# Patient Record
Sex: MALE | Race: WHITE | NOT HISPANIC OR LATINO
[De-identification: names, ages, dates, MRNs, and addresses within clinical notes are randomized per-mention and may not be internally consistent; named-entity substitution may affect disease eponyms.]

---

## 2023-06-23 PROBLEM — Z00.00 ENCOUNTER FOR PREVENTIVE HEALTH EXAMINATION: Status: ACTIVE | Noted: 2023-06-23

## 2023-07-06 ENCOUNTER — FORM ENCOUNTER (OUTPATIENT)
Age: 63
End: 2023-07-06

## 2023-07-06 ENCOUNTER — APPOINTMENT (OUTPATIENT)
Dept: ORTHOPEDIC SURGERY | Facility: CLINIC | Age: 63
End: 2023-07-06
Payer: COMMERCIAL

## 2023-07-06 VITALS — WEIGHT: 250 LBS | HEIGHT: 67 IN | BODY MASS INDEX: 39.24 KG/M2

## 2023-07-06 DIAGNOSIS — I10 ESSENTIAL (PRIMARY) HYPERTENSION: ICD-10-CM

## 2023-07-06 PROCEDURE — 99205 OFFICE O/P NEW HI 60 MIN: CPT

## 2023-07-06 PROCEDURE — 73503 X-RAY EXAM HIP UNI 4/> VIEWS: CPT | Mod: LT

## 2023-07-06 PROCEDURE — 73564 X-RAY EXAM KNEE 4 OR MORE: CPT | Mod: 50

## 2023-07-06 RX ORDER — HYALURONATE SODIUM 30 MG/2 ML
30 SYRINGE (ML) INTRAARTICULAR
Qty: 8 | Refills: 0 | Status: ACTIVE | COMMUNITY
Start: 2023-07-06

## 2023-07-06 NOTE — ASSESSMENT
[FreeTextEntry1] : 62 year M with left hip severe OA - tonnis grade 3\par \par I,Otoniel Lopez M.D. discussed the patient’s diagnosis and treatment options, non-surgical and surgical, with the patient and/or legal guardian including the potential benefits and complications of each. Potential complications of non-surgical treatments discussed include residual pain and/or disability, non-healing, progression of symptoms and/or disease. Potential complications of surgery discussed include infection, nerve injury, vascular injury, cartilage injury, ligament injury, tendon injury, muscle injury, skin injury, stiffness, instability, weakness, persistent pain, technical or hardware failure, need for additional surgery, re-injury, medical complication, anesthetic related complication, and/or death. All questions were answered. The patient and/or legal guardian has elected to proceed with surgery. Informed consent obtained for SADIA L YUNIER\par \par                   \par Preoperative evaluation and testing as needed is advised within 30 days prior to the procedure.\par  Also with b/l knee OA will do orthovisc b/l knees. Follow up once auth approved

## 2023-07-06 NOTE — HISTORY OF PRESENT ILLNESS
[9] : 9 [3] : 3 [Dull/Aching] : dull/aching [Localized] : localized [Sharp] : sharp [Leisure] : leisure [Sleep] : sleep [Standing] : standing [Walking] : walking [Stairs] : stairs [de-identified] : 07/06/2023 Mr. DIONNE BARKER is a 62 year male that comes in today with a chief complaint of left hip pain and bilateral knee pain. Has not had any injections to knees or hips. Reports groin pain. Does not take anything for pain. \par Works as \par \par PMH: HTN, HLD, hypothyroidism [] : Post Surgical Visit: no [FreeTextEntry1] : Left Hip [FreeTextEntry5] : Patient DIONNE BARKER is 62 years old and is being evaluated for the LEFT HIP. Patient had an incident with the right knee and because of the compensation the left hip started having pain. [de-identified] : 06/06/23 [de-identified] : PCP [de-identified] : none.

## 2023-07-18 ENCOUNTER — NON-APPOINTMENT (OUTPATIENT)
Age: 63
End: 2023-07-18

## 2023-07-25 ENCOUNTER — OUTPATIENT (OUTPATIENT)
Dept: OUTPATIENT SERVICES | Facility: HOSPITAL | Age: 63
LOS: 1 days | Discharge: HOME | End: 2023-07-25
Payer: COMMERCIAL

## 2023-07-25 VITALS
SYSTOLIC BLOOD PRESSURE: 128 MMHG | HEIGHT: 67 IN | HEART RATE: 90 BPM | TEMPERATURE: 98 F | RESPIRATION RATE: 17 BRPM | WEIGHT: 275.8 LBS | DIASTOLIC BLOOD PRESSURE: 76 MMHG | OXYGEN SATURATION: 97 %

## 2023-07-25 DIAGNOSIS — Z01.818 ENCOUNTER FOR OTHER PREPROCEDURAL EXAMINATION: ICD-10-CM

## 2023-07-25 DIAGNOSIS — M16.12 UNILATERAL PRIMARY OSTEOARTHRITIS, LEFT HIP: ICD-10-CM

## 2023-07-25 DIAGNOSIS — Z98.890 OTHER SPECIFIED POSTPROCEDURAL STATES: Chronic | ICD-10-CM

## 2023-07-25 DIAGNOSIS — E78.5 HYPERLIPIDEMIA, UNSPECIFIED: ICD-10-CM

## 2023-07-25 DIAGNOSIS — E03.9 HYPOTHYROIDISM, UNSPECIFIED: ICD-10-CM

## 2023-07-25 DIAGNOSIS — G47.30 SLEEP APNEA, UNSPECIFIED: ICD-10-CM

## 2023-07-25 DIAGNOSIS — Z01.812 ENCOUNTER FOR PREPROCEDURAL LABORATORY EXAMINATION: ICD-10-CM

## 2023-07-25 DIAGNOSIS — I10 ESSENTIAL (PRIMARY) HYPERTENSION: ICD-10-CM

## 2023-07-25 LAB
A1C WITH ESTIMATED AVERAGE GLUCOSE RESULT: 7.6 % — HIGH (ref 4–5.6)
ALBUMIN SERPL ELPH-MCNC: 3.7 G/DL — SIGNIFICANT CHANGE UP (ref 3.3–5)
ALP SERPL-CCNC: 73 U/L — SIGNIFICANT CHANGE UP (ref 40–120)
ALT FLD-CCNC: 54 U/L — SIGNIFICANT CHANGE UP (ref 12–78)
ANION GAP SERPL CALC-SCNC: 6 MMOL/L — SIGNIFICANT CHANGE UP (ref 5–17)
APTT BLD: 34.2 SEC — SIGNIFICANT CHANGE UP (ref 24.5–35.6)
AST SERPL-CCNC: 19 U/L — SIGNIFICANT CHANGE UP (ref 15–37)
BASOPHILS # BLD AUTO: 0.05 K/UL — SIGNIFICANT CHANGE UP (ref 0–0.2)
BASOPHILS NFR BLD AUTO: 0.4 % — SIGNIFICANT CHANGE UP (ref 0–2)
BILIRUB SERPL-MCNC: 0.5 MG/DL — SIGNIFICANT CHANGE UP (ref 0.2–1.2)
BUN SERPL-MCNC: 17 MG/DL — SIGNIFICANT CHANGE UP (ref 7–23)
CALCIUM SERPL-MCNC: 9.2 MG/DL — SIGNIFICANT CHANGE UP (ref 8.5–10.1)
CHLORIDE SERPL-SCNC: 108 MMOL/L — SIGNIFICANT CHANGE UP (ref 96–108)
CO2 SERPL-SCNC: 25 MMOL/L — SIGNIFICANT CHANGE UP (ref 22–31)
CREAT SERPL-MCNC: 0.94 MG/DL — SIGNIFICANT CHANGE UP (ref 0.5–1.3)
EGFR: 91 ML/MIN/1.73M2 — SIGNIFICANT CHANGE UP
EOSINOPHIL # BLD AUTO: 0.39 K/UL — SIGNIFICANT CHANGE UP (ref 0–0.5)
EOSINOPHIL NFR BLD AUTO: 3.2 % — SIGNIFICANT CHANGE UP (ref 0–6)
ESTIMATED AVERAGE GLUCOSE: 171 MG/DL — HIGH (ref 68–114)
GLUCOSE SERPL-MCNC: 154 MG/DL — HIGH (ref 70–99)
HCT VFR BLD CALC: 51.5 % — HIGH (ref 39–50)
HGB BLD-MCNC: 16.7 G/DL — SIGNIFICANT CHANGE UP (ref 13–17)
IMM GRANULOCYTES NFR BLD AUTO: 0.4 % — SIGNIFICANT CHANGE UP (ref 0–0.9)
INR BLD: 0.92 RATIO — SIGNIFICANT CHANGE UP (ref 0.85–1.18)
LYMPHOCYTES # BLD AUTO: 19.2 % — SIGNIFICANT CHANGE UP (ref 13–44)
LYMPHOCYTES # BLD AUTO: 2.31 K/UL — SIGNIFICANT CHANGE UP (ref 1–3.3)
MCHC RBC-ENTMCNC: 28.3 PG — SIGNIFICANT CHANGE UP (ref 27–34)
MCHC RBC-ENTMCNC: 32.4 G/DL — SIGNIFICANT CHANGE UP (ref 32–36)
MCV RBC AUTO: 87.3 FL — SIGNIFICANT CHANGE UP (ref 80–100)
MONOCYTES # BLD AUTO: 0.9 K/UL — SIGNIFICANT CHANGE UP (ref 0–0.9)
MONOCYTES NFR BLD AUTO: 7.5 % — SIGNIFICANT CHANGE UP (ref 2–14)
MRSA PCR RESULT.: SIGNIFICANT CHANGE UP
NEUTROPHILS # BLD AUTO: 8.36 K/UL — HIGH (ref 1.8–7.4)
NEUTROPHILS NFR BLD AUTO: 69.3 % — SIGNIFICANT CHANGE UP (ref 43–77)
NRBC # BLD: 0 /100 WBCS — SIGNIFICANT CHANGE UP (ref 0–0)
PLATELET # BLD AUTO: 274 K/UL — SIGNIFICANT CHANGE UP (ref 150–400)
POTASSIUM SERPL-MCNC: 4 MMOL/L — SIGNIFICANT CHANGE UP (ref 3.5–5.3)
POTASSIUM SERPL-SCNC: 4 MMOL/L — SIGNIFICANT CHANGE UP (ref 3.5–5.3)
PROT SERPL-MCNC: 7.8 GM/DL — SIGNIFICANT CHANGE UP (ref 6–8.3)
PROTHROM AB SERPL-ACNC: 11.1 SEC — SIGNIFICANT CHANGE UP (ref 9.5–13)
RBC # BLD: 5.9 M/UL — HIGH (ref 4.2–5.8)
RBC # FLD: 14.2 % — SIGNIFICANT CHANGE UP (ref 10.3–14.5)
S AUREUS DNA NOSE QL NAA+PROBE: SIGNIFICANT CHANGE UP
SODIUM SERPL-SCNC: 139 MMOL/L — SIGNIFICANT CHANGE UP (ref 135–145)
VIT D25+D1,25 OH+D1,25 PNL SERPL-MCNC: 61.8 PG/ML — SIGNIFICANT CHANGE UP (ref 19.9–79.3)
WBC # BLD: 12.06 K/UL — HIGH (ref 3.8–10.5)
WBC # FLD AUTO: 12.06 K/UL — HIGH (ref 3.8–10.5)

## 2023-07-25 PROCEDURE — 93010 ELECTROCARDIOGRAM REPORT: CPT

## 2023-07-25 NOTE — H&P PST ADULT - ATTENDING COMMENTS
I discussed this dx with the family at length. In order to restore the patient's ability to ambulate with minimal pain, a Left robotic assisted total hip replacement was recommended. The risks of the procedure were discussed at length which included but not limited to infection, bleeding, and damage to neurovascular structures. Informed consent was obtained.

## 2023-07-25 NOTE — H&P PST ADULT - ENMT COMMENTS
poor dentition , missing uoper tooth and has  2 loose lower teeth- sent to see dentist poor dentition , missing upper tooth and has  2 loose lower teeth- sent to see dentist

## 2023-07-25 NOTE — OCCUPATIONAL THERAPY INITIAL EVALUATION ADULT - ORIENTATION, REHAB EVAL
[facility] OCCUPATIONAL THERAPY DISCHARGE SUMMARY- REHAB     Date: 2021  Patient Name: Gustabo Cannon        MRN: [id number]  Account: [de-identified]   : 1937  (80 y.o.)  Room: [place] [address]    Diagnosis:  impaired mobility and ADL's due to CHF exacerbation    Past Medical History:   Diagnosis Date    Cardiac defibrillator in place     CKD (chronic kidney disease) stage 2, GFR 60-89 ml/min     COPD (chronic obstructive pulmonary disease) ([facility])     Dr Floyd Hwang Coronary artery disease involving native heart     managed by Dr Martínez Meredith.  Diastolic dysfunction     managed by Dr Martínez Meredith.  Diverticulosis of colon (without mention of hemorrhage)     Generalized anxiety disorder     Generalized osteoarthritis 10/02/2020    Glaucoma, left eye     managed by Dr Opal Rich History of CHF (congestive heart failure) 2014    managed by Dr Martínez Meredith.  History of lung cancer 2017    squamous cell, left base, had wedge resection Dr Court Izquierdo History of prostate cancer     prostatectomy --remission    History of rib fracture     left 9th and 10th ribs.  Hx of CABG     Hyperlipidemia     Hypertension 2004    Hyperuricemia     Macular degeneration, left eye     managed by Dr Romayne Purser    Mild cognitive impairment     Neurogenic orthostatic hypotension ([address]     Nocturnal hypoxemia     PAF (paroxysmal atrial fibrillation) ([facility])     [facility], Dr Aldo Garcia [facility])     Dr Arleen Nelson Primary hypothyroidism 2015    Primary lung squamous cell carcinoma ([address]     Prostate cancer ([address] 1999    prostatectomy --remission    Status post colostomy ([address] 2014    Dr Etienne Michaels, perforated diverticulitis    Tubular adenoma of colon     Dr Bishop Osborne     Past Surgical History:   Procedure Laterality Date    CARDIAC DEFIBRILLATOR PLACEMENT      Alyson Givens Fortify Defibrillator NOT MRI Compatable 0663-45T    COLONOSCOPY  6/4/15    DR. SHELLEY     COLOSTOMY 8/13/14    Dr Marcial Marlow  2004    CABG X 4    HEMIARTHROPLASTY HIP Right 4/28/2019    HIP HEMIARTHROPLASTY performed by Soy Ling MD at [address], PARTIAL Left 3/13/2015    wedge resection of left lung lower lobe    OTHER SURGICAL HISTORY  8/13/14    Exploratory laparotomy with sigmoid colectomy of end sigmoid colosotomy       Precautions:   Restrictions/Precautions: Fall Risk  Implants present? : Pacemaker  Other position/activity restrictions: colostomy     Social/Functional History:  Social/Functional History  Lives With: Significant other  Type of Home: House  Home Layout: One level  Home Access: Stairs to enter without rails  Entrance Stairs - Number of Steps: 1  Bathroom Shower/Tub: Walk-in shower  Bathroom Equipment: Grab bars in shower, Shower chair  Home Equipment: Rolling walker, Cane, [address] Help From: Home health  ADL Assistance: Independent  Homemaking Responsibilities: No  Ambulation Assistance: Needs assistance (uses ww and states occasionally needed assistance)  Transfer Assistance: Independent  Active : No  Occupation: Retired  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  Additional Comments: Pt was recently in SNF and discharged home approximately 1 week prior to admission    Current Functional Status:  ADL  Feeding: Setup  Grooming: Modified independent  (While standing with unilateral UE support on sink)  UE Bathing: Unable to assess(comment) (Pt declined bathing)  LE Bathing: Unable to assess(comment) (Pt declined bathing)  UE Dressing: Modified independent  (While seated EOB)  LE Dressing: Modified independent  (While seated EOB- pt doff/don pants, socks, shoes)  Toileting: Setup (Pt emptied colostomy bag while seated EOB, did not have to use toilet to urinate)  Toilet Transfers  Toilet - Technique: Stand pivot  Equipment Used: Grab bars  Toilet Transfer: Modified independent    Tub Transfers Tub Transfers: Not tested  Shower Transfers  Shower - Transfer From: Wheelchair  Shower - Transfer Type: To and From  Shower - Transfer To: Shower seat with back  Shower - Technique: Stand pivot  Shower Transfers: Modified independence  Shower Transfers Comments: Pt simulated shower transfer. Orientation Status:  Orientation  Overall Orientation Status: Impaired  Orientation Level: Oriented to place, Oriented to person    Cognition Status:  Cognition  Overall Cognitive Status: WNL  Arousal/Alertness: Delayed responses to stimuli  Following Commands: Follows one step commands with increased time  Attention Span: Appears intact  Memory: Appears intact  Safety Judgement: Decreased awareness of need for assistance (1 VC to lock brakes on wheelchair before standing.)  Problem Solving: Assistance required to generate solutions, Assistance required to implement solutions  Insights: Decreased awareness of deficits  Initiation: Requires cues for some  Sequencing: Requires cues for some  Cognition Comment: Comp: Mod I, Exp: I, Social: I, Mem: SUP, Prob: SUP    Perception Status:  Perception  Overall Perceptual Status: WFL    Sensation Status:  Sensation  Overall Sensation Status: WFL    Vision and Hearing Status:  Vision  Vision: Impaired  Vision Exceptions: Wears glasses for reading  Hearing  Hearing: Exceptions to [facility]  Hearing Exceptions: Hard of hearing/hearing concerns     UE Function Status:    ROM:   LUE AROM (degrees)  LUE AROM : WFL  Left Hand AROM (degrees)  Left Hand AROM: WFL  RUE AROM (degrees)  RUE AROM : WFL  Right Hand AROM (degrees)  Right Hand AROM: WFL    Strength:  LUE Strength  Gross LUE Strength: WFL  L Hand General: 4/5  RUE Strength  Gross RUE Strength: WFL  R Hand General: 4/5    Coordination, Tone, Quality of Movement:    Tone RUE  RUE Tone: Normotonic  Tone LUE  LUE Tone: Normotonic  Coordination  Movements Are Fluid And Coordinated: No  Coordination and Movement description: Tremors, Decreased speed, Right UE, Left UE    D/C Recommendations:    Equipment Recommendations:   None at this time    OT Follow Up:  OT D/C RECOMMENDATIONS  REQUIRES OT FOLLOW UP: Yes  Type: Home OT    Home Exercise Program Provided: [] Yes [x] No  Patient was discharged as planned discharge to a medical floor for cardiac intervention      Electronically signed by:    YULISSA Keene/L,  OTR/L  5/17/2021, 4:22 PM verbalized name and .

## 2023-07-25 NOTE — OCCUPATIONAL THERAPY INITIAL EVALUATION ADULT - PERTINENT HX OF CURRENT PROBLEM, REHAB EVAL
L hip OA which impacts pts ability to perform functional tasks/transfers and mobility. Pt is scheduled for L THR on 8/14/23.

## 2023-07-25 NOTE — H&P PST ADULT - ASSESSMENT
left hip osteoarthritis  CAPRINI SCORE    AGE RELATED RISK FACTORS                                                       MOBILITY RELATED FACTORS  [ ] Age 41-60 years                                            (1 Point)                  [ ] Bed rest                                                        (1 Point)  [ x] Age: 61-74 years                                           (2 Points)                [ ] Plaster cast                                                   (2 Points)  [ ] Age= 75 years                                              (3 Points)                 [ ] Bed bound for more than 72 hours                   (2 Points)    DISEASE RELATED RISK FACTORS                                               GENDER SPECIFIC FACTORS  [ ] Edema in the lower extremities                       (1 Point)                  [ ] Pregnancy                                                     (1 Point)  [ ] Varicose veins                                               (1 Point)                  [ ] Post-partum < 6 weeks                                   (1 Point)             [x ] BMI > 25 Kg/m2                                            (1 Point)                  [ ] Hormonal therapy  or oral contraception            (1 Point)                 [ ] Sepsis (in the previous month)                        (1 Point)                  [ ] History of pregnancy complications  [ ] Pneumonia or serious lung disease                                               [ ] Unexplained or recurrent                       (1 Point)           (in the previous month)                               (1 Point)  [ ] Abnormal pulmonary function test                     (1 Point)                 SURGERY RELATED RISK FACTORS  [ ] Acute myocardial infarction                              (1 Point)                 [ ]  Section                                            (1 Point)  [ ] Congestive heart failure (in the previous month)  (1 Point)                 [ ] Minor surgery                                                 (1 Point)   [ ] Inflammatory bowel disease                             (1 Point)                 [ ] Arthroscopic surgery                                        (2 Points)  [ ] Central venous access                                    (2 Points)                [ ] General surgery lasting more than 45 minutes   (2 Points)       [ ] Stroke (in the previous month)                          (5 Points)               [x ] Elective arthroplasty                                        (5 Points)                                                                                                                                               HEMATOLOGY RELATED FACTORS                                                 TRAUMA RELATED RISK FACTORS  [ ] Prior episodes of VTE                                     (3 Points)                 [ ] Fracture of the hip, pelvis, or leg                       (5 Points)  [ ] Positive family history for VTE                         (3 Points)                 [ ] Acute spinal cord injury (in the previous month)  (5 Points)  [ ] Prothrombin 04169 A                                      (3 Points)                 [ ] Paralysis  (less than 1 month)                          (5 Points)  [ ] Factor V Leiden                                             (3 Points)                 [ ] Multiple Trauma within 1 month                         (5 Points)  [ ] Lupus anticoagulants                                     (3 Points)                                                           [ ] Anticardiolipin antibodies                                (3 Points)                                                       [ ] High homocysteine in the blood                      (3 Points)                                             [ ] Other congenital or acquired thrombophilia       (3 Points)                                                [ ] Heparin induced thrombocytopenia                  (3 Points)                                          Total Score [     8     ]   Caprini Score 0-2: Low risk, No VTE Prophylaxis required for most patient's, encourage ambulation  Caprini Score 3-6: At Risk, Pharmacologic VTE prophylaxis is indicated for most patients ( in the absence of a contraindication)  Caprini Score Greater than or = 7: High Risk , pharmacologic VTE is indicated for most patients ( in the absence of a contraindication)    Caprini score indicates that the patient is high risk for VTE event ( score 6 or greater). Surgical patient's in this group will benefit from both pharmacologic prophylaxis and intermittent compression devices . Surgical team will determine the balance between VTE  risk and bleeding risk and other clinical considerations

## 2023-07-25 NOTE — OCCUPATIONAL THERAPY INITIAL EVALUATION ADULT - ADDITIONAL COMMENTS
Pt lives with spouse (Who can assist post op) in a private house with 9 steps to enter with bilateral handrails (Far apart). Once inside, the pt main bedroom and bathroom is on that floor when entering. The pts bathroom has a tub/shower combination, fixed shower head, standard toilet seat and 1x grab bar. The pt reports that a 3/1 commode can fit over the toilet at home. The pt ambulates with no device and does not own any device for ambulation. The pt daily pain is a 7/10 at rest and a 10/10 with movement. The pt manages the pain with rest and does not take any pain medication. The pt has no recent outpatient PT, no recent falls and has buckling of the leg. The pt wears glasses all the time, R handed, drives and has no hearing impairments.

## 2023-07-25 NOTE — H&P PST ADULT - HISTORY OF PRESENT ILLNESS
62 yo male , pmh- hypothyroid , htn,hld, obese sleep apnea syndrome c/o left hip pain 2/2 osteoarthritis - scheduled for left  hip arthroplasty  goal: to walk without pain   denies recent travels in the past 30 days. No fever, SOB, cough, flu like symptoms or body rash- covid screen

## 2023-07-25 NOTE — H&P PST ADULT - NSANTHOSAYNRD_GEN_A_CORE
denies/No. ALMA DELIA screening performed.  STOP BANG Legend: 0-2 = LOW Risk; 3-4 = INTERMEDIATE Risk; 5-8 = HIGH Risk

## 2023-08-04 ENCOUNTER — NON-APPOINTMENT (OUTPATIENT)
Age: 63
End: 2023-08-04

## 2023-08-09 PROBLEM — E03.9 HYPOTHYROIDISM, UNSPECIFIED: Chronic | Status: ACTIVE | Noted: 2023-07-25

## 2023-08-09 PROBLEM — E66.9 OBESITY, UNSPECIFIED: Chronic | Status: ACTIVE | Noted: 2023-07-25

## 2023-08-09 PROBLEM — I10 ESSENTIAL (PRIMARY) HYPERTENSION: Chronic | Status: ACTIVE | Noted: 2023-07-25

## 2023-08-09 PROBLEM — E78.5 HYPERLIPIDEMIA, UNSPECIFIED: Chronic | Status: ACTIVE | Noted: 2023-07-25

## 2023-08-10 ENCOUNTER — APPOINTMENT (OUTPATIENT)
Dept: CT IMAGING | Facility: CLINIC | Age: 63
End: 2023-08-10
Payer: COMMERCIAL

## 2023-08-10 PROCEDURE — 73700 CT LOWER EXTREMITY W/O DYE: CPT | Mod: LT

## 2023-08-10 PROCEDURE — 76376 3D RENDER W/INTRP POSTPROCES: CPT | Mod: LT

## 2023-08-14 ENCOUNTER — OUTPATIENT (OUTPATIENT)
Dept: OUTPATIENT SERVICES | Facility: HOSPITAL | Age: 63
LOS: 1 days | Discharge: HOME HEALTH | End: 2023-08-14

## 2023-08-14 ENCOUNTER — APPOINTMENT (OUTPATIENT)
Dept: ORTHOPEDIC SURGERY | Facility: HOSPITAL | Age: 63
End: 2023-08-14
Payer: COMMERCIAL

## 2023-08-14 ENCOUNTER — TRANSCRIPTION ENCOUNTER (OUTPATIENT)
Age: 63
End: 2023-08-14

## 2023-08-14 DIAGNOSIS — Z98.890 OTHER SPECIFIED POSTPROCEDURAL STATES: Chronic | ICD-10-CM

## 2023-08-14 PROCEDURE — 20985 CPTR-ASST DIR MS PX: CPT

## 2023-08-14 PROCEDURE — 27130 TOTAL HIP ARTHROPLASTY: CPT | Mod: 22,LT

## 2023-08-14 PROCEDURE — 27130 TOTAL HIP ARTHROPLASTY: CPT | Mod: AS,LT

## 2023-08-14 RX ORDER — SIMVASTATIN 20 MG/1
1 TABLET, FILM COATED ORAL
Refills: 0 | DISCHARGE

## 2023-08-14 RX ORDER — LEVOTHYROXINE SODIUM 125 MCG
1 TABLET ORAL
Refills: 0 | DISCHARGE

## 2023-08-15 ENCOUNTER — TRANSCRIPTION ENCOUNTER (OUTPATIENT)
Age: 63
End: 2023-08-15

## 2023-08-15 RX ORDER — AMLODIPINE AND ATORVASTATIN 5; 20 MG/1; MG/1
1 TABLET, FILM COATED ORAL
Refills: 0 | DISCHARGE

## 2023-08-17 ENCOUNTER — APPOINTMENT (OUTPATIENT)
Dept: ORTHOPEDIC SURGERY | Facility: CLINIC | Age: 63
End: 2023-08-17

## 2023-08-18 DIAGNOSIS — E03.9 HYPOTHYROIDISM, UNSPECIFIED: ICD-10-CM

## 2023-08-18 DIAGNOSIS — I10 ESSENTIAL (PRIMARY) HYPERTENSION: ICD-10-CM

## 2023-08-18 DIAGNOSIS — E78.5 HYPERLIPIDEMIA, UNSPECIFIED: ICD-10-CM

## 2023-08-18 DIAGNOSIS — M16.12 UNILATERAL PRIMARY OSTEOARTHRITIS, LEFT HIP: ICD-10-CM

## 2023-08-18 DIAGNOSIS — Z79.84 LONG TERM (CURRENT) USE OF ORAL HYPOGLYCEMIC DRUGS: ICD-10-CM

## 2023-08-18 DIAGNOSIS — E11.9 TYPE 2 DIABETES MELLITUS WITHOUT COMPLICATIONS: ICD-10-CM

## 2023-08-18 DIAGNOSIS — G47.33 OBSTRUCTIVE SLEEP APNEA (ADULT) (PEDIATRIC): ICD-10-CM

## 2023-08-29 ENCOUNTER — APPOINTMENT (OUTPATIENT)
Dept: ORTHOPEDIC SURGERY | Facility: CLINIC | Age: 63
End: 2023-08-29
Payer: COMMERCIAL

## 2023-08-29 VITALS — HEIGHT: 67 IN | BODY MASS INDEX: 39.24 KG/M2 | WEIGHT: 250 LBS

## 2023-08-29 DIAGNOSIS — M16.12 UNILATERAL PRIMARY OSTEOARTHRITIS, LEFT HIP: ICD-10-CM

## 2023-08-29 PROCEDURE — 73503 X-RAY EXAM HIP UNI 4/> VIEWS: CPT | Mod: LT

## 2023-08-29 PROCEDURE — 99024 POSTOP FOLLOW-UP VISIT: CPT

## 2023-08-29 NOTE — IMAGING
[de-identified] : LEFT HIP\par  Range of Motion\par  Hip: Flexion/extension/ER/IR     / EX 20/ ER 10/ IR 5 / AB 60 /AD 20\par  Impingement with flexion adduction and internal rotation: POS\par  Contracture: none\par  Snapping hip: negative\par  Greater trochanter: no tenderness\par  \par  Neurovascular\par  Distal extremities: warm to touch\par  Sensation to light touch: intact\par  Muscle strength: 5/5\par   \par  \par  Knee Exam\par  Alignment: Neutral \par  Effusion: None\par  Atrophy: None                                                \par  Stable to Varus/valgus stress\par  Posterior Drawer Test: negative\par  Anterior Drawer Test: Negative\par  Knee Extension/Flexion: 0 / 120\par  \par  Medial/lateral compartments\par  Medial joint line: No Tenderness\par  Lateral joint line: No Tenderness\par  Flo test: negative\par  \par  Patellofemoral joint\par  Medial patellar facet: POS tenderness\par  Patellar grind:POS\par  \par  Tendons:\par  Pes Anserine: No tenderness\par  Gerdy's Tubercle/ IT Band: No tenderness\par  Quadriceps Tendon: No Tenderness\par  patellar tendon: no Tenderness\par  Tibial tubercle: not tenderness\par  Calf: no Tenderness\par  \par  Neurovascular exam\par  Muscle strength: 5/5\par  Sensation to light touch: intact\par  Distal pulses: 2+\par  \par  IMAGING:\par  07/06/2023 Xrays of the Left hip 3v were taken demonstrating tonnis grade 3\par  XR of bilateral knees showing mild medial joint space narrowing with severe PF OA

## 2023-08-29 NOTE — ASSESSMENT
[FreeTextEntry1] : 62 year M with left hip severe OA - tonnis grade 3  I,Otoniel Lopez M.D. discussed the patient's diagnosis and treatment options, non-surgical and surgical, with the patient and/or legal guardian including the potential benefits and complications of each. Potential complications of non-surgical treatments discussed include residual pain and/or disability, non-healing, progression of symptoms and/or disease. Potential complications of surgery discussed include infection, nerve injury, vascular injury, cartilage injury, ligament injury, tendon injury, muscle injury, skin injury, stiffness, instability, weakness, persistent pain, technical or hardware failure, need for additional surgery, re-injury, medical complication, anesthetic related complication, and/or death. All questions were answered. The patient and/or legal guardian has elected to proceed with surgery. Informed consent obtained for SADIA L YUNIER                     Preoperative evaluation and testing as needed is advised within 30 days prior to the procedure.  Also with b/l knee OA will do orthovisc b/l knees. Follow up once auth approved  8/29/23: 1st post op YUNIER, doing well. Transition to OP PT, continue ASA X 2 weeks Follow up at 6 week ap, will clear to drive after repeat visit

## 2023-08-29 NOTE — HISTORY OF PRESENT ILLNESS
[Dull/Aching] : dull/aching [Sleep] : sleep [Standing] : standing [Walking] : walking [Stairs] : stairs [3] : 3 [9] : 9 [de-identified] : 07/06/2023 Mr. DIONNE BARKER is a 62 year male that comes in today with a chief complaint of left hip pain and bilateral knee pain. Has not had any injections to knees or hips. Reports groin pain. Does not take anything for pain.  Works as   PMH: HTN, HLD, hypothyroidism  08/29/2023 : 1st post op s/p YUNIER. Doing well, ambulating with a cane. Not taking anything for pain.  [] : Post Surgical Visit: no [FreeTextEntry1] : Left Hip [de-identified] : 06/06/23 [de-identified] : PCP [de-identified] : none.

## 2023-09-06 ENCOUNTER — APPOINTMENT (OUTPATIENT)
Dept: ORTHOPEDIC SURGERY | Facility: CLINIC | Age: 63
End: 2023-09-06
Payer: COMMERCIAL

## 2023-09-06 PROCEDURE — 73503 X-RAY EXAM HIP UNI 4/> VIEWS: CPT | Mod: LT

## 2023-09-06 PROCEDURE — 99024 POSTOP FOLLOW-UP VISIT: CPT

## 2023-09-06 RX ORDER — OXYCODONE 5 MG/1
5 TABLET ORAL EVERY 8 HOURS
Qty: 21 | Refills: 0 | Status: ACTIVE | COMMUNITY
Start: 2023-09-06 | End: 1900-01-01

## 2023-09-08 NOTE — HISTORY OF PRESENT ILLNESS
[3] : 3 [9] : 9 [Dull/Aching] : dull/aching [Sleep] : sleep [Standing] : standing [Walking] : walking [Stairs] : stairs [de-identified] : 07/06/2023 Mr. DIONNE BARKER is a 62 year male that comes in today with a chief complaint of left hip pain and bilateral knee pain. Has not had any injections to knees or hips. Reports groin pain. Does not take anything for pain.  Works as   PMH: HTN, HLD, hypothyroidism  08/29/2023 : 1st post op s/p YUNIER. Doing well, ambulating with a cane. Not taking anything for pain.   9/6/23: 3 weeks s/p YUNIER. Doing well, ambulating with a cane. Reports incidence of severe pain, felt the hip. Went to sleep and woke up with relief. States pain has been improving but still having pain.  [] : Post Surgical Visit: no [FreeTextEntry1] : Left Hip [de-identified] : 06/06/23 [de-identified] : PCP [de-identified] : none.

## 2023-09-08 NOTE — PHYSICAL EXAM
[de-identified] : POSTOP Left HIP EXAM Edema: mild well healing surgical incision without surrounding erythema/drainage   ROM 0-90 degrees of flexion No pain with IR/ER ROM   Neurovascular exam Motor function 5/5 distal lower extremity Sensation to light touch: intact Distal pulses: 2+  XR of the L hip today demonstrate YUNIER in place w/o signs of interval changes from postoperative XR

## 2023-09-08 NOTE — ASSESSMENT
[FreeTextEntry1] : 62 year M with left hip severe OA - tonnis grade 3 s/p L YUNIER  Repeat XRs compared to previous interval XRs and reviewed with Dr. Lopez with no significant changes Patient to continue dislocation precautions and physical therapy  Follow up in 2-3 weeks

## 2023-09-08 NOTE — IMAGING
[de-identified] : LEFT HIP\par  Range of Motion\par  Hip: Flexion/extension/ER/IR     / EX 20/ ER 10/ IR 5 / AB 60 /AD 20\par  Impingement with flexion adduction and internal rotation: POS\par  Contracture: none\par  Snapping hip: negative\par  Greater trochanter: no tenderness\par  \par  Neurovascular\par  Distal extremities: warm to touch\par  Sensation to light touch: intact\par  Muscle strength: 5/5\par   \par  \par  Knee Exam\par  Alignment: Neutral \par  Effusion: None\par  Atrophy: None                                                \par  Stable to Varus/valgus stress\par  Posterior Drawer Test: negative\par  Anterior Drawer Test: Negative\par  Knee Extension/Flexion: 0 / 120\par  \par  Medial/lateral compartments\par  Medial joint line: No Tenderness\par  Lateral joint line: No Tenderness\par  Flo test: negative\par  \par  Patellofemoral joint\par  Medial patellar facet: POS tenderness\par  Patellar grind:POS\par  \par  Tendons:\par  Pes Anserine: No tenderness\par  Gerdy's Tubercle/ IT Band: No tenderness\par  Quadriceps Tendon: No Tenderness\par  patellar tendon: no Tenderness\par  Tibial tubercle: not tenderness\par  Calf: no Tenderness\par  \par  Neurovascular exam\par  Muscle strength: 5/5\par  Sensation to light touch: intact\par  Distal pulses: 2+\par  \par  IMAGING:\par  07/06/2023 Xrays of the Left hip 3v were taken demonstrating tonnis grade 3\par  XR of bilateral knees showing mild medial joint space narrowing with severe PF OA

## 2023-09-26 ENCOUNTER — APPOINTMENT (OUTPATIENT)
Dept: ORTHOPEDIC SURGERY | Facility: CLINIC | Age: 63
End: 2023-09-26
Payer: COMMERCIAL

## 2023-09-26 VITALS — HEIGHT: 67 IN | WEIGHT: 250 LBS | BODY MASS INDEX: 39.24 KG/M2

## 2023-09-26 DIAGNOSIS — M17.0 BILATERAL PRIMARY OSTEOARTHRITIS OF KNEE: ICD-10-CM

## 2023-09-26 DIAGNOSIS — Z96.642 PRESENCE OF LEFT ARTIFICIAL HIP JOINT: ICD-10-CM

## 2023-09-26 PROCEDURE — 99213 OFFICE O/P EST LOW 20 MIN: CPT | Mod: 24

## 2023-09-26 RX ORDER — HYALURONATE SODIUM 20 MG/2 ML
20 SYRINGE (ML) INTRAARTICULAR
Qty: 1 | Refills: 0 | Status: ACTIVE | COMMUNITY
Start: 2023-09-26

## 2023-11-07 ENCOUNTER — APPOINTMENT (OUTPATIENT)
Dept: ORTHOPEDIC SURGERY | Facility: CLINIC | Age: 63
End: 2023-11-07

## 2023-12-10 ENCOUNTER — NON-APPOINTMENT (OUTPATIENT)
Age: 63
End: 2023-12-10

## 2024-02-29 ENCOUNTER — NON-APPOINTMENT (OUTPATIENT)
Age: 64
End: 2024-02-29

## 2024-08-23 ENCOUNTER — INPATIENT (INPATIENT)
Facility: HOSPITAL | Age: 64
LOS: 9 days | Discharge: HOME HEALTH | DRG: 392 | End: 2024-09-02
Attending: INTERNAL MEDICINE | Admitting: INTERNAL MEDICINE
Payer: COMMERCIAL

## 2024-08-23 VITALS
SYSTOLIC BLOOD PRESSURE: 106 MMHG | TEMPERATURE: 98 F | HEIGHT: 67 IN | OXYGEN SATURATION: 95 % | DIASTOLIC BLOOD PRESSURE: 80 MMHG | RESPIRATION RATE: 20 BRPM | WEIGHT: 250 LBS | HEART RATE: 98 BPM

## 2024-08-23 DIAGNOSIS — Z98.890 OTHER SPECIFIED POSTPROCEDURAL STATES: Chronic | ICD-10-CM

## 2024-08-23 DIAGNOSIS — R14.0 ABDOMINAL DISTENSION (GASEOUS): ICD-10-CM

## 2024-08-23 LAB
ALBUMIN SERPL ELPH-MCNC: 2.8 G/DL — LOW (ref 3.3–5)
ALP SERPL-CCNC: 146 U/L — HIGH (ref 40–120)
ALT FLD-CCNC: 10 U/L — SIGNIFICANT CHANGE UP (ref 10–45)
ANION GAP SERPL CALC-SCNC: 11 MMOL/L — SIGNIFICANT CHANGE UP (ref 5–17)
APTT BLD: 40.5 SEC — HIGH (ref 24.5–35.6)
AST SERPL-CCNC: 10 U/L — SIGNIFICANT CHANGE UP (ref 10–40)
BASOPHILS # BLD AUTO: 0.05 K/UL — SIGNIFICANT CHANGE UP (ref 0–0.2)
BASOPHILS NFR BLD AUTO: 0.6 % — SIGNIFICANT CHANGE UP (ref 0–2)
BILIRUB SERPL-MCNC: 0.5 MG/DL — SIGNIFICANT CHANGE UP (ref 0.2–1.2)
BUN SERPL-MCNC: 10 MG/DL — SIGNIFICANT CHANGE UP (ref 7–23)
CALCIUM SERPL-MCNC: 9 MG/DL — SIGNIFICANT CHANGE UP (ref 8.4–10.5)
CHLORIDE SERPL-SCNC: 101 MMOL/L — SIGNIFICANT CHANGE UP (ref 96–108)
CO2 SERPL-SCNC: 28 MMOL/L — SIGNIFICANT CHANGE UP (ref 22–31)
CREAT SERPL-MCNC: 0.71 MG/DL — SIGNIFICANT CHANGE UP (ref 0.5–1.3)
EGFR: 102 ML/MIN/1.73M2 — SIGNIFICANT CHANGE UP
EOSINOPHIL # BLD AUTO: 0.32 K/UL — SIGNIFICANT CHANGE UP (ref 0–0.5)
EOSINOPHIL NFR BLD AUTO: 3.6 % — SIGNIFICANT CHANGE UP (ref 0–6)
FLUAV AG NPH QL: SIGNIFICANT CHANGE UP
FLUBV AG NPH QL: SIGNIFICANT CHANGE UP
GLUCOSE SERPL-MCNC: 125 MG/DL — HIGH (ref 70–99)
HCT VFR BLD CALC: 35.2 % — LOW (ref 39–50)
HGB BLD-MCNC: 11 G/DL — LOW (ref 13–17)
HIV 1 & 2 AB SERPL IA.RAPID: SIGNIFICANT CHANGE UP
IMM GRANULOCYTES NFR BLD AUTO: 0.7 % — SIGNIFICANT CHANGE UP (ref 0–0.9)
INR BLD: 1.24 RATIO — HIGH (ref 0.85–1.18)
LYMPHOCYTES # BLD AUTO: 2.58 K/UL — SIGNIFICANT CHANGE UP (ref 1–3.3)
LYMPHOCYTES # BLD AUTO: 29 % — SIGNIFICANT CHANGE UP (ref 13–44)
MCHC RBC-ENTMCNC: 26.1 PG — LOW (ref 27–34)
MCHC RBC-ENTMCNC: 31.3 GM/DL — LOW (ref 32–36)
MCV RBC AUTO: 83.4 FL — SIGNIFICANT CHANGE UP (ref 80–100)
MONOCYTES # BLD AUTO: 1.11 K/UL — HIGH (ref 0–0.9)
MONOCYTES NFR BLD AUTO: 12.5 % — SIGNIFICANT CHANGE UP (ref 2–14)
NEUTROPHILS # BLD AUTO: 4.77 K/UL — SIGNIFICANT CHANGE UP (ref 1.8–7.4)
NEUTROPHILS NFR BLD AUTO: 53.6 % — SIGNIFICANT CHANGE UP (ref 43–77)
NRBC # BLD: 0 /100 WBCS — SIGNIFICANT CHANGE UP (ref 0–0)
PLATELET # BLD AUTO: 358 K/UL — SIGNIFICANT CHANGE UP (ref 150–400)
POTASSIUM SERPL-MCNC: 3.8 MMOL/L — SIGNIFICANT CHANGE UP (ref 3.5–5.3)
POTASSIUM SERPL-SCNC: 3.8 MMOL/L — SIGNIFICANT CHANGE UP (ref 3.5–5.3)
PROCALCITONIN SERPL-MCNC: 0.16 NG/ML — HIGH (ref 0.02–0.1)
PROT SERPL-MCNC: 7.4 G/DL — SIGNIFICANT CHANGE UP (ref 6–8.3)
PROTHROM AB SERPL-ACNC: 13.6 SEC — HIGH (ref 9.5–13)
RBC # BLD: 4.22 M/UL — SIGNIFICANT CHANGE UP (ref 4.2–5.8)
RBC # FLD: 15.5 % — HIGH (ref 10.3–14.5)
RSV RNA NPH QL NAA+NON-PROBE: SIGNIFICANT CHANGE UP
SARS-COV-2 RNA SPEC QL NAA+PROBE: SIGNIFICANT CHANGE UP
SODIUM SERPL-SCNC: 140 MMOL/L — SIGNIFICANT CHANGE UP (ref 135–145)
WBC # BLD: 8.89 K/UL — SIGNIFICANT CHANGE UP (ref 3.8–10.5)
WBC # FLD AUTO: 8.89 K/UL — SIGNIFICANT CHANGE UP (ref 3.8–10.5)

## 2024-08-23 PROCEDURE — 71275 CT ANGIOGRAPHY CHEST: CPT | Mod: 26

## 2024-08-23 PROCEDURE — 99285 EMERGENCY DEPT VISIT HI MDM: CPT

## 2024-08-23 PROCEDURE — 93970 EXTREMITY STUDY: CPT | Mod: 26

## 2024-08-23 PROCEDURE — 71045 X-RAY EXAM CHEST 1 VIEW: CPT | Mod: 26

## 2024-08-23 RX ORDER — DEXTROSE 15 G/33 G
15 GEL IN PACKET (GRAM) ORAL ONCE
Refills: 0 | Status: DISCONTINUED | OUTPATIENT
Start: 2024-08-23 | End: 2024-09-02

## 2024-08-23 RX ORDER — HYDROMORPHONE HYDROCHLORIDE 2 MG/1
2 TABLET ORAL EVERY 6 HOURS
Refills: 0 | Status: DISCONTINUED | OUTPATIENT
Start: 2024-08-23 | End: 2024-08-27

## 2024-08-23 RX ORDER — GLUCAGON INJECTION, SOLUTION 1 MG/.2ML
1 INJECTION, SOLUTION SUBCUTANEOUS ONCE
Refills: 0 | Status: DISCONTINUED | OUTPATIENT
Start: 2024-08-23 | End: 2024-09-02

## 2024-08-23 RX ORDER — LORAZEPAM 4 MG/ML
1 INJECTION INTRAMUSCULAR; INTRAVENOUS ONCE
Refills: 0 | Status: DISCONTINUED | OUTPATIENT
Start: 2024-08-23 | End: 2024-08-30

## 2024-08-23 RX ORDER — HYDROMORPHONE HYDROCHLORIDE 2 MG/1
0.5 TABLET ORAL EVERY 12 HOURS
Refills: 0 | Status: DISCONTINUED | OUTPATIENT
Start: 2024-08-23 | End: 2024-08-27

## 2024-08-23 RX ORDER — DEXTROSE 15 G/33 G
12.5 GEL IN PACKET (GRAM) ORAL ONCE
Refills: 0 | Status: DISCONTINUED | OUTPATIENT
Start: 2024-08-23 | End: 2024-09-02

## 2024-08-23 RX ORDER — SENNA 187 MG
2 TABLET ORAL AT BEDTIME
Refills: 0 | Status: DISCONTINUED | OUTPATIENT
Start: 2024-08-23 | End: 2024-09-02

## 2024-08-23 RX ORDER — FUROSEMIDE 40 MG
40 TABLET ORAL DAILY
Refills: 0 | Status: DISCONTINUED | OUTPATIENT
Start: 2024-08-23 | End: 2024-08-26

## 2024-08-23 RX ORDER — DEXTROSE 15 G/33 G
25 GEL IN PACKET (GRAM) ORAL ONCE
Refills: 0 | Status: DISCONTINUED | OUTPATIENT
Start: 2024-08-23 | End: 2024-09-02

## 2024-08-23 RX ORDER — HEPARIN SODIUM,BOVINE 1000/ML
5000 VIAL (ML) INJECTION EVERY 8 HOURS
Refills: 0 | Status: DISCONTINUED | OUTPATIENT
Start: 2024-08-23 | End: 2024-08-26

## 2024-08-23 RX ORDER — CEFEPIME 2 G/1
1000 INJECTION, POWDER, FOR SOLUTION INTRAVENOUS EVERY 8 HOURS
Refills: 0 | Status: DISCONTINUED | OUTPATIENT
Start: 2024-08-23 | End: 2024-08-26

## 2024-08-23 RX ADMIN — Medication 2 TABLET(S): at 23:23

## 2024-08-23 RX ADMIN — Medication 5000 UNIT(S): at 23:23

## 2024-08-23 RX ADMIN — Medication 10 MILLIGRAM(S): at 23:23

## 2024-08-23 RX ADMIN — CEFEPIME 100 MILLIGRAM(S): 2 INJECTION, POWDER, FOR SOLUTION INTRAVENOUS at 23:22

## 2024-08-23 NOTE — CONSULT NOTE ADULT - ASSESSMENT
64-year-old male with PMHx of hypertension, hypercholesterolemia, hypothyroidism presenting with 2 months of abdominal distention and pain, night sweats, and weight loss of 25 pounds.  Endorses SOB with trouble taking deep breaths, improving. Patient is a former smoker, quit 1 year ago. + constipation, + dysuria intermittent x 1 month.  Last bowel movement 2 days ago, wnl. + flatulance.  Patient evaluated at [place] [facility] 1 month ago, had CT scan and US, told he had benign liver cysts and discharged. When abd discomfort  did not improve pt followed up with his PCP who sent him for an MRI 2 days ago.  Patient called last night and told to go to ED for follow-up testing. Results were not discussed with him. Denies chest pain, fever, cough, recent travel.  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 Denies chronic alcohol use. Pt did at home H pylori test last week, came back positive.  pt with hx of htn, dm with hepatic mass with  increasing sob and LE efdema  no cardiac dawn done before  awaiting repeat ecg  echo  tsh  lipid panel  lasix  will adjust bp meds

## 2024-08-23 NOTE — ED PROVIDER NOTE - PROGRESS NOTE DETAILS
Discussed work up within ED which are wnl. Discussed PCP recommendation of further work up. Pt also does not want to leave without "testing his cysts." Will admit for further evaluation of cysts. Pt understands and is in agreement with this plan.

## 2024-08-23 NOTE — ED ADULT NURSE NOTE - OBJECTIVE STATEMENT
64 yr old male to ED accomp by spouse with c/o abd distension x 2 months Pt staes, " I am passing gas." BM are inconsistent Denies abd surgery Abd grossly distend with c/o pain. Seen at another hosp CT and sono done. Denies ETOH or ilicit drugs. Denies fever or chills. . 64 yr old male to ED accomp by spouse with c/o abd distension x 2 months Pt states, " I am passing gas." BM are inconsistent Denies abd surgery Abd grossly distend with c/o pain. Seen at another hosp CT and sono done. Denies ETOH or illicit drugs. Denies fever or chills. .

## 2024-08-23 NOTE — H&P ADULT - NSHPPHYSICALEXAM_GEN_ALL_CORE
T(C): 36.8 (08-23-24 @ 11:54), Max: 36.8 (08-23-24 @ 10:09)  HR: 94 (08-23-24 @ 11:54) (94 - 98)  BP: 119/90 (08-23-24 @ 11:54) (106/80 - 119/90)  RR: 18 (08-23-24 @ 11:54) (18 - 20)  SpO2: 95% (08-23-24 @ 11:54) (95% - 95%)  GENERAL: NAD, lying in bed comfortably  HEAD:  Atraumatic, normocephalic  EYES: EOMI, PERRLA, conjunctiva and sclera clear  NECK: Supple, trachea midline, no JVD  HEART: Regular rate and rhythm, no murmurs, rubs, or gallops  LUNGS: Unlabored respirations.  Clear to auscultation bilaterally, no crackles, wheezing, or rhonchi  ABDOMEN: Soft, nontender, nondistended, +BS  EXTREMITIES: 2+ peripheral pulses bilaterally. No clubbing, cyanosis, or edema  NERVOUS SYSTEM:  A&Ox3, moving all extremities, no focal deficits   SKIN: No rashes or lesions T(C): 36.8 (08-23-24 @ 11:54), Max: 36.8 (08-23-24 @ 10:09)  HR: 94 (08-23-24 @ 11:54) (94 - 98)  BP: 119/90 (08-23-24 @ 11:54) (106/80 - 119/90)  RR: 18 (08-23-24 @ 11:54) (18 - 20)  SpO2: 95% (08-23-24 @ 11:54) (95% - 95%)  GENERAL: NAD, lying in bed comfortably  HEAD:  Atraumatic, normocephalic  EYES: EOMI, PERRLA, conjunctiva and sclera clear  NECK: Supple, trachea midline, no JVD  HEART: Regular rate and rhythm, no murmurs, rubs, or gallops  LUNGS: Unlabored respirations.  Clear to auscultation bilaterally, no crackles, wheezing, or rhonchi  ABDOMEN: Soft, nontender, nondistended, +BS  EXTREMITIES: 2+ peripheral pulses bilaterally. No clubbing, cyanosis,    +  b/l  edema  NERVOUS SYSTEM:  A&Ox3, moving all extremities, no focal deficits   SKIN: No rashes or lesions

## 2024-08-23 NOTE — H&P ADULT - ASSESSMENT
64-year-old male       with PMHx of hypertension, hypercholesterolemia, hypothyroidism,  ex  smoker . quit a yr  ago     presenting with 2 months of abdominal distention and pain, night sweats, and weight loss of 25 pounds.       former smoker, quit 1 year ago..      was  evaluated at [place]   er  1 month ago, had CT scan and US, told he had benign liver cysts and discharged.     due  to  persistent  abd  pain,  his  pcp,   did  an  MRI 2 days ago.. pt  was    called last night and told to go to  er  for follow-up testing.     denies chest pain, fever, cough, recent travel.     abd  pain,  from  cysts      MRI ,    HTN/  HLD   Hypothyroid   dvt  ppx      rad                     64-year-old male       with PMHx of hypertension, hypercholesterolemia, hypothyroidism,  ex  smoker . quit  a yr  ago     presenting with 2 months of abdominal distention and pain, night sweats, and weight loss of 25 pounds.       former smoker, quit 1 year ago..      was  evaluated at [place]   er  1 month ago, had CT scan and US, told he had benign liver cysts and discharged.     due  to  persistent  abd  pain,  his  pcp,   did  an  MRI 2 days ago.. pt  was    called last night and told to go to  er  for follow-up testing.     denies chest pain, fever, cough, recent travel.     abd  pain,  from  cysts       MRI    8/21/24.   posterior stomach,  heterogenous cyst  9.2 x 6.4cm; upper pelvis heterogenous cyst, just right of midline 6.4 x 4.0 cm; infectious vs neoplastic.        house  gi    called/  need  to  r/o  malignant  process    HTN/  HLD   Hypothyroid   dvt  ppx /  ekg.  pending     Ct  chest  angio, pending                           64-year-old male       with PMHx of hypertension, hypercholesterolemia, hypothyroidism,  ex  smoker . quit  a yr  ago     presenting with 2 months of abdominal distention and pain, night sweats, and weight loss of 25 pounds.       former smoker, quit 1 year ago..      was  evaluated at [place]   er  1 month ago, had CT scan and US, told he had benign liver cysts and discharged.     due  to  persistent  abd  pain,  his  pcp,   did  an  MRI 2 days ago.. pt  was    called last night and told to go to  er  for follow-up testing.     denies chest pain, fever, cough, recent travel.     abd  pain,  from  cysts       MRI    8/21/24.   posterior stomach,  heterogenous cyst  9.2 x 6.4cm; upper pelvis heterogenous cyst, just right of midline 6.4 x 4.0 cm; infectious vs neoplastic.             l right and trace left pleural effusion. Few cystic/fluid filled structures within the liver measuring 4.4cm.  the lateral side of the liver, there is a 21 x 14 cm cystic collection. Abutting/adjacent to the stomach, there is a cystic fluid structure measuring 9x6 cm which appears to be heterogenous with debris. In the upper pelvis there is a cystic fluid structure which is heterogenous with debris 6x4cm.   Findings concerning for neoplasm, or inflammatory.       house  gi    called/  need  to  r/o  malignant  process    HTN/  HLD   Hypothyroid   dvt  ppx /  ekg.  pending     Ct  chest  angio, pending /  ct  a/p  triple  phase                          64-year-old male     h/o  HTN.  HLD,  hypothyroidism,  ex  smoker . quit  a yr  ago          presenting with 2 months of abdominal distention, pain, night sweats, and weight loss of 25 pounds.          was  evaluated at [place]   er  1 month ago, had CT scan and US, told he had benign liver cysts and discharged.        persistent  abd  pain,  his  pcp,   did  an  MRI  2 days ago.. pt  wastold to go to  er   /   denies chest pain, fever, cough, recent travel.       abd  pain,  from  cysts       MRI    8/21/24.   posterior stomach,  heterogenous cyst  9.2 x 6.4cm; upper pelvis heterogenous cyst, just right of midline 6.4 x 4.0 cm              right and trace left pleural effusion. Few cystic/fluid filled structures within the liver measuring 4.4cm.  the lateral side,   21 x 14 cm cystic collection. abutting/adjacent to the stomach,              cystic fluid structure measuring 9x6 cm ,   appears to be heterogenous with debris. In  upper pelvis  there is a cystic fluid structure heterogenous with debris 6x4cm concer  for neoplasm,    house     gi    called/  need  to  r/o  malignant  process/  ID  eval in am,  r/o infectious  process         pedal  edema/  sob   on exertion,  , for past  month or  so              ?  acute  diastolic     chf.  / iv lasix/    card  f/p        HTN/  HLD      Hypothyroid         dvt  ppx /  ekg.    nsr, low   voltage   /  echo           Ct  chest  angio, pending /   MRI  a/p, pending             wife at  bedside                        64-year-old male     h/o  HTN.  HLD,  hypothyroidism,  ex  smoker . quit  a yr  ago          presenting with 2 months of abdominal distention, pain, night sweats, and weight loss of 25 pounds.          was  evaluated at [place]   er  1 month ago, had CT scan and US, told he had benign liver cysts and discharged.        persistent  abd  pain,  his  pcp,   did  an  MRI  2 days ago.. pt  wastold to go to  er   /   denies chest pain, fever, cough, recent travel.       abd  pain,  from  cysts       MRI    8/21/24.   posterior stomach,  heterogenous cyst  9.2 x 6.4cm; upper pelvis heterogenous cyst, just right of midline 6.4 x 4.0 cm              right and trace left pleural effusion. Few cystic/fluid filled structures within the liver measuring 4.4cm.  the lateral side,   21 x 14 cm cystic collection. abutting/adjacent to the stomach,              cystic fluid structure measuring 9x6 cm ,   appears to be heterogenous with debris. In  upper pelvis  there is a cystic fluid structure heterogenous with debris 6x4cm concer  for neoplasm,             house    gi   eval,  need  to  r/o  malignant  process/  ID  eval    r/o infectious  process         pedal  edema/  sob   on exertion,  , for past  month or  so              ?  acute  diastolic     chf.  / iv lasix/    card  f/p        HTN/  HLD      Hypothyroid      DM,  follow  fs          dvt  ppx /  ekg.    nsr, low   voltage   /  echo            Ct  chest  angio,  no pe,  hepatic  cysts.  upto  2 2 cm         MRI  a/p, pending         on  empiric  iv zosyn            wife at  bedside       rad< from: CT Angio Chest PE Protocol w/ IV Cont (08.23.24 @ 16:11) >  MPRESSION:  No pulmonary embolism.  Right hemidiaphragm elevation with mild atelectasis involving the right   middle lobe and right base.  Trace right pleural effusion.  Partially imaged cystic hepatic lesions with largest measuring up to 22   cm. The exact etiology is unclear and diagnostic considerations include   infection and potentially metastasis. Correlate with outside abdominal   MRI from 2 days ago and consider CT abdomen and pelvis for further   assessment  --- End of Report ---      <

## 2024-08-23 NOTE — CONSULT NOTE ADULT - SUBJECTIVE AND OBJECTIVE BOX
HPI:    Mr. Ferrari is a 64 yrs old male w/ hx of HTN, HLD, and hypothyroidism who presented w/ abd pain for the past one month. Reported he had o/p MRI A/P completed on 8/21 which showed multiple cysts and he was told to come to the ED. Patient reported prior to July, he was feeling normal but last month, started to notice abd pain that was lower abd radiating to her RUQ region. Also had abd distension with bloating. Developed progressive constipation but has been tolerating PO diet, no nausea or vomiting. Denied fevers and chills. Also complained of 20 lbs weight loss in the last one month. Denied fam hx of pancreatic or liver or GB cancer. On admission, VSS, labs showed elevated ALP, but normal bilirubin and liver enzymes. Advanced GI consulted for multiple hepatic cysts and heterogenous cysts posterior to the stomach.     Allergies:  No Known Allergies      Hospital Medications:  HYDROmorphone   Tablet 2 milliGRAM(s) Oral every 6 hours PRN  HYDROmorphone  Injectable 0.5 milliGRAM(s) IV Push every 12 hours PRN  senna 2 Tablet(s) Oral at bedtime      PMHX/PSHX:  Obese    HTN (hypertension)    HLD (hyperlipidemia)    Hypothyroid    Status post tendon repair        Family history:  Family history of breast cancer in mother (Mother)    Family history of throat cancer (Father)    Social History:   Tob: Denies  EtOH: Denies  Illicit Drugs: Denies    ROS:     General:  No wt loss, fevers, chills, night sweats, fatigue  Eyes:  Good vision, no reported pain  ENT:  No sore throat, pain, runny nose, dysphagia  CV:  No pain, palpitations, hypo/hypertension  Pulm:  No dyspnea, cough, tachypnea, wheezing  GI:  see HPI  :  No pain, bleeding, incontinence, nocturia  Muscle:  No pain, weakness  Neuro:  No weakness, tingling, memory problems  Psych:  No fatigue, insomnia, mood problems, depression  Endocrine:  No polyuria, polydipsia, cold/heat intolerance  Heme:  No petechiae, ecchymosis, easy bruisability  Skin:  No rash, tattoos, scars, edema    PHYSICAL EXAM:     GENERAL:  No acute distress, sitting on the bed.   HEENT:  NCAT, no scleral icterus   CHEST:  no respiratory distress  HEART:  Regular rate and rhythm  ABDOMEN:  Soft, severely distended, mild lower abd tenderness, no rebound or guarding.   EXTREMITIES: No LE edema b/l  SKIN:  No rash/erythema/ecchymoses/petechiae/wounds/abscess/warm/dry  NEURO:  Alert and oriented x 3, no tremors.     Vital Signs:  Vital Signs Last 24 Hrs  T(C): 36.8 (23 Aug 2024 15:33), Max: 36.8 (23 Aug 2024 10:09)  T(F): 98.3 (23 Aug 2024 15:33), Max: 98.3 (23 Aug 2024 15:33)  HR: 98 (23 Aug 2024 15:33) (94 - 98)  BP: 135/74 (23 Aug 2024 15:33) (106/80 - 135/74)  BP(mean): --  RR: 18 (23 Aug 2024 15:33) (18 - 20)  SpO2: 94% (23 Aug 2024 15:33) (94% - 95%)    Parameters below as of 23 Aug 2024 15:33  Patient On (Oxygen Delivery Method): room air      Daily Height in cm: 170.18 (23 Aug 2024 10:09)    Daily     LABS:                        11.0   8.89  )-----------( 358      ( 23 Aug 2024 11:47 )             35.2     Mean Cell Volume: 83.4 fl (08-23-24 @ 11:47)    08-23    140  |  101  |  10  ----------------------------<  125<H>  3.8   |  28  |  0.71    Ca    9.0      23 Aug 2024 11:47    TPro  7.4  /  Alb  2.8<L>  /  TBili  0.5  /  DBili  x   /  AST  10  /  ALT  10  /  AlkPhos  146<H>  08-23    LIVER FUNCTIONS - ( 23 Aug 2024 11:47 )  Alb: 2.8 g/dL / Pro: 7.4 g/dL / ALK PHOS: 146 U/L / ALT: 10 U/L / AST: 10 U/L / GGT: x           PT/INR - ( 23 Aug 2024 11:47 )   PT: 13.6 sec;   INR: 1.24 ratio         PTT - ( 23 Aug 2024 11:47 )  PTT:40.5 sec  Urinalysis Basic - ( 23 Aug 2024 11:47 )    Color: x / Appearance: x / SG: x / pH: x  Gluc: 125 mg/dL / Ketone: x  / Bili: x / Urobili: x   Blood: x / Protein: x / Nitrite: x   Leuk Esterase: x / RBC: x / WBC x   Sq Epi: x / Non Sq Epi: x / Bacteria: x                              11.0   8.89  )-----------( 358      ( 23 Aug 2024 11:47 )             35.2       Imaging:

## 2024-08-23 NOTE — ED PROVIDER NOTE - ATTENDING CONTRIBUTION TO CARE
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  PCP ordered patient for outpatient MRI of abdomen and pelvis which showed numerous hepatic cyst complex nature 9 x 6 cm being the largest.  Patient's history complicated by abdominal wall benign tumors.  Positive family history of oropharyngeal cancer and breast cancer.  Denies history of parasitic infections in the past, HIV, tuberculosis.  Never incarcerated no IV drug use.  Denies fever or chills.  Still having bowel movements tolerating p.o.  Of note patient did have a positive H. pylori test outpatient.    Hemodynamically stable, mildly tachycardic otherwise NAD. AAOx4 (person, place, time, event). PERRL 3mm, EOMI w/o nystagmus, well hydrated, RRR, no audible cardiac murmurs. clear lungs, no inc work of breathing.  Distended abdomen without fluid wave, positive right upper quadrant palpable large mass with multiple benign smaller proximately 1 cm masses located throughout the abdominal wall positive tenderness palpation over each of these masses.  There is no overlying skin changes, fluctuance, crepitations., - horvath, no peritoneal signs, no cva ttp. no visible rashes nor deformities, no signs of jaundice, fluid overload, nor anemia. symm calves, no edema. full str/rom/neurovasc all 4 extrem. Steady gait.    MRI report comments on wide differential including neoplastic and infectious.  Recommend IR for biopsy.  Plan to rule out potential infectious causes in the interim prior to biopsy.  Plan for labs, CBC differential to eval elevated eosinophils, Echinococcus testing, Entamoeba testing, tuberculosis testing, HIV testing.  No current indication for repeat imaging as patient has the MRI disc and report with him.  Summary of presentation, physical exam findings, plan, expected turn around time for diagnostics discussed with patient. Agrees.

## 2024-08-23 NOTE — ED PROVIDER NOTE - NSICDXFAMILYHX_GEN_ALL_CORE_FT
FAMILY HISTORY:  Father  Still living? No  Family history of throat cancer, Age at diagnosis: Age Unknown    Mother  Still living? No  Family history of breast cancer in mother, Age at diagnosis: Age Unknown

## 2024-08-23 NOTE — ED PROVIDER NOTE - CLINICAL SUMMARY MEDICAL DECISION MAKING FREE TEXT BOX
64-year-old male with PMHx of hypertension, hypercholesterolemia, hypothyroidism presenting with 2 months of abdominal distention and pain, night sweats, and weight loss of 25 pounds. PE: + abd distension, abd pain R > L, palpable abd masses, 2+ pitting edema bl. MRI 8/21/24 posterior stomach heterogenous cyst 9.2 x 6.4cm; upper pelvis heterogenous cyst, just right of midline 6.4 x 4.0 cm; infectious vs neoplastic.   DDx includes but is not limited to malignancy, H pylori, coccidiosis   Work up:   Tx:  Plan: 64-year-old male with PMHx of hypertension, hypercholesterolemia, hypothyroidism presenting with 2 months of abdominal distention and pain, night sweats, and weight loss of 25 pounds. VSS. PE: + abd distension, abd pain R > L, palpable abd masses, 2+ pitting edema bl. Out patient MRI 8/21/24 posterior stomach heterogenous cyst 9.2 x 6.4cm; upper pelvis heterogenous cyst, just right of midline 6.4 x 4.0 cm; infectious vs neoplastic.   DDx includes but is not limited to malignancy, H pylori, coccidioidomycosis   Work up: CBC, CMP, fungal BW, viral swab, PT/PTT/INR, TB, HIV, procalcitonin, CXR  Tx: none at this time.  Plan: Admit patient for possible biopsy of cyst.

## 2024-08-23 NOTE — PATIENT PROFILE ADULT - FALL HARM RISK - UNIVERSAL INTERVENTIONS
Bed in lowest position, wheels locked, appropriate side rails in place/Call bell, personal items and telephone in reach/Instruct patient to call for assistance before getting out of bed or chair/Non-slip footwear when patient is out of bed/[place] to call system/Physically safe environment - no spills, clutter or unnecessary equipment/Purposeful Proactive Rounding/Room/bathroom lighting operational, light cord in reach

## 2024-08-23 NOTE — H&P ADULT - NSHPLABSRESULTS_GEN_ALL_CORE
LABS:                        11.0   8.89  )-----------( 358      ( 23 Aug 2024 11:47 )             35.2     08-23    140  |  101  |  10  ----------------------------<  125<H>  3.8   |  28  |  0.71    Ca    9.0      23 Aug 2024 11:47    TPro  7.4  /  Alb  2.8<L>  /  TBili  0.5  /  DBili  x   /  AST  10  /  ALT  10  /  AlkPhos  146<H>  08-23    PT/INR - ( 23 Aug 2024 11:47 )   PT: 13.6 sec;   INR: 1.24 ratio         PTT - ( 23 Aug 2024 11:47 )  PTT:40.5 sec      Urinalysis Basic - ( 23 Aug 2024 11:47 )    Color: x / Appearance: x / SG: x / pH: x  Gluc: 125 mg/dL / Ketone: x  / Bili: x / Urobili: x   Blood: x / Protein: x / Nitrite: x   Leuk Esterase: x / RBC: x / WBC x   Sq Epi: x / Non Sq Epi: x / Bacteria: x

## 2024-08-23 NOTE — ED PROVIDER NOTE - PHYSICAL EXAMINATION
Const: Awake, alert, no acute distress.  Well appearing.  Moving comfortably on stretcher.  HEENT: NC/AT.  Moist mucous membranes.   Eyes: Extraocular movements intact b/l. Conjunctiva clear. No scleral icterus.  Neck: Neck supple, ROM without pain.  Cardiac: Regular rate and regular rhythm. S1 S2 present.  + bl LE 2+ pitting edema.    Resp: Speaking in full sentences. No evidence of respiratory distress.  Good air entry b/l, breath sounds clear to auscultation b/l.  Abd: Significantly distended, no overlying skin changes.  Soft, + tenderness R > L, no guarding, no rigidity.  + palpable masses RUQ  Skin: Normal coloration.  No rashes, abrasions or lacerations.  Neuro: Awake, alert & oriented x 3.  Moves all extremities spontaneously.

## 2024-08-23 NOTE — CONSULT NOTE ADULT - ASSESSMENT
Impression:    64 yrs old male w/ hx of HTN, HLD, and hypothyroidism who presented w/ abd pain and new finding of multiple hepatic cysts along with heterogenous cyst posterior to the stomach. Advanced GI consulted for possible drainage.     #Weight loss and abd pain  Per report OSH, MRI abd/pelvis on 8/22 showed small right and trace left pleural effusion. Few cystic/fluid filled structures within the liver measuring 4.4cm.  Impression:    64 yrs old male w/ hx of HTN, HLD, and hypothyroidism who presented w/ abd pain and new finding of multiple hepatic cysts along with heterogenous cyst posterior to the stomach. Advanced GI consulted for possible drainage.     #Weight loss and abd pain  Per report OSH, MRI abd/pelvis on 8/22 showed small right and trace left pleural effusion. Few cystic/fluid filled structures within the liver measuring 4.4cm. On the lateral side of the liver, there is a 21 x 14 cm cystic collection. Abutting/adjacent to the stomach, there is a cystic fluid structure measuring 9x6 cm which appears to be heterogenous with debris. In the upper pelvis there is a cystic fluid structure which is heterogenous with debris 6x4cm. Findings concerning for neoplasm, or inflammatory.     Recommendations:   - Please upload the images from OSH.   - Please obtain repeat triple phase CT abd/pelvis.   - CMP and CBC daily.     Discussed the case with Dr. Becerra.       All recommendations are tentative until note is attested by an attending.     Giancarlo Caballero, PGY-6  Gastroenterology/Hepatology Fellow  Available on [employer] Teams  [phone] (Short Range Pager)  [phone] (Long Range Pager)    After 5pm, please contact the on-call GI fellow.

## 2024-08-23 NOTE — ED PROVIDER NOTE - OBJECTIVE STATEMENT
64-year-old male with PMHx of hypertension, hypercholesterolemia, hypothyroidism presenting with 2 months of abdominal distention and pain, night sweats, and weight loss of 25 pounds.  Endorses SOB with trouble taking deep breaths, improving. Patient is a former smoker, quit 1 year ago. + constipation, + dysuria intermittent x 1 month.  Last bowel movement 2 days ago, wnl. + flatulance.  Patient evaluated at [place] [facility] 1 month ago, had CT scan and US, told he had benign liver cysts and discharged. When abd discomfort  did not improve pt followed up with his PCP who sent him for an MRI 2 days ago.  Patient called last night and told to go to ED for follow-up testing. Results were not discussed with him. Denies chest pain, fever, cough, recent travel.  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 Denies chronic alcohol use. Pt did at home H pylori test last week, came back positive.

## 2024-08-23 NOTE — H&P ADULT - HISTORY OF PRESENT ILLNESS
64-year-old male       with PMHx of hypertension, hypercholesterolemia, hypothyroidism     presenting with 2 months of abdominal distention and pain, night sweats, and weight loss of 25 pounds.       former smoker, quit 1 year ago. ,  had   dysuria intermittent x 1 month.      was  evaluated at [place] [facility] 1 month ago, had CT scan and US, told he had benign liver cysts and discharged.     due  to  persistent  abd  pain,  his  pcp,   did  an  MRI 2 days ago.. pt  was    called last night and told to go to  er  for follow-up testing.     denies chest pain, fever, cough, recent travel.

## 2024-08-23 NOTE — CONSULT NOTE ADULT - SUBJECTIVE AND OBJECTIVE BOX
Date of Service, 08-23-24 @ 20:30  CHIEF COMPLAINT:Patient is a 64y old  Male who presents with a chief complaint of abd  pain (23 Aug 2024 17:14)      HPI:  64-year-old male with PMHx of hypertension, hypercholesterolemia, hypothyroidism presenting with 2 months of abdominal distention and pain, night sweats, and weight loss of 25 pounds.  Endorses SOB with trouble taking deep breaths, improving. Patient is a former smoker, quit 1 year ago. + constipation, + dysuria intermittent x 1 month.  Last bowel movement 2 days ago, wnl. + flatulance.  Patient evaluated at [place] [facility] 1 month ago, had CT scan and US, told he had benign liver cysts and discharged. When abd discomfort  did not improve pt followed up with his PCP who sent him for an MRI 2 days ago.  Patient called last night and told to go to ED for follow-up testing. Results were not discussed with him. Denies chest pain, fever, cough, recent travel.  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 Denies chronic alcohol use. Pt did at home H pylori test last week, came back positive.      PAST MEDICAL & SURGICAL HISTORY:  Obese  HTN (hypertension)  HLD (hyperlipidemia)  Hypothyroid  Status post tendon repair      MEDICATIONS  (STANDING):  atorvastatin 10 milliGRAM(s) Oral at bedtime  cefepime   IVPB 1000 milliGRAM(s) IV Intermittent every 8 hours  dextrose 5%. 1000 milliLiter(s) (50 mL/Hr) IV Continuous <Continuous>  dextrose 5%. 1000 milliLiter(s) (100 mL/Hr) IV Continuous <Continuous>  dextrose 50% Injectable 25 Gram(s) IV Push once  dextrose 50% Injectable 25 Gram(s) IV Push once  dextrose 50% Injectable 12.5 Gram(s) IV Push once  furosemide   Injectable 40 milliGRAM(s) IV Push daily  glucagon  Injectable 1 milliGRAM(s) IntraMuscular once  heparin   Injectable 5000 Unit(s) SubCutaneous every 8 hours  insulin lispro (ADMELOG) corrective regimen sliding scale   SubCutaneous three times a day before meals  LORazepam     Tablet 1 milliGRAM(s) Oral once  senna 2 Tablet(s) Oral at bedtime    MEDICATIONS  (PRN):  dextrose Oral Gel 15 Gram(s) Oral once PRN Blood Glucose LESS THAN 70 milliGRAM(s)/deciliter  HYDROmorphone   Tablet 2 milliGRAM(s) Oral every 6 hours PRN Moderate Pain (4 - 6)  HYDROmorphone  Injectable 0.5 milliGRAM(s) IV Push every 12 hours PRN Severe Pain (7 - 10)      FAMILY HISTORY:  Family history of breast cancer in mother (Mother)    Family history of throat cancer (Father)        SOCIAL HISTORY:    [x ] Non-smoker  [ ] Smoker  [ ] Alcohol    Allergies    No Known Allergies    Intolerances    	    REVIEW OF SYSTEMS:  CONSTITUTIONAL: No fever, weight loss, or fatigue  EYES: No eye pain, visual disturbances, or discharge  ENT:  No difficulty hearing, tinnitus, vertigo; No sinus or throat pain  NECK: No pain or stiffness  RESPIRATORY: No cough, wheezing, chills or hemoptysis; + Shortness of Breath  CARDIOVASCULAR: No chest pain, palpitations, passing out, dizziness, + leg swelling  GASTROINTESTINAL: No abdominal or epigastric pain. No nausea, vomiting, or hematemesis; No diarrhea or constipation. No melena or hematochezia.  GENITOURINARY: No dysuria, frequency, hematuria, or incontinence  NEUROLOGICAL: No headaches, memory loss, loss of strength, numbness, or tremors  SKIN: No itching, burning, rashes, or lesions   LYMPH Nodes: No enlarged glands  ENDOCRINE: No heat or cold intolerance; No hair loss  MUSCULOSKELETAL: No joint pain or swelling; No muscle, back, or extremity pain  PSYCHIATRIC: No depression, anxiety, mood swings, or difficulty sleeping  HEME/LYMPH: No easy bruising, or bleeding gums  ALLERGY AND IMMUNOLOGIC: No hives or eczema	    [x ] All others negative	  [ ] Unable to obtain    PHYSICAL EXAM:  T(C): 36.8 (08-23-24 @ 15:33), Max: 36.8 (08-23-24 @ 10:09)  HR: 98 (08-23-24 @ 15:33) (94 - 98)  BP: 135/74 (08-23-24 @ 15:33) (106/80 - 135/74)  RR: 18 (08-23-24 @ 15:33) (18 - 20)  SpO2: 94% (08-23-24 @ 15:33) (94% - 95%)  Wt(kg): --  I&O's Summary      Appearance: Normal	  HEENT:   Normal oral mucosa, PERRL, EOMI	  Lymphatic: No lymphadenopathy  Cardiovascular: Normal S1 S2, No JVD, + murmurs, + edema  Respiratory: rhonchi  Psychiatry: A & O x 3, Mood & affect appropriate  Gastrointestinal:  Soft, Non-tender, + BS	  Skin: No rashes, No ecchymoses, No cyanosis	  Neurologic: Non-focal  Extremities: Normal range of motion, No clubbing, cyanosis + edema  Vascular: Peripheral pulses palpable 2+ bilaterally    TELEMETRY: 	    ECG:  	  RADIOLOGY:  OTHER: 	  	  LABS:	 	    CARDIAC MARKERS:                              11.0   8.89  )-----------( 358      ( 23 Aug 2024 11:47 )             35.2     08-23    140  |  101  |  10  ----------------------------<  125<H>  3.8   |  28  |  0.71    Ca    9.0      23 Aug 2024 11:47    TPro  7.4  /  Alb  2.8<L>  /  TBili  0.5  /  DBili  x   /  AST  10  /  ALT  10  /  AlkPhos  146<H>  08-23    proBNP:   Lipid Profile:   HgA1c:   TSH:   PT/INR - ( 23 Aug 2024 11:47 )   PT: 13.6 sec;   INR: 1.24 ratio         PTT - ( 23 Aug 2024 11:47 )  PTT:40.5 sec    PREVIOUS DIAGNOSTIC TESTING:        < from: 12 Lead ECG (07.25.23 @ 10:16) >  Diagnosis Line Normal sinus rhythm  Normal ECG  No previous ECGs available    < from: CT Angio Chest PE Protocol w/ IV Cont (08.23.24 @ 16:11) >  No pulmonary embolism.    Right hemidiaphragm elevation with mild atelectasis involving the right   middle lobe and right base.    Trace right pleural effusion.    Partially imaged cystic hepatic lesions with largest measuring up to 22   cm. The exact etiology is unclear and diagnostic considerations include   infection and potentially metastasis. Correlate with outside abdominal   MRI from 2 days ago and consider CT abdomen and pelvis for further   assessment.

## 2024-08-24 LAB
A1C WITH ESTIMATED AVERAGE GLUCOSE RESULT: 8.5 % — HIGH (ref 4–5.6)
AFP-TM SERPL-MCNC: 2 NG/ML — SIGNIFICANT CHANGE UP
ANION GAP SERPL CALC-SCNC: 14 MMOL/L — SIGNIFICANT CHANGE UP (ref 5–17)
BUN SERPL-MCNC: 9 MG/DL — SIGNIFICANT CHANGE UP (ref 7–23)
CALCIUM SERPL-MCNC: 8.6 MG/DL — SIGNIFICANT CHANGE UP (ref 8.4–10.5)
CEA SERPL-MCNC: 1.1 NG/ML — SIGNIFICANT CHANGE UP (ref 0–3.8)
CHLORIDE SERPL-SCNC: 101 MMOL/L — SIGNIFICANT CHANGE UP (ref 96–108)
CO2 SERPL-SCNC: 26 MMOL/L — SIGNIFICANT CHANGE UP (ref 22–31)
CREAT SERPL-MCNC: 0.58 MG/DL — SIGNIFICANT CHANGE UP (ref 0.5–1.3)
CRP SERPL-MCNC: 197 MG/L — HIGH (ref 0–4)
EGFR: 109 ML/MIN/1.73M2 — SIGNIFICANT CHANGE UP
ESTIMATED AVERAGE GLUCOSE: 197 MG/DL — HIGH (ref 68–114)
GLUCOSE BLDC GLUCOMTR-MCNC: 105 MG/DL — HIGH (ref 70–99)
GLUCOSE BLDC GLUCOMTR-MCNC: 114 MG/DL — HIGH (ref 70–99)
GLUCOSE BLDC GLUCOMTR-MCNC: 135 MG/DL — HIGH (ref 70–99)
GLUCOSE BLDC GLUCOMTR-MCNC: 156 MG/DL — HIGH (ref 70–99)
GLUCOSE BLDC GLUCOMTR-MCNC: 164 MG/DL — HIGH (ref 70–99)
GLUCOSE SERPL-MCNC: 130 MG/DL — HIGH (ref 70–99)
HCT VFR BLD CALC: 34.9 % — LOW (ref 39–50)
HGB BLD-MCNC: 10.9 G/DL — LOW (ref 13–17)
MCHC RBC-ENTMCNC: 26 PG — LOW (ref 27–34)
MCHC RBC-ENTMCNC: 31.2 GM/DL — LOW (ref 32–36)
MCV RBC AUTO: 83.3 FL — SIGNIFICANT CHANGE UP (ref 80–100)
NRBC # BLD: 0 /100 WBCS — SIGNIFICANT CHANGE UP (ref 0–0)
PLATELET # BLD AUTO: 395 K/UL — SIGNIFICANT CHANGE UP (ref 150–400)
POTASSIUM SERPL-MCNC: 3.1 MMOL/L — LOW (ref 3.5–5.3)
POTASSIUM SERPL-SCNC: 3.1 MMOL/L — LOW (ref 3.5–5.3)
RBC # BLD: 4.19 M/UL — LOW (ref 4.2–5.8)
RBC # FLD: 15.4 % — HIGH (ref 10.3–14.5)
SODIUM SERPL-SCNC: 141 MMOL/L — SIGNIFICANT CHANGE UP (ref 135–145)
WBC # BLD: 8.06 K/UL — SIGNIFICANT CHANGE UP (ref 3.8–10.5)
WBC # FLD AUTO: 8.06 K/UL — SIGNIFICANT CHANGE UP (ref 3.8–10.5)

## 2024-08-24 PROCEDURE — 74177 CT ABD & PELVIS W/CONTRAST: CPT | Mod: 26

## 2024-08-24 PROCEDURE — 99221 1ST HOSP IP/OBS SF/LOW 40: CPT

## 2024-08-24 RX ORDER — POTASSIUM CHLORIDE 10 MEQ
40 TABLET, EXT RELEASE, PARTICLES/CRYSTALS ORAL ONCE
Refills: 0 | Status: COMPLETED | OUTPATIENT
Start: 2024-08-24 | End: 2024-08-24

## 2024-08-24 RX ADMIN — Medication 5000 UNIT(S): at 22:17

## 2024-08-24 RX ADMIN — CEFEPIME 100 MILLIGRAM(S): 2 INJECTION, POWDER, FOR SOLUTION INTRAVENOUS at 14:30

## 2024-08-24 RX ADMIN — Medication 40 MILLIEQUIVALENT(S): at 18:15

## 2024-08-24 RX ADMIN — Medication 10 MILLIGRAM(S): at 22:17

## 2024-08-24 RX ADMIN — CEFEPIME 100 MILLIGRAM(S): 2 INJECTION, POWDER, FOR SOLUTION INTRAVENOUS at 22:16

## 2024-08-24 RX ADMIN — Medication 5000 UNIT(S): at 14:30

## 2024-08-24 RX ADMIN — Medication 1: at 09:02

## 2024-08-24 RX ADMIN — Medication 2 TABLET(S): at 22:17

## 2024-08-24 RX ADMIN — Medication 40 MILLIGRAM(S): at 06:06

## 2024-08-24 RX ADMIN — Medication 5000 UNIT(S): at 06:06

## 2024-08-24 RX ADMIN — CEFEPIME 100 MILLIGRAM(S): 2 INJECTION, POWDER, FOR SOLUTION INTRAVENOUS at 06:06

## 2024-08-24 NOTE — PROGRESS NOTE ADULT - SUBJECTIVE AND OBJECTIVE BOX
Date of Service: 08-24-24 @ 11:46           CARDIOLOGY     PROGRESS  NOTE   ________________________________________________    CHIEF COMPLAINT:Patient is a 64y old  Male who presents with a chief complaint of abd  pain (24 Aug 2024 09:16)  no complain  	  REVIEW OF SYSTEMS:  CONSTITUTIONAL: No fever, weight loss, or fatigue  EYES: No eye pain, visual disturbances, or discharge  ENT:  No difficulty hearing, tinnitus, vertigo; No sinus or throat pain  NECK: No pain or stiffness  RESPIRATORY: No cough, wheezing, chills or hemoptysis; + Shortness of Breath  CARDIOVASCULAR: No chest pain, palpitations, passing out, dizziness, +_ leg swelling  GASTROINTESTINAL: No abdominal or epigastric pain. No nausea, vomiting, or hematemesis; No diarrhea or constipation. No melena or hematochezia.  GENITOURINARY: No dysuria, frequency, hematuria, or incontinence  NEUROLOGICAL: No headaches, memory loss, loss of strength, numbness, or tremors  SKIN: No itching, burning, rashes, or lesions   LYMPH Nodes: No enlarged glands  ENDOCRINE: No heat or cold intolerance; No hair loss  MUSCULOSKELETAL: No joint pain or swelling; No muscle, back, or extremity pain  PSYCHIATRIC: No depression, anxiety, mood swings, or difficulty sleeping  HEME/LYMPH: No easy bruising, or bleeding gums  ALLERGY AND IMMUNOLOGIC: No hives or eczema	    [x ] All others negative	  [ ] Unable to obtain    PHYSICAL EXAM:  T(C): 37 (08-24-24 @ 05:04), Max: 37.1 (08-23-24 @ 20:55)  HR: 82 (08-24-24 @ 05:04) (82 - 98)  BP: 119/87 (08-24-24 @ 05:04) (119/87 - 136/86)  RR: 18 (08-24-24 @ 05:04) (18 - 18)  SpO2: 93% (08-24-24 @ 05:04) (93% - 96%)  Wt(kg): --  I&O's Summary      Appearance: Normal	  HEENT:   Normal oral mucosa, PERRL, EOMI	  Lymphatic: No lymphadenopathy  Cardiovascular: Normal S1 S2, No JVD, +murmurs, + edema  Respiratory: rhonchi  Psychiatry: A & O x 3, Mood & affect appropriate  Gastrointestinal:  Soft, Non-tender, + BS	  Skin: No rashes, No ecchymoses, No cyanosis	  Neurologic: Non-focal  Extremities: Normal range of motion, No clubbing, cyanosis , + edema  Vascular: Peripheral pulses palpable 2+ bilaterally    MEDICATIONS  (STANDING):  atorvastatin 10 milliGRAM(s) Oral at bedtime  cefepime   IVPB 1000 milliGRAM(s) IV Intermittent every 8 hours  dextrose 5%. 1000 milliLiter(s) (100 mL/Hr) IV Continuous <Continuous>  dextrose 5%. 1000 milliLiter(s) (50 mL/Hr) IV Continuous <Continuous>  dextrose 50% Injectable 25 Gram(s) IV Push once  dextrose 50% Injectable 12.5 Gram(s) IV Push once  dextrose 50% Injectable 25 Gram(s) IV Push once  furosemide   Injectable 40 milliGRAM(s) IV Push daily  glucagon  Injectable 1 milliGRAM(s) IntraMuscular once  heparin   Injectable 5000 Unit(s) SubCutaneous every 8 hours  insulin lispro (ADMELOG) corrective regimen sliding scale   SubCutaneous three times a day before meals  LORazepam     Tablet 1 milliGRAM(s) Oral once  potassium chloride    Tablet ER 40 milliEquivalent(s) Oral once  senna 2 Tablet(s) Oral at bedtime      TELEMETRY: 	    ECG:  	  RADIOLOGY:  OTHER: 	  	  LABS:	 	    CARDIAC MARKERS:                                10.9   8.06  )-----------( 395      ( 24 Aug 2024 06:51 )             34.9     08-24    141  |  101  |  9   ----------------------------<  130<H>  3.1<L>   |  26  |  0.58    Ca    8.6      24 Aug 2024 06:51    TPro  7.4  /  Alb  2.8<L>  /  TBili  0.5  /  DBili  x   /  AST  10  /  ALT  10  /  AlkPhos  146<H>  08-23    proBNP:   Lipid Profile:   HgA1c:   TSH:   PT/INR - ( 23 Aug 2024 11:47 )   PT: 13.6 sec;   INR: 1.24 ratio         PTT - ( 23 Aug 2024 11:47 )  PTT:40.5 sec    < from: 12 Lead ECG (08.23.24 @ 13:43) >  Diagnosis Line NORMAL SINUS RHYTHM  NO PREVIOUS ECGS AVAILABLE    < from: CT Angio Chest PE Protocol w/ IV Cont (08.23.24 @ 16:11) >  No pulmonary embolism.    Right hemidiaphragm elevation with mild atelectasis involving the right   middle lobe and right base.    Trace right pleural effusion.    Partially imaged cystic hepatic lesions with largest measuring up to 22   cm. The exact etiology is unclear and diagnostic considerations include   infection and potentially metastasis. Correlate with outside abdominal   MRI from 2 days ago and consider CT abdomen and pelvis for further   assessment.        Assessment and plan  ---------------------------  64-year-old male with PMHx of hypertension, hypercholesterolemia, hypothyroidism presenting with 2 months of abdominal distention and pain, night sweats, and weight loss of 25 pounds.  Endorses SOB with trouble taking deep breaths, improving. Patient is a former smoker, quit 1 year ago. + constipation, + dysuria intermittent x 1 month.  Last bowel movement 2 days ago, wnl. + flatulance.  Patient evaluated at [place] [facility] 1 month ago, had CT scan and US, told he had benign liver cysts and discharged. When abd discomfort  did not improve pt followed up with his PCP who sent him for an MRI 2 days ago.  Patient called last night and told to go to ED for follow-up testing. Results were not discussed with him. Denies chest pain, fever, cough, recent travel.  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 Denies chronic alcohol use. Pt did at home H pylori test last week, came back positive.  pt with hx of htn, dm with hepatic mass with  increasing sob and LE  edema  no cardiac dawn done before  awaiting repeat ecg, noted NSR  echo  tsh  lipid panel  will adjust bp meds  decrease lasix  dvt prophylaxis  awaiting  MRI of the abdomen and pelvis  awaiting ecgho

## 2024-08-24 NOTE — CONSULT NOTE ADULT - ASSESSMENT
64-year-old male with PMHx of hypertension, hypercholesterolemia, hypothyroidism presenting with pain abdomen,  weakness, weight loss since July. He started feeling unwell since July 4th.  He had significant weight loss of 25 pounds. former smoker, quit 1 year ago. He was  evaluated at [place] [facility] 1 month ago, had CT scan and US, told he had benign liver cysts and discharged. due  to  persistent  abd  pain,  his  pcp,   did  an  MRI 2 days ago.. pt  was    called last night and told to go to  er  for follow-up testing. denies chest pain, fever, cough, recent travel. (23 Aug 2024 13:38)    Has been afebrile, NO leucocytosis, preserved renal/liver functions,       # Multiple liver and upper abdominal cyst    - No systemic signs of infection, unclear etiology of the cystic lesions  - Low concern for parasitic infection  - Continue cefepime  - follow up MRI  - will need drainage/sampling of the lesions; please send specimen for cell counts/differentials, cultures  - follow up blood cultures      discussed with attending    Chang Estrada MD, PGY-5  ID Fellow  [employer] Preferred  After 5pm/weekends call [phone]

## 2024-08-24 NOTE — CONSULT NOTE ADULT - SUBJECTIVE AND OBJECTIVE BOX
Patient is a 64y old  Male who presents with a chief complaint of abd  pain (24 Aug 2024 08:01)    HPI:  64-year-old male with PMHx of hypertension, hypercholesterolemia, hypothyroidism presenting with 2 months of abdominal distention and pain, night sweats, and weight loss of 25 pounds. former smoker, quit 1 year ago. ,  had   dysuria intermittent x 1 month. was  evaluated at [place] [facility] 1 month ago, had CT scan and US, told he had benign liver cysts and discharged. due  to  persistent  abd  pain,  his  pcp,   did  an  MRI 2 days ago.. pt  was    called last night and told to go to  er  for follow-up testing. denies chest pain, fever, cough, recent travel. (23 Aug 2024 13:38)    Has been afebrile, NO leucocytosis, preserved renal/liver functions,        prior hospital charts reviewed [  ]  primary team notes reviewed [ x ]  other consultant notes reviewed [ x ]    PAST MEDICAL & SURGICAL HISTORY:  Obese      HTN (hypertension)      HLD (hyperlipidemia)      Hypothyroid      Status post tendon repair          Allergies  No Known Allergies    ANTIMICROBIALS (past 90 days)  MEDICATIONS  (STANDING):  cefepime   IVPB   100 mL/Hr IV Intermittent (08-24-24 @ 06:06)   100 mL/Hr IV Intermittent (08-23-24 @ 23:22)        cefepime   IVPB 1000 every 8 hours    MEDICATIONS  (STANDING):  atorvastatin 10 at bedtime  dextrose 50% Injectable 25 once  dextrose 50% Injectable 12.5 once  dextrose 50% Injectable 25 once  dextrose Oral Gel 15 once PRN  furosemide   Injectable 40 daily  glucagon  Injectable 1 once  heparin   Injectable 5000 every 8 hours  HYDROmorphone   Tablet 2 every 6 hours PRN  HYDROmorphone  Injectable 0.5 every 12 hours PRN  insulin lispro (ADMELOG) corrective regimen sliding scale  three times a day before meals  LORazepam     Tablet 1 once  senna 2 at bedtime    SOCIAL HISTORY:       FAMILY HISTORY:  Family history of breast cancer in mother (Mother)    Family history of throat cancer (Father)      REVIEW OF SYSTEMS  [  ] ROS unobtainable because:    [  ] All other systems negative except as noted below:	    Constitutional:  [ ] fever [ ] chills  [ ] weight loss  [ ] weakness  Skin:  [ ] rash [ ] phlebitis	  Eyes: [ ] icterus [ ] pain  [ ] discharge	  ENMT: [ ] sore throat  [ ] thrush [ ] ulcers [ ] exudates  Respiratory: [ ] dyspnea [ ] hemoptysis [ ] cough [ ] sputum	  Cardiovascular:  [ ] chest pain [ ] palpitations [ ] edema	  Gastrointestinal:  [ ] nausea [ ] vomiting [ ] diarrhea [ ] constipation [ ] pain	  Genitourinary:  [ ] dysuria [ ] frequency [ ] hematuria [ ] discharge [ ] flank pain  [ ] incontinence  Musculoskeletal:  [ ] myalgias [ ] arthralgias [ ] arthritis  [ ] back pain  Neurological:  [ ] headache [ ] seizures  [ ] confusion/altered mental status  Psychiatric:  [ ] anxiety [ ] depression	  Hematology/Lymphatics:  [ ] lymphadenopathy  Endocrine:  [ ] adrenal [ ] thyroid  Allergic/Immunologic:	 [ ] transplant [ ] seasonal    Vital Signs Last 24 Hrs  T(F): 98.6 (08-24-24 @ 05:04), Max: 98.8 (08-23-24 @ 20:55)  Vital Signs Last 24 Hrs  HR: 82 (08-24-24 @ 05:04) (82 - 98)  BP: 119/87 (08-24-24 @ 05:04) (106/80 - 136/86)  RR: 18 (08-24-24 @ 05:04)  SpO2: 93% (08-24-24 @ 05:04) (93% - 96%)  Wt(kg): --    PHYSICAL EXAM:                              10.9   8.06  )-----------( 395      ( 24 Aug 2024 06:51 )             34.9   08-24    141  |  101  |  9   ----------------------------<  130<H>  3.1<L>   |  26  |  0.58    Ca    8.6      24 Aug 2024 06:51    TPro  7.4  /  Alb  2.8<L>  /  TBili  0.5  /  DBili  x   /  AST  10  /  ALT  10  /  AlkPhos  146<H>  08-23    Urinalysis Basic - ( 24 Aug 2024 06:51 )    Color: x / Appearance: x / SG: x / pH: x  Gluc: 130 mg/dL / Ketone: x  / Bili: x / Urobili: x   Blood: x / Protein: x / Nitrite: x   Leuk Esterase: x / RBC: x / WBC x   Sq Epi: x / Non Sq Epi: x / Bacteria: x    MICROBIOLOGY:              RADIOLOGY:  imaging below personally reviewed and agree with findings    < from: CT Angio Chest PE Protocol w/ IV Cont (08.23.24 @ 16:11) >  No pulmonary embolism.    Right hemidiaphragm elevation with mild atelectasis involving the right   middle lobe and right base.    Trace right pleural effusion.    Partially imaged cystic hepatic lesions with largest measuring up to 22   cm. The exact etiology is unclear and diagnostic considerations include   infection and potentially metastasis. Correlate with outside abdominal   MRI from 2 days ago and consider CT abdomen and pelvis for further   assessment.       Patient is a 64y old  Male who presents with a chief complaint of abd  pain (24 Aug 2024 08:01)    HPI:  64-year-old male with PMHx of hypertension, hypercholesterolemia, hypothyroidism presenting with pain abdomen,  weakness, weight loss since July. He started feeling unwell since July 4th.  He had significant weight loss of 25 pounds. former smoker, quit 1 year ago. He was  evaluated at [place] [facility] 1 month ago, had CT scan and US, told he had benign liver cysts and discharged. due  to  persistent  abd  pain,  his  pcp,   did  an  MRI 2 days ago.. pt  was    called last night and told to go to  er  for follow-up testing. denies chest pain, fever, cough, recent travel. (23 Aug 2024 13:38)    Has been afebrile, NO leucocytosis, preserved renal/liver functions,        prior hospital charts reviewed [  ]  primary team notes reviewed [ x ]  other consultant notes reviewed [ x ]    PAST MEDICAL & SURGICAL HISTORY:  Obese      HTN (hypertension)      HLD (hyperlipidemia)      Hypothyroid      Status post tendon repair          Allergies  No Known Allergies    ANTIMICROBIALS (past 90 days)  MEDICATIONS  (STANDING):  cefepime   IVPB   100 mL/Hr IV Intermittent (08-24-24 @ 06:06)   100 mL/Hr IV Intermittent (08-23-24 @ 23:22)        cefepime   IVPB 1000 every 8 hours    MEDICATIONS  (STANDING):  atorvastatin 10 at bedtime  dextrose 50% Injectable 25 once  dextrose 50% Injectable 12.5 once  dextrose 50% Injectable 25 once  dextrose Oral Gel 15 once PRN  furosemide   Injectable 40 daily  glucagon  Injectable 1 once  heparin   Injectable 5000 every 8 hours  HYDROmorphone   Tablet 2 every 6 hours PRN  HYDROmorphone  Injectable 0.5 every 12 hours PRN  insulin lispro (ADMELOG) corrective regimen sliding scale  three times a day before meals  LORazepam     Tablet 1 once  senna 2 at bedtime    SOCIAL HISTORY: Lives with his wife, born and lived all his life in [place], retied , no travels    FAMILY HISTORY:  Family history of breast cancer in mother (Mother)    Family history of throat cancer (Father)      REVIEW OF SYSTEMS  [  ] ROS unobtainable because:    [ x ] All other systems negative except as noted below:	    Constitutional:  [ ] fever [ ] chills  [ ] weight loss  [x ] weakness  Skin:  [ ] rash [ ] phlebitis	  Eyes: [ ] icterus [ ] pain  [ ] discharge	  ENMT: [ ] sore throat  [ ] thrush [ ] ulcers [ ] exudates  Respiratory: [ ] dyspnea [ ] hemoptysis [ ] cough [ ] sputum	  Cardiovascular:  [ ] chest pain [ ] palpitations [ ] edema	  Gastrointestinal:  [ ] nausea [ ] vomiting [ ] diarrhea [ ] constipation [x ] pain	  Genitourinary:  [ ] dysuria [ ] frequency [ ] hematuria [ ] discharge [ ] flank pain  [ ] incontinence  Musculoskeletal:  [ ] myalgias [ ] arthralgias [ ] arthritis  [ ] back pain  Neurological:  [ ] headache [ ] seizures  [ ] confusion/altered mental status  Psychiatric:  [ ] anxiety [ ] depression	  Hematology/Lymphatics:  [ ] lymphadenopathy  Endocrine:  [ ] adrenal [ ] thyroid  Allergic/Immunologic:	 [ ] transplant [ ] seasonal    Vital Signs Last 24 Hrs  T(F): 98.6 (08-24-24 @ 05:04), Max: 98.8 (08-23-24 @ 20:55)  Vital Signs Last 24 Hrs  HR: 82 (08-24-24 @ 05:04) (82 - 98)  BP: 119/87 (08-24-24 @ 05:04) (106/80 - 136/86)  RR: 18 (08-24-24 @ 05:04)  SpO2: 93% (08-24-24 @ 05:04) (93% - 96%)  Wt(kg): --    PHYSICAL EXAM:    General: Patient in NAD  HEENT: NCAT, EOMI, PERRL, no oral lesions  CV: S1+S2, no m/r/g appreciated   Lungs: No respiratory distress, CTAB  Abd: Soft, mild tenderness RUQ    : No suprapubic tenderness  Neuro: Alert and oriented to time, place and person. Moves all extremities against gravity.  Ext: No cyanosis, no edema  Skin: No rash, no phlebitis                              10.9   8.06  )-----------( 395      ( 24 Aug 2024 06:51 )             34.9   08-24    141  |  101  |  9   ----------------------------<  130<H>  3.1<L>   |  26  |  0.58    Ca    8.6      24 Aug 2024 06:51    TPro  7.4  /  Alb  2.8<L>  /  TBili  0.5  /  DBili  x   /  AST  10  /  ALT  10  /  AlkPhos  146<H>  08-23    Urinalysis Basic - ( 24 Aug 2024 06:51 )    Color: x / Appearance: x / SG: x / pH: x  Gluc: 130 mg/dL / Ketone: x  / Bili: x / Urobili: x   Blood: x / Protein: x / Nitrite: x   Leuk Esterase: x / RBC: x / WBC x   Sq Epi: x / Non Sq Epi: x / Bacteria: x    MICROBIOLOGY:              RADIOLOGY:  imaging below personally reviewed and agree with findings    < from: CT Angio Chest PE Protocol w/ IV Cont (08.23.24 @ 16:11) >  No pulmonary embolism.    Right hemidiaphragm elevation with mild atelectasis involving the right   middle lobe and right base.    Trace right pleural effusion.    Partially imaged cystic hepatic lesions with largest measuring up to 22   cm. The exact etiology is unclear and diagnostic considerations include   infection and potentially metastasis. Correlate with outside abdominal   MRI from 2 days ago and consider CT abdomen and pelvis for further   assessment.

## 2024-08-24 NOTE — PROGRESS NOTE ADULT - SUBJECTIVE AND OBJECTIVE BOX
date of service: 08-24-24 @ 08:01  [facility]  REVIEW OF SYSTEMS:  CONSTITUTIONAL: No fever,  no  weight loss  ENT:  No  tinnitus,   no   vertigo  NECK: No pain or stiffness  RESPIRATORY: No cough, wheezing, chills or hemoptysis;    No Shortness of Breath  CARDIOVASCULAR: No chest pain, palpitations, dizziness  GASTROINTESTINAL: No abdominal or epigastric pain. No nausea, vomiting, or hematemesis; No diarrhea  No melena or hematochezia.  GENITOURINARY: No dysuria, frequency, hematuria, or incontinence  NEUROLOGICAL: No headaches  SKIN: No itching,  no   rash  LYMPH Nodes: No enlarged glands  ENDOCRINE: No heat or cold intolerance  MUSCULOSKELETAL: No joint pain or swelling  PSYCHIATRIC: No depression, anxiety  HEME/LYMPH: No easy bruising, or bleeding gums  ALLERGY AND IMMUNOLOGIC: No hives or eczema	    MEDICATIONS  (STANDING):  atorvastatin 10 milliGRAM(s) Oral at bedtime  cefepime   IVPB 1000 milliGRAM(s) IV Intermittent every 8 hours  dextrose 5%. 1000 milliLiter(s) (50 mL/Hr) IV Continuous <Continuous>  dextrose 5%. 1000 milliLiter(s) (100 mL/Hr) IV Continuous <Continuous>  dextrose 50% Injectable 25 Gram(s) IV Push once  dextrose 50% Injectable 12.5 Gram(s) IV Push once  dextrose 50% Injectable 25 Gram(s) IV Push once  furosemide   Injectable 40 milliGRAM(s) IV Push daily  glucagon  Injectable 1 milliGRAM(s) IntraMuscular once  heparin   Injectable 5000 Unit(s) SubCutaneous every 8 hours  insulin lispro (ADMELOG) corrective regimen sliding scale   SubCutaneous three times a day before meals  LORazepam     Tablet 1 milliGRAM(s) Oral once  potassium chloride    Tablet ER 40 milliEquivalent(s) Oral once  senna 2 Tablet(s) Oral at bedtime    MEDICATIONS  (PRN):  dextrose Oral Gel 15 Gram(s) Oral once PRN Blood Glucose LESS THAN 70 milliGRAM(s)/deciliter  HYDROmorphone   Tablet 2 milliGRAM(s) Oral every 6 hours PRN Moderate Pain (4 - 6)  HYDROmorphone  Injectable 0.5 milliGRAM(s) IV Push every 12 hours PRN Severe Pain (7 - 10)      Vital Signs Last 24 Hrs  T(C): 37 (24 Aug 2024 05:04), Max: 37.1 (23 Aug 2024 20:55)  T(F): 98.6 (24 Aug 2024 05:04), Max: 98.8 (23 Aug 2024 20:55)  HR: 82 (24 Aug 2024 05:04) (82 - 98)  BP: 119/87 (24 Aug 2024 05:04) (106/80 - 136/86)  BP(mean): --  RR: 18 (24 Aug 2024 05:04) (18 - 20)  SpO2: 93% (24 Aug 2024 05:04) (93% - 96%)    Parameters below as of 24 Aug 2024 05:04  Patient On (Oxygen Delivery Method): room air      CAPILLARY BLOOD GLUCOSE      POCT Blood Glucose.: 135 mg/dL (24 Aug 2024 00:11)    I&O's Summary        Appearance: Normal	  HEENT:   Normal oral mucosa, PERRL, EOMI	  Lymphatic: No lymphadenopathy  Cardiovascular: Normal S1 S2, No JVD  Respiratory: Lungs clear to auscultation	  Gastrointestinal:  Soft, Non-tender, + BS	  Skin: No rash, No ecchymoses	  Extremities:     LABS:                        10.9   8.06  )-----------( 395      ( 24 Aug 2024 06:51 )             34.9     08-24    141  |  101  |  9   ----------------------------<  130<H>  3.1<L>   |  26  |  0.58    Ca    8.6      24 Aug 2024 06:51    TPro  7.4  /  Alb  2.8<L>  /  TBili  0.5  /  DBili  x   /  AST  10  /  ALT  10  /  AlkPhos  146<H>  08-23    PT/INR - ( 23 Aug 2024 11:47 )   PT: 13.6 sec;   INR: 1.24 ratio         PTT - ( 23 Aug 2024 11:47 )  PTT:40.5 sec      Urinalysis Basic - ( 24 Aug 2024 06:51 )    Color: x / Appearance: x / SG: x / pH: x  Gluc: 130 mg/dL / Ketone: x  / Bili: x / Urobili: x   Blood: x / Protein: x / Nitrite: x   Leuk Esterase: x / RBC: x / WBC x   Sq Epi: x / Non Sq Epi: x / Bacteria: x                      Consultant(s) Notes Reviewed:      Care Discussed with Consultants/Other Providers:

## 2024-08-24 NOTE — PROGRESS NOTE ADULT - ASSESSMENT
64-year-old male     h/o  HTN.  HLD,  hypothyroidism,  ex  smoker . quit  a yr  ago            had  2 months ,  abdominal distention, pain, night sweats, and weight loss of 25 pounds.          was  evaluated at [place]   er  1 month ago, had CT scan and US, told he had benign liver cysts and discharged.        persistent  abd  pain,  his  pcp,   did  an  MRI  2 days ago.. pt  wastold to go to  er   /   denies chest pain, fever, cough, recent travel.       abd  pain,  from  cysts       MRI    8/21/24.   posterior stomach,  heterogenous cyst  9.2 x 6.4cm; upper pelvis heterogenous cyst, just right of midline 6.4 x 4.0 cm              right and trace left pleural effusion. Few cystic/fluid filled structures within the liver measuring 4.4cm.  the lateral side,   21 x 14 cm cystic collection. abutting/adjacent to the stomach,              cystic fluid structure measuring 9x6 cm ,   appears to be heterogenous with debris. In  upper pelvis  there is a cystic fluid structure heterogenous with debris 6x4cm concer  for neoplasm,             house    gi   eval,  need  to  r/o  malignant  process/  ID  eval    r/o infectious  process         pedal  edema/  sob   on exertion,  , for past  month or  so              ?  acute  diastolic    chf.  .  on   iv lasix       HTN/  HLD       Hypothyroid      DM,  follow  fs         ekg.    nsr, low   voltage   /  echo          Ct  chest  angio,  no pe,  hepatic  cysts.  upto  2 2 cm         MRI  a/p, pending        on  empiric  iv  cefepime.   ID  eval  today. / infectious  cause/  cysts       dvt  ppx               rad< from: CT Angio Chest PE Protocol w/ IV Cont (08.23.24 @ 16:11) >  MPRESSION:  No pulmonary embolism.  Right hemidiaphragm elevation with mild atelectasis involving the right   middle lobe and right base.  Trace right pleural effusion.  Partially imaged cystic hepatic lesions with largest measuring up to 22   cm. The exact etiology is unclear and diagnostic considerations include   infection and potentially metastasis. Correlate with outside abdominal   MRI from 2 days ago and consider CT abdomen and pelvis for further   assessment  --- End of Report ---      <                   64-year-old male     h/o  HTN.  HLD,  hypothyroidism,  ex  smoker . quit  a yr  ago            had  2 months ,  abdominal distention, pain, night sweats, and weight loss of 25 pounds.          was  evaluated at [place]   er  1 month ago, had CT scan and US, told he had benign liver cysts and discharged.        persistent  abd  pain,  his  pcp,   did  an  MRI  2 days ago.. pt  wastold to go to  er   /   denies chest pain, fever, cough, recent travel.       abd  pain,  from  cysts       MRI    8/21/24.   posterior stomach,  heterogenous cyst  9.2 x 6.4cm; upper pelvis heterogenous cyst, just right of midline 6.4 x 4.0 cm              right and trace left pleural effusion. Few cystic/fluid filled structures within the liver measuring 4.4cm.  the lateral side,   21 x 14 cm cystic collection. abutting/adjacent to the stomach,              cystic fluid structure measuring 9x6 cm ,   appears to be heterogenous with debris. In  upper pelvis  there is a cystic fluid structure heterogenous with debris 6x4cm concer  for neoplasm,             house    gi   eval,  need  to  r/o  malignant  process/  ID  eval    r/o infectious  process         pedal  edema/  sob   on exertion,  , for past  month or  so              ?  acute  diastolic    chf.  .  on   iv lasix       HTN/  HLD       Hypothyroid      DM,  follow  fs         ekg.    nsr, low   voltage   /  echo          Ct  chest  angio,  no pe,  hepatic  cysts.  upto  2 2 cm         MRI  a/p  and  Ct  a/p , pending.  spoke  to  radiologist        on  empiric  iv  cefepime.   ID f/p,  / infectious  cause/  cysts       dvt  ppx               rad< from: CT Angio Chest PE Protocol w/ IV Cont (08.23.24 @ 16:11) >  MPRESSION:  No pulmonary embolism.  Right hemidiaphragm elevation with mild atelectasis involving the right   middle lobe and right base.  Trace right pleural effusion.  Partially imaged cystic hepatic lesions with largest measuring up to 22   cm. The exact etiology is unclear and diagnostic considerations include   infection and potentially metastasis. Correlate with outside abdominal   MRI from 2 days ago and consider CT abdomen and pelvis for further   assessment  --- End of Report ---      <

## 2024-08-25 LAB
ANION GAP SERPL CALC-SCNC: 12 MMOL/L — SIGNIFICANT CHANGE UP (ref 5–17)
BUN SERPL-MCNC: 11 MG/DL — SIGNIFICANT CHANGE UP (ref 7–23)
CALCIUM SERPL-MCNC: 8.7 MG/DL — SIGNIFICANT CHANGE UP (ref 8.4–10.5)
CHLORIDE SERPL-SCNC: 100 MMOL/L — SIGNIFICANT CHANGE UP (ref 96–108)
CO2 SERPL-SCNC: 28 MMOL/L — SIGNIFICANT CHANGE UP (ref 22–31)
CREAT SERPL-MCNC: 0.64 MG/DL — SIGNIFICANT CHANGE UP (ref 0.5–1.3)
EGFR: 106 ML/MIN/1.73M2 — SIGNIFICANT CHANGE UP
GLUCOSE BLDC GLUCOMTR-MCNC: 117 MG/DL — HIGH (ref 70–99)
GLUCOSE BLDC GLUCOMTR-MCNC: 135 MG/DL — HIGH (ref 70–99)
GLUCOSE BLDC GLUCOMTR-MCNC: 152 MG/DL — HIGH (ref 70–99)
GLUCOSE BLDC GLUCOMTR-MCNC: 152 MG/DL — HIGH (ref 70–99)
GLUCOSE SERPL-MCNC: 143 MG/DL — HIGH (ref 70–99)
HCT VFR BLD CALC: 34.5 % — LOW (ref 39–50)
HGB BLD-MCNC: 10.9 G/DL — LOW (ref 13–17)
MCHC RBC-ENTMCNC: 26.5 PG — LOW (ref 27–34)
MCHC RBC-ENTMCNC: 31.6 GM/DL — LOW (ref 32–36)
MCV RBC AUTO: 83.7 FL — SIGNIFICANT CHANGE UP (ref 80–100)
NRBC # BLD: 0 /100 WBCS — SIGNIFICANT CHANGE UP (ref 0–0)
PLATELET # BLD AUTO: 360 K/UL — SIGNIFICANT CHANGE UP (ref 150–400)
POTASSIUM SERPL-MCNC: 3.4 MMOL/L — LOW (ref 3.5–5.3)
POTASSIUM SERPL-SCNC: 3.4 MMOL/L — LOW (ref 3.5–5.3)
RBC # BLD: 4.12 M/UL — LOW (ref 4.2–5.8)
RBC # FLD: 15.2 % — HIGH (ref 10.3–14.5)
SODIUM SERPL-SCNC: 140 MMOL/L — SIGNIFICANT CHANGE UP (ref 135–145)
WBC # BLD: 8.21 K/UL — SIGNIFICANT CHANGE UP (ref 3.8–10.5)
WBC # FLD AUTO: 8.21 K/UL — SIGNIFICANT CHANGE UP (ref 3.8–10.5)

## 2024-08-25 RX ORDER — POTASSIUM CHLORIDE 10 MEQ
40 TABLET, EXT RELEASE, PARTICLES/CRYSTALS ORAL ONCE
Refills: 0 | Status: COMPLETED | OUTPATIENT
Start: 2024-08-25 | End: 2024-08-25

## 2024-08-25 RX ADMIN — CEFEPIME 100 MILLIGRAM(S): 2 INJECTION, POWDER, FOR SOLUTION INTRAVENOUS at 21:36

## 2024-08-25 RX ADMIN — Medication 2 TABLET(S): at 21:36

## 2024-08-25 RX ADMIN — CEFEPIME 100 MILLIGRAM(S): 2 INJECTION, POWDER, FOR SOLUTION INTRAVENOUS at 13:44

## 2024-08-25 RX ADMIN — Medication 40 MILLIGRAM(S): at 05:42

## 2024-08-25 RX ADMIN — Medication 1: at 08:59

## 2024-08-25 RX ADMIN — Medication 10 MILLIGRAM(S): at 21:36

## 2024-08-25 RX ADMIN — Medication 5000 UNIT(S): at 05:42

## 2024-08-25 RX ADMIN — Medication 5000 UNIT(S): at 21:36

## 2024-08-25 RX ADMIN — Medication 40 MILLIEQUIVALENT(S): at 13:44

## 2024-08-25 RX ADMIN — CEFEPIME 100 MILLIGRAM(S): 2 INJECTION, POWDER, FOR SOLUTION INTRAVENOUS at 05:42

## 2024-08-25 RX ADMIN — Medication 5000 UNIT(S): at 13:44

## 2024-08-25 NOTE — PROGRESS NOTE ADULT - SUBJECTIVE AND OBJECTIVE BOX
Date of Service: 08-25-24 @ 11:09           CARDIOLOGY     PROGRESS  NOTE   ________________________________________________    CHIEF COMPLAINT:Patient is a 64y old  Male who presents with a chief complaint of abd  pain (25 Aug 2024 06:38)  doing better  	  REVIEW OF SYSTEMS:  CONSTITUTIONAL: No fever, weight loss, or fatigue  EYES: No eye pain, visual disturbances, or discharge  ENT:  No difficulty hearing, tinnitus, vertigo; No sinus or throat pain  NECK: No pain or stiffness  RESPIRATORY: No cough, wheezing, chills or hemoptysis; +  Shortness of Breath  CARDIOVASCULAR: No chest pain, palpitations, passing out, dizziness, or leg swelling  GASTROINTESTINAL: No abdominal or epigastric pain. No nausea, vomiting, or hematemesis; No diarrhea or constipation. No melena or hematochezia.  GENITOURINARY: No dysuria, frequency, hematuria, or incontinence  NEUROLOGICAL: No headaches, memory loss, loss of strength, numbness, or tremors  SKIN: No itching, burning, rashes, or lesions   LYMPH Nodes: No enlarged glands  ENDOCRINE: No heat or cold intolerance; No hair loss  MUSCULOSKELETAL: No joint pain or swelling; No muscle, back, or extremity pain  PSYCHIATRIC: No depression, anxiety, mood swings, or difficulty sleeping  HEME/LYMPH: No easy bruising, or bleeding gums  ALLERGY AND IMMUNOLOGIC: No hives or eczema	    [x ] All others negative	  [ ] Unable to obtain    PHYSICAL EXAM:  T(C): 36.6 (08-25-24 @ 04:25), Max: 36.8 (08-24-24 @ 13:26)  HR: 94 (08-25-24 @ 04:25) (88 - 94)  BP: 114/77 (08-25-24 @ 04:25) (109/76 - 114/77)  RR: 18 (08-25-24 @ 04:25) (18 - 18)  SpO2: 94% (08-25-24 @ 04:25) (94% - 98%)  Wt(kg): --  I&O's Summary    24 Aug 2024 07:01  -  25 Aug 2024 07:00  --------------------------------------------------------  IN: 400 mL / OUT: 0 mL / NET: 400 mL        Appearance: Normal	  HEENT:   Normal oral mucosa, PERRL, EOMI	  Lymphatic: No lymphadenopathy  Cardiovascular: Normal S1 S2, No JVD, No murmurs, + edema  Respiratory: rhonchi  Psychiatry: A & O x 3, Mood & affect appropriate  Gastrointestinal:  Soft, Non-tender, + BS	  Skin: No rashes, No ecchymoses, No cyanosis	  Extremities: Normal range of motion, No clubbing, cyanosis , +edema  Vascular: Peripheral pulses palpable 2+ bilaterally    MEDICATIONS  (STANDING):  atorvastatin 10 milliGRAM(s) Oral at bedtime  cefepime   IVPB 1000 milliGRAM(s) IV Intermittent every 8 hours  dextrose 5%. 1000 milliLiter(s) (50 mL/Hr) IV Continuous <Continuous>  dextrose 5%. 1000 milliLiter(s) (100 mL/Hr) IV Continuous <Continuous>  dextrose 50% Injectable 25 Gram(s) IV Push once  dextrose 50% Injectable 12.5 Gram(s) IV Push once  dextrose 50% Injectable 25 Gram(s) IV Push once  furosemide   Injectable 40 milliGRAM(s) IV Push daily  glucagon  Injectable 1 milliGRAM(s) IntraMuscular once  heparin   Injectable 5000 Unit(s) SubCutaneous every 8 hours  insulin lispro (ADMELOG) corrective regimen sliding scale   SubCutaneous three times a day before meals  LORazepam     Tablet 1 milliGRAM(s) Oral once  potassium chloride    Tablet ER 40 milliEquivalent(s) Oral once  senna 2 Tablet(s) Oral at bedtime      TELEMETRY: 	    ECG:  	  RADIOLOGY:  OTHER: 	  	  LABS:	 	    CARDIAC MARKERS:                          10.9   8.21  )-----------( 360      ( 25 Aug 2024 07:10 )             34.5     08-25    140  |  100  |  11  ----------------------------<  143<H>  3.4<L>   |  28  |  0.64    Ca    8.7      25 Aug 2024 07:10    TPro  7.4  /  Alb  2.8<L>  /  TBili  0.5  /  DBili  x   /  AST  10  /  ALT  10  /  AlkPhos  146<H>  08-23    proBNP:   Lipid Profile:   HgA1c:   TSH:   PT/INR - ( 23 Aug 2024 11:47 )   PT: 13.6 sec;   INR: 1.24 ratio         PTT - ( 23 Aug 2024 11:47 )  PTT:40.5 sec    < from: CT Angio Chest PE Protocol w/ IV Cont (08.23.24 @ 16:11) >  No pulmonary embolism.    Right hemidiaphragm elevation with mild atelectasis involving the right   middle lobe and right base.    Trace right pleural effusion.    Partially imaged cystic hepatic lesions with largest measuring up to 22   cm. The exact etiology is unclear and diagnostic considerations include   infection and potentially metastasis. Correlate with outside abdominal   MRI from 2 days ago and consider CT abdomen and pelvis for further   assessment.        Assessment and plan  ---------------------------  64-year-old male with PMHx of hypertension, hypercholesterolemia, hypothyroidism presenting with 2 months of abdominal distention and pain, night sweats, and weight loss of 25 pounds.  Endorses SOB with trouble taking deep breaths, improving. Patient is a former smoker, quit 1 year ago. + constipation, + dysuria intermittent x 1 month.  Last bowel movement 2 days ago, wnl. + flatulance.  Patient evaluated at [place] ED 1 month ago, had CT scan and US, told he had benign liver cysts and discharged. When abd discomfort  did not improve pt followed up with his PCP who sent him for an MRI 2 days ago.  Patient called last night and told to go to ED for follow-up testing. Results were not discussed with him. Denies chest pain, fever, cough, recent travel.  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 Denies chronic alcohol use. Pt did at home H pylori test last week, came back positive.  pt with hx of htn, dm with hepatic mass with  increasing sob and LE  edema  no cardiac dawn done before  awaiting repeat ecg, noted NSR  echo  tsh  lipid panel  will adjust bp meds  decrease lasix  dvt prophylaxis  awaiting  MRI of the abdomen and pelvis  awaiting TTE  replete Lytes  awaiting MRI

## 2024-08-25 NOTE — CONSULT NOTE ADULT - ASSESSMENT
64M w/ PMH sig for DM, HTN, HLD, hypothyroidism presents with two months of right sided abdominal pain found to have multiple large liver cysts.     - Agree with the primary team to continue cardiac work up   - Please complete liver work up (hepatic panel) given hx of heavy EthOH use   - Patient may benefit from cyst fenestration, however probably will be done in outpatient setting  - Rest of care per primary       D/w Dr. Cramer     Surg Onc

## 2024-08-25 NOTE — PROGRESS NOTE ADULT - ASSESSMENT
64-year-old male     h/o  HTN.  HLD,  hypothyroidism,  ex  smoker . quit  a yr  ago            had  2 months ,  abdominal distention, pain, night sweats, and weight loss of 25 pounds.          was  evaluated at [facility]   er  1 month ago, had CT scan and US, told he had benign liver cysts and discharged.        persistent  abd  pain,  his  pcp,   did  an  MRI  2 days ago.. pt  wastold to go to  er   /   denies chest pain, fever, cough, recent travel.       abd  pain,  from  cysts       MRI    8/21/24.   posterior stomach,  heterogenous cyst  9.2 x 6.4cm; upper pelvis heterogenous cyst, just right of midline 6.4 x 4.0 cm              right and trace left pleural effusion. Few cystic/fluid filled structures within the liver measuring 4.4cm.  the lateral side,   21 x 14 cm cystic collection. abutting/adjacent to the stomach,              cystic fluid structure measuring 9x6 cm ,   appears to be heterogenous with debris. In  upper pelvis  there is a cystic fluid structure heterogenous with debris 6x4cm concer  for neoplasm,             house    gi   eval,  need  to  r/o  malignant  process/  ID  eval    r/o infectious  process         pedal  edema/  sob   on exertion,  , for past  month or  so              ?  acute  diastolic    chf.  .  on   iv lasix       HTN/  HLD       Hypothyroid      DM,  follow  fs         ekg.    nsr, low   voltage   /  echo          Ct  chest  angio,  no pe,  hepatic  cysts.  upto  2 2 cm        Ct  a/p , pending.  /  MRI pending       on  empiric  iv  cefepime.   ID f/p,  / infectious  cause/  cysts       dvt  ppx               rad< from: CT Angio Chest PE Protocol w/ IV Cont (08.23.24 @ 16:11) >  MPRESSION:  No pulmonary embolism.  Right hemidiaphragm elevation with mild atelectasis involving the right   middle lobe and right base.  Trace right pleural effusion.  Partially imaged cystic hepatic lesions with largest measuring up to 22   cm. The exact etiology is unclear and diagnostic considerations include   infection and potentially metastasis. Correlate with outside abdominal   MRI from 2 days ago and consider CT abdomen and pelvis for further   assessment  --- End of Report ---      <                   64-year-old male     h/o  HTN.  HLD,  hypothyroidism,  ex  smoker . quit  a yr  ago            had  2 months ,  abdominal distention, pain, night sweats, and weight loss of 25 pounds.          was  evaluated at [facility]   er  1 month ago, had CT scan and US, told he had benign liver cysts and discharged.        persistent  abd  pain,  his  pcp,   did  an  MRI  2 days ago.. pt  wastold to go to  er   /   denies chest pain, fever, cough, recent travel.       abd  pain,  from  cysts       MRI    8/21/24.   posterior stomach,  heterogenous cyst  9.2 x 6.4cm; upper pelvis heterogenous cyst, just right of midline 6.4 x 4.0 cm              right and trace left pleural effusion. Few cystic/fluid filled structures within the liver measuring 4.4cm.  the lateral side,   21 x 14 cm cystic collection. abutting/adjacent to the stomach,              cystic fluid structure measuring 9x6 cm ,   appears to be heterogenous with debris. In  upper pelvis  there is a cystic fluid structure heterogenous with debris 6x4cm concer  for neoplasm,             house    gi   eval,  need  to  r/o  malignant  process/  ID  eval    r/o infectious  process         pedal  edema/  sob   on exertion,  , for past  month or  so              ?  acute  diastolic    chf.  .  on   iv lasix       HTN/  HLD       Hypothyroid      DM,  follow  fs         ekg.    nsr, low   voltage   /  echo          Ct  chest  angio,  no pe,  hepatic  cysts.  upto  2 2 cm         MRI pending       on  empiric  iv  cefepime.   ID f/p,  / infectious  cause/  cysts       CT a/p.  mlple  hepatic custs. largest  about   20  by  15  cm/  surg  eval         dvt  ppx               rad< from: CT Angio Chest PE Protocol w/ IV Cont (08.23.24 @ 16:11) >  MPRESSION:  No pulmonary embolism.  Right hemidiaphragm elevation with mild atelectasis involving the right   middle lobe and right base.  Trace right pleural effusion.  Partially imaged cystic hepatic lesions with largest measuring up to 22   cm. The exact etiology is unclear and diagnostic considerations include   infection and potentially metastasis. Correlate with outside abdominal   MRI from 2 days ago and consider CT abdomen and pelvis for further   assessment  --- End of Report ---      <

## 2024-08-25 NOTE — PROGRESS NOTE ADULT - SUBJECTIVE AND OBJECTIVE BOX
date of service: 08-25-24 @ 06:38  [facility]  REVIEW OF SYSTEMS:  CONSTITUTIONAL: No fever,  no  weight loss  ENT:  No  tinnitus,   no   vertigo  NECK: No pain or stiffness  RESPIRATORY: No cough, wheezing, chills or hemoptysis;    No Shortness of Breath  CARDIOVASCULAR: No chest pain, palpitations, dizziness  GASTROINTESTINAL: No abdominal or epigastric pain. No nausea, vomiting, or hematemesis; No diarrhea  No melena or hematochezia.  GENITOURINARY: No dysuria, frequency, hematuria, or incontinence  NEUROLOGICAL: No headaches  SKIN: No itching,  no   rash  LYMPH Nodes: No enlarged glands  ENDOCRINE: No heat or cold intolerance  MUSCULOSKELETAL: No joint pain or swelling  PSYCHIATRIC: No depression, anxiety  HEME/LYMPH: No easy bruising, or bleeding gums  ALLERGY AND IMMUNOLOGIC: No hives or eczema	    MEDICATIONS  (STANDING):  atorvastatin 10 milliGRAM(s) Oral at bedtime  cefepime   IVPB 1000 milliGRAM(s) IV Intermittent every 8 hours  dextrose 5%. 1000 milliLiter(s) (100 mL/Hr) IV Continuous <Continuous>  dextrose 5%. 1000 milliLiter(s) (50 mL/Hr) IV Continuous <Continuous>  dextrose 50% Injectable 25 Gram(s) IV Push once  dextrose 50% Injectable 12.5 Gram(s) IV Push once  dextrose 50% Injectable 25 Gram(s) IV Push once  furosemide   Injectable 40 milliGRAM(s) IV Push daily  glucagon  Injectable 1 milliGRAM(s) IntraMuscular once  heparin   Injectable 5000 Unit(s) SubCutaneous every 8 hours  insulin lispro (ADMELOG) corrective regimen sliding scale   SubCutaneous three times a day before meals  LORazepam     Tablet 1 milliGRAM(s) Oral once  senna 2 Tablet(s) Oral at bedtime    MEDICATIONS  (PRN):  dextrose Oral Gel 15 Gram(s) Oral once PRN Blood Glucose LESS THAN 70 milliGRAM(s)/deciliter  HYDROmorphone   Tablet 2 milliGRAM(s) Oral every 6 hours PRN Moderate Pain (4 - 6)  HYDROmorphone  Injectable 0.5 milliGRAM(s) IV Push every 12 hours PRN Severe Pain (7 - 10)      Vital Signs Last 24 Hrs  T(C): 36.6 (25 Aug 2024 04:25), Max: 36.8 (24 Aug 2024 13:26)  T(F): 97.9 (25 Aug 2024 04:25), Max: 98.2 (24 Aug 2024 13:26)  HR: 94 (25 Aug 2024 04:25) (88 - 94)  BP: 114/77 (25 Aug 2024 04:25) (109/76 - 114/77)  BP(mean): --  RR: 18 (25 Aug 2024 04:25) (18 - 18)  SpO2: 94% (25 Aug 2024 04:25) (94% - 98%)    Parameters below as of 25 Aug 2024 04:25  Patient On (Oxygen Delivery Method): room air      CAPILLARY BLOOD GLUCOSE      POCT Blood Glucose.: 164 mg/dL (24 Aug 2024 21:47)  POCT Blood Glucose.: 114 mg/dL (24 Aug 2024 17:22)  POCT Blood Glucose.: 105 mg/dL (24 Aug 2024 12:55)  POCT Blood Glucose.: 156 mg/dL (24 Aug 2024 08:54)    I&O's Summary        Appearance: Normal	  HEENT:   Normal oral mucosa, PERRL, EOMI	  Lymphatic: No lymphadenopathy  Cardiovascular: Normal S1 S2, No JVD  Respiratory: Lungs clear to auscultation	  Gastrointestinal:  Soft, Non-tender, + BS	  Skin: No rash, No ecchymoses	  Extremities:     LABS:                        10.9   8.06  )-----------( 395      ( 24 Aug 2024 06:51 )             34.9     08-24    141  |  101  |  9   ----------------------------<  130<H>  3.1<L>   |  26  |  0.58    Ca    8.6      24 Aug 2024 06:51    TPro  7.4  /  Alb  2.8<L>  /  TBili  0.5  /  DBili  x   /  AST  10  /  ALT  10  /  AlkPhos  146<H>  08-23    PT/INR - ( 23 Aug 2024 11:47 )   PT: 13.6 sec;   INR: 1.24 ratio         PTT - ( 23 Aug 2024 11:47 )  PTT:40.5 sec      Urinalysis Basic - ( 24 Aug 2024 06:51 )    Color: x / Appearance: x / SG: x / pH: x  Gluc: 130 mg/dL / Ketone: x  / Bili: x / Urobili: x   Blood: x / Protein: x / Nitrite: x   Leuk Esterase: x / RBC: x / WBC x   Sq Epi: x / Non Sq Epi: x / Bacteria: x                      Consultant(s) Notes Reviewed:      Care Discussed with Consultants/Other Providers:

## 2024-08-25 NOTE — CONSULT NOTE ADULT - SUBJECTIVE AND OBJECTIVE BOX
General Surgery Consult  Consulting surgical team:  Consulting attending:    HPI:  64-year-old male       with PMHx of hypertension, hypercholesterolemia, hypothyroidism     presenting with 2 months of abdominal distention and pain, night sweats, and weight loss of 25 pounds.       former smoker, quit 1 year ago. ,  had   dysuria intermittent x 1 month.      was  evaluated at [facility] 1 month ago, had CT scan and US, told he had benign liver cysts and discharged.     due  to  persistent  abd  pain,  his  pcp,   did  an  MRI 2 days ago.. pt  was    called last night and told to go to  er  for follow-up testing.     denies chest pain, fever, cough, recent travel.         (23 Aug 2024 13:38)      PAST MEDICAL HISTORY:  Obese    HTN (hypertension)    HLD (hyperlipidemia)    Hypothyroid        PAST SURGICAL HISTORY:  Status post tendon repair        MEDICATIONS:  atorvastatin 10 milliGRAM(s) Oral at bedtime  cefepime   IVPB 1000 milliGRAM(s) IV Intermittent every 8 hours  dextrose 5%. 1000 milliLiter(s) IV Continuous <Continuous>  dextrose 5%. 1000 milliLiter(s) IV Continuous <Continuous>  dextrose 50% Injectable 25 Gram(s) IV Push once  dextrose 50% Injectable 12.5 Gram(s) IV Push once  dextrose 50% Injectable 25 Gram(s) IV Push once  dextrose Oral Gel 15 Gram(s) Oral once PRN  furosemide   Injectable 40 milliGRAM(s) IV Push daily  glucagon  Injectable 1 milliGRAM(s) IntraMuscular once  heparin   Injectable 5000 Unit(s) SubCutaneous every 8 hours  HYDROmorphone   Tablet 2 milliGRAM(s) Oral every 6 hours PRN  HYDROmorphone  Injectable 0.5 milliGRAM(s) IV Push every 12 hours PRN  insulin lispro (ADMELOG) corrective regimen sliding scale   SubCutaneous three times a day before meals  LORazepam     Tablet 1 milliGRAM(s) Oral once  senna 2 Tablet(s) Oral at bedtime      ALLERGIES:  No Known Allergies      VITALS & I/Os:  Vital Signs Last 24 Hrs  T(C): 36.8 (25 Aug 2024 12:27), Max: 36.8 (25 Aug 2024 12:27)  T(F): 98.2 (25 Aug 2024 12:27), Max: 98.2 (25 Aug 2024 12:27)  HR: 92 (25 Aug 2024 12:27) (91 - 94)  BP: 111/76 (25 Aug 2024 12:27) (109/76 - 114/77)  BP(mean): --  RR: 18 (25 Aug 2024 12:27) (18 - 18)  SpO2: 94% (25 Aug 2024 12:27) (94% - 94%)    Parameters below as of 25 Aug 2024 12:27  Patient On (Oxygen Delivery Method): room air        I&O's Summary    24 Aug 2024 07:01  -  25 Aug 2024 07:00  --------------------------------------------------------  IN: 400 mL / OUT: 0 mL / NET: 400 mL        PHYSICAL EXAM:  General: No acute distress  Respiratory: Nonlabored  Cardiovascular: RRR  Abdominal: Soft, nondistended, nontender. No rebound or guarding. No organomegaly, no palpable mass.  Extremities: Warm    LABS:                        10.9   8.21  )-----------( 360      ( 25 Aug 2024 07:10 )             34.5     08-25    140  |  100  |  11  ----------------------------<  143<H>  3.4<L>   |  28  |  0.64    Ca    8.7      25 Aug 2024 07:10      Lactate:              Urinalysis Basic - ( 25 Aug 2024 07:10 )    Color: x / Appearance: x / SG: x / pH: x  Gluc: 143 mg/dL / Ketone: x  / Bili: x / Urobili: x   Blood: x / Protein: x / Nitrite: x   Leuk Esterase: x / RBC: x / WBC x   Sq Epi: x / Non Sq Epi: x / Bacteria: x        IMAGING:                                                                                               General Surgery Consult  Consulting surgical team:  Consulting attending:    HPI:  64M w/ PMH sig for DM, HTN, HLD, hypothyroidism presents with with two months of right sided abdominal pain. Pt reports that early July he developed abdominal pain mainly in the right flank, denies N/V, fevers. Early July of 2024 reports a short period of cold sweats, loss of taste buds and fatigue  but those symptoms resolved. Pt has been trying to loose weight but feels that since the onset of pain his appetite is worse and he lost more than expected (25lb). Pt initially went to [facility] were he had a CT scan which showed "benign liver cysts" and was discharged. However given that abdominal pain persistent his PCP referred him for MRI (images uploaded in our system) which showed large liver cysts and send him to ED. On admission, WBC normal, LFTs wnl except for Alk Phos 146. CEA 1.1, AFP 2. Pt reports remote history of heavy EThOH use mostly socially, stoped for the past 8years. Denies smoking  or any other drugs. No personal family history of cancers, mother had breast CA and father throat CA.         PAST MEDICAL HISTORY:  Obese    HTN (hypertension)    HLD (hyperlipidemia)    Hypothyroid        PAST SURGICAL HISTORY:  Status post tendon repair        MEDICATIONS:  atorvastatin 10 milliGRAM(s) Oral at bedtime  cefepime   IVPB 1000 milliGRAM(s) IV Intermittent every 8 hours  dextrose 5%. 1000 milliLiter(s) IV Continuous <Continuous>  dextrose 5%. 1000 milliLiter(s) IV Continuous <Continuous>  dextrose 50% Injectable 25 Gram(s) IV Push once  dextrose 50% Injectable 12.5 Gram(s) IV Push once  dextrose 50% Injectable 25 Gram(s) IV Push once  dextrose Oral Gel 15 Gram(s) Oral once PRN  furosemide   Injectable 40 milliGRAM(s) IV Push daily  glucagon  Injectable 1 milliGRAM(s) IntraMuscular once  heparin   Injectable 5000 Unit(s) SubCutaneous every 8 hours  HYDROmorphone   Tablet 2 milliGRAM(s) Oral every 6 hours PRN  HYDROmorphone  Injectable 0.5 milliGRAM(s) IV Push every 12 hours PRN  insulin lispro (ADMELOG) corrective regimen sliding scale   SubCutaneous three times a day before meals  LORazepam     Tablet 1 milliGRAM(s) Oral once  senna 2 Tablet(s) Oral at bedtime      ALLERGIES:  No Known Allergies      VITALS & I/Os:  Vital Signs Last 24 Hrs  T(C): 36.8 (25 Aug 2024 12:27), Max: 36.8 (25 Aug 2024 12:27)  T(F): 98.2 (25 Aug 2024 12:27), Max: 98.2 (25 Aug 2024 12:27)  HR: 92 (25 Aug 2024 12:27) (91 - 94)  BP: 111/76 (25 Aug 2024 12:27) (109/76 - 114/77)  BP(mean): --  RR: 18 (25 Aug 2024 12:27) (18 - 18)  SpO2: 94% (25 Aug 2024 12:27) (94% - 94%)    Parameters below as of 25 Aug 2024 12:27  Patient On (Oxygen Delivery Method): room air        I&O's Summary    24 Aug 2024 07:01  -  25 Aug 2024 07:00  --------------------------------------------------------  IN: 400 mL / OUT: 0 mL / NET: 400 mL        PHYSICAL EXAM:  General: No acute distress  Respiratory: Nonlabored  Cardiovascular: RRR  Abdominal: Soft, obese, right flank tenderness. No rebound or guarding.   Extremities: Warm, bl pitting edema +1    LABS:                        10.9   8.21  )-----------( 360      ( 25 Aug 2024 07:10 )             34.5     08-25    140  |  100  |  11  ----------------------------<  143<H>  3.4<L>   |  28  |  0.64    Ca    8.7      25 Aug 2024 07:10      Lactate:              Urinalysis Basic - ( 25 Aug 2024 07:10 )    Color: x / Appearance: x / SG: x / pH: x  Gluc: 143 mg/dL / Ketone: x  / Bili: x / Urobili: x   Blood: x / Protein: x / Nitrite: x   Leuk Esterase: x / RBC: x / WBC x   Sq Epi: x / Non Sq Epi: x / Bacteria: x        IMAGING:    < from: CT Abdomen and Pelvis w/ IV Cont (08.24.24 @ 18:04) >  PROCEDURE:  CT of the Abdomen and Pelvis was performed.  Sagittal and coronal reformats were performed.    FINDINGS:  LOWER CHEST: Trace right pleural effusion. Right lower lobe solid   pulmonary nodule measures 7 mm.  LIVER: Multiple bilobar cystic hypodensities with the largest measuring   20.8 x 15.4 cm in the right hepatic lobe. There is a left anterior cyst   that measures 19.1 x 15.7 cm. There is a left inferior cyst along the   greater curvature of the stomach that measures 7.4 x 5.8 cm with a   pedicle extending into the caudate lobe (series 4, image 70). There are   smaller hypodensities scattered throughout the liver.  BILE DUCTS: Normal caliber.  GALLBLADDER: Within normal limits.  SPLEEN: Within normal limits.  PANCREAS: Within normal limits.  ADRENALS: Within normal limits.  KIDNEYS/URETERS: Within normal limits.    BLADDER: Within normal limits.  REPRODUCTIVE ORGANS: Prostate is enlarged.    BOWEL: No bowel obstruction. Appendix is normal.  PERITONEUM/RETROPERITONEUM: Within normal limits.  VESSELS: There is stenosis of the right portal vein. The hepatic veins   are patent. Atherosclerotic changes.  LYMPH NODES: Multiple prominent subcentimeter periaortic lymph nodes.  ABDOMINAL WALL: Within normal limits.  BONES: Left hip total arthroplasty. Degenerative changes of the spine.    IMPRESSION:  Multiple hepatic bilobar simple cysts as described above.    < end of copied text >

## 2024-08-26 ENCOUNTER — RESULT REVIEW (OUTPATIENT)
Age: 64
End: 2024-08-26

## 2024-08-26 LAB
ALBUMIN SERPL ELPH-MCNC: 2.8 G/DL — LOW (ref 3.3–5)
ALP SERPL-CCNC: 130 U/L — HIGH (ref 40–120)
ALT FLD-CCNC: 10 U/L — SIGNIFICANT CHANGE UP (ref 10–45)
ANION GAP SERPL CALC-SCNC: 14 MMOL/L — SIGNIFICANT CHANGE UP (ref 5–17)
AST SERPL-CCNC: 12 U/L — SIGNIFICANT CHANGE UP (ref 10–40)
BILIRUB DIRECT SERPL-MCNC: 0.2 MG/DL — SIGNIFICANT CHANGE UP (ref 0–0.3)
BILIRUB INDIRECT FLD-MCNC: 0.4 MG/DL — SIGNIFICANT CHANGE UP (ref 0.2–1)
BILIRUB SERPL-MCNC: 0.6 MG/DL — SIGNIFICANT CHANGE UP (ref 0.2–1.2)
BUN SERPL-MCNC: 11 MG/DL — SIGNIFICANT CHANGE UP (ref 7–23)
CALCIUM SERPL-MCNC: 8.6 MG/DL — SIGNIFICANT CHANGE UP (ref 8.4–10.5)
CHLORIDE SERPL-SCNC: 98 MMOL/L — SIGNIFICANT CHANGE UP (ref 96–108)
CO2 SERPL-SCNC: 27 MMOL/L — SIGNIFICANT CHANGE UP (ref 22–31)
CREAT SERPL-MCNC: 0.67 MG/DL — SIGNIFICANT CHANGE UP (ref 0.5–1.3)
EGFR: 104 ML/MIN/1.73M2 — SIGNIFICANT CHANGE UP
GLUCOSE BLDC GLUCOMTR-MCNC: 133 MG/DL — HIGH (ref 70–99)
GLUCOSE BLDC GLUCOMTR-MCNC: 134 MG/DL — HIGH (ref 70–99)
GLUCOSE BLDC GLUCOMTR-MCNC: 155 MG/DL — HIGH (ref 70–99)
GLUCOSE BLDC GLUCOMTR-MCNC: 191 MG/DL — HIGH (ref 70–99)
GLUCOSE SERPL-MCNC: 130 MG/DL — HIGH (ref 70–99)
HAV IGM SER-ACNC: SIGNIFICANT CHANGE UP
HBV CORE IGM SER-ACNC: SIGNIFICANT CHANGE UP
HBV SURFACE AG SER-ACNC: SIGNIFICANT CHANGE UP
HCT VFR BLD CALC: 33.4 % — LOW (ref 39–50)
HCV AB S/CO SERPL IA: 0.13 S/CO — SIGNIFICANT CHANGE UP (ref 0–0.99)
HCV AB SERPL-IMP: SIGNIFICANT CHANGE UP
HGB BLD-MCNC: 10.5 G/DL — LOW (ref 13–17)
MCHC RBC-ENTMCNC: 26.1 PG — LOW (ref 27–34)
MCHC RBC-ENTMCNC: 31.4 GM/DL — LOW (ref 32–36)
MCV RBC AUTO: 82.9 FL — SIGNIFICANT CHANGE UP (ref 80–100)
NRBC # BLD: 0 /100 WBCS — SIGNIFICANT CHANGE UP (ref 0–0)
PLATELET # BLD AUTO: 384 K/UL — SIGNIFICANT CHANGE UP (ref 150–400)
POTASSIUM SERPL-MCNC: 3.5 MMOL/L — SIGNIFICANT CHANGE UP (ref 3.5–5.3)
POTASSIUM SERPL-SCNC: 3.5 MMOL/L — SIGNIFICANT CHANGE UP (ref 3.5–5.3)
PROT SERPL-MCNC: 7.1 G/DL — SIGNIFICANT CHANGE UP (ref 6–8.3)
RBC # BLD: 4.03 M/UL — LOW (ref 4.2–5.8)
RBC # FLD: 15.1 % — HIGH (ref 10.3–14.5)
SODIUM SERPL-SCNC: 139 MMOL/L — SIGNIFICANT CHANGE UP (ref 135–145)
WBC # BLD: 8.66 K/UL — SIGNIFICANT CHANGE UP (ref 3.8–10.5)
WBC # FLD AUTO: 8.66 K/UL — SIGNIFICANT CHANGE UP (ref 3.8–10.5)

## 2024-08-26 PROCEDURE — 99232 SBSQ HOSP IP/OBS MODERATE 35: CPT | Mod: GC

## 2024-08-26 PROCEDURE — 93306 TTE W/DOPPLER COMPLETE: CPT | Mod: 26

## 2024-08-26 PROCEDURE — 99223 1ST HOSP IP/OBS HIGH 75: CPT

## 2024-08-26 PROCEDURE — 99232 SBSQ HOSP IP/OBS MODERATE 35: CPT

## 2024-08-26 RX ORDER — HEPARIN SODIUM,BOVINE 1000/ML
5000 VIAL (ML) INJECTION ONCE
Refills: 0 | Status: COMPLETED | OUTPATIENT
Start: 2024-08-26 | End: 2024-08-26

## 2024-08-26 RX ORDER — FUROSEMIDE 40 MG
20 TABLET ORAL DAILY
Refills: 0 | Status: DISCONTINUED | OUTPATIENT
Start: 2024-08-26 | End: 2024-09-02

## 2024-08-26 RX ADMIN — Medication 20 MILLIGRAM(S): at 13:34

## 2024-08-26 RX ADMIN — Medication 1: at 08:40

## 2024-08-26 RX ADMIN — Medication 40 MILLIGRAM(S): at 06:31

## 2024-08-26 RX ADMIN — Medication 10 MILLIGRAM(S): at 21:50

## 2024-08-26 RX ADMIN — Medication 2 TABLET(S): at 21:50

## 2024-08-26 RX ADMIN — Medication 1: at 17:50

## 2024-08-26 RX ADMIN — Medication 5000 UNIT(S): at 06:31

## 2024-08-26 RX ADMIN — CEFEPIME 100 MILLIGRAM(S): 2 INJECTION, POWDER, FOR SOLUTION INTRAVENOUS at 13:33

## 2024-08-26 RX ADMIN — Medication 5000 UNIT(S): at 13:33

## 2024-08-26 RX ADMIN — Medication 5000 UNIT(S): at 21:50

## 2024-08-26 RX ADMIN — CEFEPIME 100 MILLIGRAM(S): 2 INJECTION, POWDER, FOR SOLUTION INTRAVENOUS at 06:31

## 2024-08-26 NOTE — CONSULT NOTE ADULT - ATTENDING COMMENTS
64-year-old male with PMHx of hypertension, hypercholesterolemia, hypothyroidism presenting with pain abdomen,  weakness, weight loss since July. He started feeling unwell since July 4th.  He had significant weight loss of 25 pounds. Former smoker, quit 1 year ago. He was  evaluated at [facility] 1 month ago, had CT scan and US, told he had benign liver cysts and discharged.Ddue  to  persistent  abd  pain,  his  pcp,   did  an  MRI 2 days ago and pt  was   called last night and told to go to  er  for follow-up testing.     Has been afebrile, NO leukocytosis, preserved renal/liver functions, CRP high 197  Has outpt MRI disc and reports with him - large cystic lesions noted. To get repeat MRI in hospital.  Report placed in chart at nursing station.      # Multiple liver and upper abdominal cyst    - No systemic signs of infection, unclear etiology of the cystic lesions now  - Low concern for parasitic infection  - Continue cefepime for now since he seems stable.    - follow up MRI today  - likely will need drainage/sampling of the lesions; please send specimen for cell counts/differentials, cultures after MRI resulted  - follow up blood cultures in lab   - f/up  entamoeba serology  - f/up quant gold    d/w NP  ID will follow    Rebecca Lee MD  [phone] (pager)  [phone] (office)
Mr. Ferrari is a gentleman who presented with abdominal pain of unclear etiology. Pain is presumed due to large liver cysts.   We are told by pt that the cysts were noted to be much small in [place] when he went to [facility] for acute abdominal pain. He has recent weight loss ~25lbs in the past 2 months and night sweats.    CT Abd/p shows trace right pleural effusion, right lower lobe solid pulmonary nodule measures 7 mm. Multiple bilobar cystic hypodensities with the largest measuring 20.8 x 15.4 cm in the right hepatic lobe. A left anterior cyst that measures 19.1 x 15.7 cm. A left inferior cyst along the greater curvature of the stomach that measures 7.4 x 5.8 cm with a pedicle extending into the caudate lobe (series 4, image 70). And, smaller hypodensities scattered throughout the liver. Multiple prominent subcentimeter periaortic lymph nodes. Otherwise unremarkable.    Liver enzymes normal except for slightly elevated alkaline phos.  Appreciate surgery input.  IR consulted for possible cyst drainage +/- sclerosant treatment.  May have recurrence afterwards. I made pt aware of this.  For his weight loss, may consider further work up - possible EGD & Colonoscopy especially if pain persists despite cyst intervention.    Yaz Cormier MD/MPH   Transplant Hepatology

## 2024-08-26 NOTE — PROGRESS NOTE ADULT - SUBJECTIVE AND OBJECTIVE BOX
64yPatient is a 64y old  Male who presents with a chief complaint of abd  pain (26 Aug 2024 09:31)      Interval history:    Seen an examined.  Feels well. No pain. No fevers.  Had f/up ct scan, multiple large liver cysts noted.      Antimicrobials:    cefepime   IVPB 1000 milliGRAM(s) IV Intermittent every 8 hours      Vital Signs Last 24 Hrs  T(C): 37.4 (08-26-24 @ 14:19), Max: 37.4 (08-26-24 @ 14:19)  T(F): 99.4 (08-26-24 @ 14:19), Max: 99.4 (08-26-24 @ 14:19)  HR: 97 (08-26-24 @ 14:19) (94 - 97)  BP: 114/82 (08-26-24 @ 14:19) (106/76 - 114/82)  BP(mean): --  RR: 18 (08-26-24 @ 14:19) (18 - 18)  SpO2: 94% (08-26-24 @ 14:19) (92% - 94%)        PHYSICAL EXAM:    General: Patient in NAD  HEENT: NCAT, EOMI, PERRL, no oral lesions  CV: S1+S2, no m/r/g appreciated   Lungs: No respiratory distress, CTAB  Abd: Soft, mild tenderness RUQ    : No suprapubic tenderness  Neuro: Alert and oriented to time, place and person. Moves all extremities against gravity.  Ext: No cyanosis, no edema  Skin: No rash, no phlebitis                        10.5   8.66  )-----------( 384      ( 26 Aug 2024 07:18 )             33.4   08-26    139  |  98  |  11  ----------------------------<  130<H>  3.5   |  27  |  0.67    Ca    8.6      26 Aug 2024 07:16    TPro  7.1  /  Alb  2.8<L>  /  TBili  0.6  /  DBili  0.2  /  AST  12  /  ALT  10  /  AlkPhos  130<H>  08-26      LIVER FUNCTIONS - ( 26 Aug 2024 07:16 )  Alb: 2.8 g/dL / Pro: 7.1 g/dL / ALK PHOS: 130 U/L / ALT: 10 U/L / AST: 12 U/L / GGT: x             RECENT CULTURES:    Culture - Blood (collected 24 Aug 2024 06:51)  Source: .Blood Blood-Peripheral  Preliminary Report (26 Aug 2024 11:01):    No growth at 48 Hours          Radiology:  < from: CT Abdomen and Pelvis w/ IV Cont (08.24.24 @ 18:04) >  LOWER CHEST: Trace right pleural effusion. Right lower lobe solid   pulmonary nodule measures 7 mm.  LIVER: Multiple bilobar cystic hypodensities with the largest measuring   20.8 x 15.4 cm in the right hepatic lobe. There is a left anterior cyst   that measures 19.1 x 15.7 cm. There is a left inferior cyst along the   greater curvature of the stomach that measures 7.4 x 5.8 cm with a   pedicle extending into the caudate lobe (series 4, image 70). There are   smaller hypodensities scattered throughout the liver.  BILE DUCTS: Normal caliber.  GALLBLADDER: Within normal limits.  SPLEEN: Within normal limits.  PANCREAS: Within normal limits.  ADRENALS: Within normal limits.  KIDNEYS/URETERS: Within normal limits.    BLADDER: Within normal limits.  REPRODUCTIVE ORGANS: Prostate is enlarged.    BOWEL: No bowel obstruction. Appendix is normal.  PERITONEUM/RETROPERITONEUM: Within normal limits.  VESSELS: There is stenosis of the right portal vein. The hepatic veins   are patent. Atherosclerotic changes.  LYMPH NODES: Multiple prominent subcentimeter periaortic lymph nodes.  ABDOMINAL WALL: Within normal limits.  BONES: Left hip total arthroplasty. Degenerative changes of the spine.    IMPRESSION:  Multiple hepatic bilobar simple cysts as described above.    < end of copied text >

## 2024-08-26 NOTE — CONSULT NOTE ADULT - ASSESSMENT
64-year-old male with PMHx of hypertension, hypercholesterolemia, hypothyroidism presenting with pain abdomen,  weakness, weight loss since July found to have multiple heptic cysts on CT, afebrile, with generally preserved hepatic function consulted for further workup and management.     #Liver cysts   *CT abd/pelvis: Multiple bilobar cystic hypodensities with the largest measuring 20.8 x 15.4 cm in the right hepatic lobe. There is a left anterior cyst that measures 19.1 x 15.7 cm. There is a left inferior cyst along the greater curvature of the stomach that measures 7.4 x 5.8 cm with a pedicle extending into the caudate lobe (series 4, image 70). There are smaller hypodensities scattered throughout the liver.  * Liver enzymes wnl; platelets wnl (395), INR slightly elevated (1.24)       Pending discussion with attending.  64-year-old male with PMHx of hypertension, hypercholesterolemia, hypothyroidism presenting with pain abdomen,  weakness, weight loss since July found to have multiple heptic cysts on CT, afebrile, with generally preserved hepatic function consulted for further workup and management.     #Liver cysts   *CT abd/pelvis: Multiple bilobar cystic hypodensities with the largest measuring 20.8 x 15.4 cm in the right hepatic lobe. There is a left anterior cyst that measures 19.1 x 15.7 cm. There is a left inferior cyst along the greater curvature of the stomach that measures 7.4 x 5.8 cm with a pedicle extending into the caudate lobe (series 4, image 70). There are smaller hypodensities scattered throughout the liver.  * Liver enzymes wnl; platelets wnl (395), INR slightly elevated (1.24)   Abd pain unlikley from the cysts but will continue with workup. Unlikely infectious. Recommend full workup to determine cause of abdominal pain and weight loss    Recommendations:   - Pre-albumin level   - Abdominal ultrasound with doppler   - c/w to monitor, trend CMP, CBC   - f/u with hepatology outpatient     Pending discussion with attending.  64-year-old male with PMHx of hypertension, hypercholesterolemia, hypothyroidism presenting with pain abdomen,  weakness, weight loss since July found to have multiple heptic cysts on CT, afebrile, with generally preserved hepatic function consulted for further workup and management. Liver cysts consistent with polycystic liver disease. Patient is without current signs of synthetic liver dysfunction or portal hypertension.     #Liver cysts   *CT abd/pelvis: Multiple bilobar cystic hypodensities with the largest measuring 20.8 x 15.4 cm in the right hepatic lobe. There is a left anterior cyst that measures 19.1 x 15.7 cm. There is a left inferior cyst along the greater curvature of the stomach that measures 7.4 x 5.8 cm with a pedicle extending into the caudate lobe (series 4, image 70). There are smaller hypodensities scattered throughout the liver.  * Liver enzymes wnl; platelets wnl (395), INR slightly elevated (1.24)   Abd pain unlikely from the cysts but will continue with workup. Unlikely infectious. Recommend full workup to determine cause of abdominal pain and weight loss    Recommendations:   - Pre-albumin level.  - Abdominal ultrasound with doppler.  - OK to obtain IR consultation for drainage if patient is having significant discomfort, however, do not need this for confirmation of polycystic liver disease.   - c/w to monitor, trend CMP, CBC   - f/u with hepatology outpatient       Note incomplete until finalized by attending signature/attestation.    Aleyda Lucero MD  Hepatology Resident    MONDAY-FRIDAY 8AM-5PM:  Please message via [employer] or email [email or web address]   On Weekends/Holidays (All day) and Weekdays after 5 PM to 8 AM  For nonurgent consults please email:  Please email [email or web address]    URGENT CONSULTS:  Please contact on call GI team. See Amion schedule ([facility]), [employer] paging system ([facility]), or call hospital  ([facility])

## 2024-08-26 NOTE — PROGRESS NOTE ADULT - SUBJECTIVE AND OBJECTIVE BOX
SURGERY DAILY PROGRESS NOTE    24 Hour/Overnight Events: No acute events     SUBJECTIVE: pt with some abd pain, otherwise no complaints    ------------------------------------------------------------------------------------------------------------  OBJECTIVE:  Vital Signs Last 24 Hrs  T(C): 36.8 (25 Aug 2024 20:49), Max: 36.8 (25 Aug 2024 12:27)  T(F): 98.3 (25 Aug 2024 20:49), Max: 98.3 (25 Aug 2024 20:49)  HR: 95 (25 Aug 2024 20:49) (92 - 95)  BP: 110/82 (25 Aug 2024 20:49) (110/82 - 114/77)  BP(mean): --  RR: 18 (25 Aug 2024 20:49) (18 - 18)  SpO2: 94% (25 Aug 2024 20:49) (94% - 94%)    Parameters below as of 25 Aug 2024 20:49  Patient On (Oxygen Delivery Method): room air      I&O's Detail    24 Aug 2024 07:01  -  25 Aug 2024 07:00  --------------------------------------------------------  IN:    IV PiggyBack: 100 mL    Oral Fluid: 300 mL  Total IN: 400 mL  OUT:  Total OUT: 0 mL  Total NET: 400 mL    25 Aug 2024 07:01  -  26 Aug 2024 02:27  --------------------------------------------------------  IN:    Oral Fluid: 240 mL  Total IN: 240 mL  OUT:  Total OUT: 0 mL  Total NET: 240 mL      Physical exam:  General: NAD   Respiratory: no increased WOB  Abdominal: Soft, nondistended, nontender. No rebound or guarding.   Extremities: WWP SURGERY DAILY PROGRESS NOTE    24 Hour/Overnight Events: No acute events     SUBJECTIVE: Seen and examined, lucia abd pain this AM.    ------------------------------------------------------------------------------------------------------------  OBJECTIVE:  Vital Signs Last 24 Hrs  T(C): 36.8 (25 Aug 2024 20:49), Max: 36.8 (25 Aug 2024 12:27)  T(F): 98.3 (25 Aug 2024 20:49), Max: 98.3 (25 Aug 2024 20:49)  HR: 95 (25 Aug 2024 20:49) (92 - 95)  BP: 110/82 (25 Aug 2024 20:49) (110/82 - 114/77)  BP(mean): --  RR: 18 (25 Aug 2024 20:49) (18 - 18)  SpO2: 94% (25 Aug 2024 20:49) (94% - 94%)    Parameters below as of 25 Aug 2024 20:49  Patient On (Oxygen Delivery Method): room air      I&O's Detail    24 Aug 2024 07:01  -  25 Aug 2024 07:00  --------------------------------------------------------  IN:    IV PiggyBack: 100 mL    Oral Fluid: 300 mL  Total IN: 400 mL  OUT:  Total OUT: 0 mL  Total NET: 400 mL    25 Aug 2024 07:01  -  26 Aug 2024 02:27  --------------------------------------------------------  IN:    Oral Fluid: 240 mL  Total IN: 240 mL  OUT:  Total OUT: 0 mL  Total NET: 240 mL      Physical exam:  General: NAD   Respiratory: no increased WOB  Abdominal: Soft, obese abdomen, mild tenderness in R abdomen  Extremities: WWP SURGERY DAILY PROGRESS NOTE    24 Hour/Overnight Events: No acute events     SUBJECTIVE: Seen and examined, mild abd pain this AM.    ------------------------------------------------------------------------------------------------------------  OBJECTIVE:  Vital Signs Last 24 Hrs  T(C): 36.8 (25 Aug 2024 20:49), Max: 36.8 (25 Aug 2024 12:27)  T(F): 98.3 (25 Aug 2024 20:49), Max: 98.3 (25 Aug 2024 20:49)  HR: 95 (25 Aug 2024 20:49) (92 - 95)  BP: 110/82 (25 Aug 2024 20:49) (110/82 - 114/77)  BP(mean): --  RR: 18 (25 Aug 2024 20:49) (18 - 18)  SpO2: 94% (25 Aug 2024 20:49) (94% - 94%)    Parameters below as of 25 Aug 2024 20:49  Patient On (Oxygen Delivery Method): room air      I&O's Detail    24 Aug 2024 07:01  -  25 Aug 2024 07:00  --------------------------------------------------------  IN:    IV PiggyBack: 100 mL    Oral Fluid: 300 mL  Total IN: 400 mL  OUT:  Total OUT: 0 mL  Total NET: 400 mL    25 Aug 2024 07:01  -  26 Aug 2024 02:27  --------------------------------------------------------  IN:    Oral Fluid: 240 mL  Total IN: 240 mL  OUT:  Total OUT: 0 mL  Total NET: 240 mL      Physical exam:  General: NAD   Respiratory: no increased WOB  Abdominal: Soft, obese abdomen, mild tenderness in R abdomen  Extremities: WWP

## 2024-08-26 NOTE — PROGRESS NOTE ADULT - SUBJECTIVE AND OBJECTIVE BOX
Date of Service: 08-26-24 @ 07:14           CARDIOLOGY     PROGRESS  NOTE   ________________________________________________    CHIEF COMPLAINT:Patient is a 64y old  Male who presents with a chief complaint of abd  pain (26 Aug 2024 02:26)  doing better, decrease edema  	  REVIEW OF SYSTEMS:  CONSTITUTIONAL: No fever, weight loss, or fatigue  EYES: No eye pain, visual disturbances, or discharge  ENT:  No difficulty hearing, tinnitus, vertigo; No sinus or throat pain  NECK: No pain or stiffness  RESPIRATORY: No cough, wheezing, chills or hemoptysis; + decrease  Shortness of Breath  CARDIOVASCULAR: No chest pain, palpitations, passing out, dizziness, or leg swelling  GASTROINTESTINAL: No abdominal or epigastric pain. No nausea, vomiting, or hematemesis; No diarrhea or constipation. No melena or hematochezia.  GENITOURINARY: No dysuria, frequency, hematuria, or incontinence  NEUROLOGICAL: No headaches, memory loss, loss of strength, numbness, or tremors  SKIN: No itching, burning, rashes, or lesions   LYMPH Nodes: No enlarged glands  ENDOCRINE: No heat or cold intolerance; No hair loss  MUSCULOSKELETAL: No joint pain or swelling; No muscle, back, or extremity pain  PSYCHIATRIC: No depression, anxiety, mood swings, or difficulty sleeping  HEME/LYMPH: No easy bruising, or bleeding gums  ALLERGY AND IMMUNOLOGIC: No hives or eczema	    [x ] All others negative	  [ ] Unable to obtain    PHYSICAL EXAM:  T(C): 36.9 (08-26-24 @ 04:45), Max: 36.9 (08-26-24 @ 04:45)  HR: 94 (08-26-24 @ 04:45) (92 - 95)  BP: 106/76 (08-26-24 @ 04:45) (106/76 - 111/76)  RR: 18 (08-26-24 @ 04:45) (18 - 18)  SpO2: 92% (08-26-24 @ 04:45) (92% - 94%)  Wt(kg): --  I&O's Summary    25 Aug 2024 07:01  -  26 Aug 2024 07:00  --------------------------------------------------------  IN: 240 mL / OUT: 0 mL / NET: 240 mL        Appearance: Normal	  HEENT:   Normal oral mucosa, PERRL, EOMI	  Lymphatic: No lymphadenopathy  Cardiovascular: Normal S1 S2, No JVD, + murmurs, decrease  edema  Respiratory: rhonchi	  Psychiatry: A & O x 3, Mood & affect appropriate  Gastrointestinal:  Soft, Non-tender, + BS	  Skin: No rashes, No ecchymoses, No cyanosis	  Neurologic: Non-focal  Extremities: Normal range of motion, No clubbing, cyanosis , decrease  edema  Vascular: Peripheral pulses palpable 2+ bilaterally    MEDICATIONS  (STANDING):  atorvastatin 10 milliGRAM(s) Oral at bedtime  cefepime   IVPB 1000 milliGRAM(s) IV Intermittent every 8 hours  dextrose 5%. 1000 milliLiter(s) (50 mL/Hr) IV Continuous <Continuous>  dextrose 5%. 1000 milliLiter(s) (100 mL/Hr) IV Continuous <Continuous>  dextrose 50% Injectable 25 Gram(s) IV Push once  dextrose 50% Injectable 12.5 Gram(s) IV Push once  dextrose 50% Injectable 25 Gram(s) IV Push once  furosemide   Injectable 40 milliGRAM(s) IV Push daily  glucagon  Injectable 1 milliGRAM(s) IntraMuscular once  heparin   Injectable 5000 Unit(s) SubCutaneous every 8 hours  insulin lispro (ADMELOG) corrective regimen sliding scale   SubCutaneous three times a day before meals  LORazepam     Tablet 1 milliGRAM(s) Oral once  senna 2 Tablet(s) Oral at bedtime      TELEMETRY: 	    ECG:  	  RADIOLOGY:  OTHER: 	  	  LABS:	 	    CARDIAC MARKERS:                          10.9   8.21  )-----------( 360      ( 25 Aug 2024 07:10 )             34.5     08-25    140  |  100  |  11  ----------------------------<  143<H>  3.4<L>   |  28  |  0.64    Ca    8.7      25 Aug 2024 07:10      proBNP:   Lipid Profile:   HgA1c:   TSH:       < from: CT Abdomen and Pelvis w/ IV Cont (08.24.24 @ 18:04) >  FINDINGS:  LOWER CHEST: Trace right pleural effusion. Right lower lobe solid   pulmonary nodule measures 7 mm.  LIVER: Multiple bilobar cystic hypodensities with the largest measuring   20.8 x 15.4 cm in the right hepatic lobe. There is a left anterior cyst   that measures 19.1 x 15.7 cm. There is a left inferior cyst along the   greater curvature of the stomach that measures 7.4 x 5.8 cm with a   pedicle extending into the caudate lobe (series 4, image 70). There are   smaller hypodensities scattered throughout the liver.  BILE DUCTS: Normal caliber.  GALLBLADDER: Within normal limits.  SPLEEN: Within normal limits.  PANCREAS: Within normal limits.  ADRENALS: Within normal limits.  KIDNEYS/URETERS: Within normal limits.    BLADDER: Within normal limits.  REPRODUCTIVE ORGANS: Prostate is enlarged.    BOWEL: No bowel obstruction. Appendix is normal.  PERITONEUM/RETROPERITONEUM: Within normal limits.  VESSELS: There is stenosis of the right portal vein. The hepatic veins   are patent. Atherosclerotic changes.  LYMPH NODES: Multiple prominent subcentimeter periaortic lymph nodes.  ABDOMINAL WALL: Within normal limits.  BONES: Left hip total arthroplasty. Degenerative changes of the spine.    IMPRESSION:  Multiple hepatic bilobar simple cysts as described above.        Assessment and plan  ---------------------------  64-year-old male with PMHx of hypertension, hypercholesterolemia, hypothyroidism presenting with 2 months of abdominal distention and pain, night sweats, and weight loss of 25 pounds.  Endorses SOB with trouble taking deep breaths, improving. Patient is a former smoker, quit 1 year ago. + constipation, + dysuria intermittent x 1 month.  Last bowel movement 2 days ago, wnl. + flatulance.  Patient evaluated at [facility] 1 month ago, had CT scan and US, told he had benign liver cysts and discharged. When abd discomfort  did not improve pt followed up with his PCP who sent him for an MRI 2 days ago.  Patient called last night and told to go to ED for follow-up testing. Results were not discussed with him. Denies chest pain, fever, cough, recent travel.  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 Denies chronic alcohol use. Pt did at home H pylori test last week, came back positive.  pt with hx of htn, dm with hepatic mass with  increasing sob and LE  edema  no cardiac dawn done before  awaiting repeat ecg, noted NSR  echo  tsh  lipid panel  will adjust bp meds  decrease lasix  dvt prophylaxis  awaiting  MRI of the abdomen and pelvis  awaiting TTE  replete Lytes  ct scan noted, hepatic cyst  decrease lasix to 20 mg daily, awaiting echo  still on abx

## 2024-08-26 NOTE — CONSULT NOTE ADULT - SUBJECTIVE AND OBJECTIVE BOX
Interventional Radiology      HPI: 64y Male with PMHx of HTN, HLD, hypothyroidism presenting with 2 months of abdominal distention and pain, night sweats, and weight loss of 25 pounds. CT with hepatic and extrahepatic cysts. IR consulted for drainage.       Allergies: No Known Allergies    Medications (Abx/Cardiac/Anticoagulation/Blood Products)  cefepime   IVPB: 100 mL/Hr IV Intermittent (08-26 @ 13:33)  furosemide    Tablet: 20 milliGRAM(s) Oral (08-26 @ 13:34)  furosemide   Injectable: 40 milliGRAM(s) IV Push (08-26 @ 06:31)  heparin   Injectable: 5000 Unit(s) SubCutaneous (08-26 @ 13:33)    Home Medications  acetaminophen 325 mg oral tablet: 2 tab(s) orally every 6 hours  amlodipine-benazepril 10 mg-20 mg oral capsule: 1 cap(s) orally once a day  ascorbic acid 500 mg oral tablet: 1 tab(s) orally 2 times a day  aspirin 81 mg oral tablet: 1 tab(s) orally once a day  Aspirin Enteric Coated 81 mg oral delayed release tablet: 1 tab(s) orally 2 times a day MDD: 2  celecoxib 200 mg oral capsule: 1 cap(s) orally every 12 hours  cyclobenzaprine 10 mg oral tablet: 1 tab(s) orally 3 times a day MDD: 3  gabapentin 300 mg oral capsule: 1 cap(s) orally every 12 hours for two weeks MDD: 2  levothyroxine 50 mcg (0.05 mg) oral tablet: 1 tab(s) orally once a day  levothyroxine 50 mcg (0.05 mg) oral tablet: 1 orally once a day  Lotrel 10 mg-20 mg oral capsule: 1 orally once a day  metFORMIN 500 mg oral tablet: 1 orally 2 times a day  metFORMIN 500 mg oral tablet: 1 tab(s) orally 2 times a day  Multiple Vitamins oral tablet: 1 tab(s) orally once a day  Narcan 4 mg/0.1 mL nasal spray: 4 milligram(s) intranasally once , repeat as necessary.   As needed. For suspected opiate overdose   Follow instructions on packet MDD: 0.2 ml  pantoprazole 40 mg oral delayed release tablet: 1 tab(s) orally once a day (before a meal) MDD: 1  polyethylene glycol 3350 oral powder for reconstitution: 17 gram(s) orally once a day (at bedtime)  simvastatin 40 mg oral tablet: 1 tab(s) orally once a day  simvastatin 40 mg oral tablet: 1 orally once a day  tobramycin 0.3% ophthalmic solution: 1 drop(s) to each affected eye every 4 hours    Data:  T(C): 37.4  HR: 97  BP: 114/82  RR: 18  SpO2: 94%    -WBC 8.66 / HgB 10.5 / Hct 33.4 / Plt 384  -Na 139 / Cl 98 / BUN 11 / Glucose 130  -K 3.5 / CO2 27 / Cr 0.67  -ALT 10 / Alk Phos 130 / T.Bili 0.6  -INR 1.24 / PTT 40.5    Radiology: reviewed    Assessment/Plan:   64y Male with PMHx of HTN, HLD, hypothyroidism presenting with 2 months of abdominal distention and pain, night sweats, and weight loss of 25 pounds. CT with hepatic and extrahepatic cysts. IR consulted for drainage.     -          Kathrine Bowers MD   Interventional Radiology     Contact on Microsoft Teams for nonemergent issues    - Nonemergent consults:  [facility] order "Consult- Interventional Radiology", no page required  - Emergent issues (pager): [facility] [phone]; [facility] [phone]; [phone]; DO NOT PAGE FOR SCHEDULING QUESTIONS  - Scheduling questions 8am-6pm : [facility] [phone]; [facility] [phone],   - Clinic/outpatient booking: [facility] [phone]; [facility] [phone]  Interventional Radiology      HPI: 64y Male with PMHx of HTN, HLD, hypothyroidism presenting with 2 months of abdominal distention and pain, night sweats, and weight loss of 25 pounds. CT with hepatic and extrahepatic cysts. IR consulted for drainage.       Allergies: No Known Allergies    Medications (Abx/Cardiac/Anticoagulation/Blood Products)  cefepime   IVPB: 100 mL/Hr IV Intermittent (08-26 @ 13:33)  furosemide    Tablet: 20 milliGRAM(s) Oral (08-26 @ 13:34)  furosemide   Injectable: 40 milliGRAM(s) IV Push (08-26 @ 06:31)  heparin   Injectable: 5000 Unit(s) SubCutaneous (08-26 @ 13:33)    Home Medications  acetaminophen 325 mg oral tablet: 2 tab(s) orally every 6 hours  amlodipine-benazepril 10 mg-20 mg oral capsule: 1 cap(s) orally once a day  ascorbic acid 500 mg oral tablet: 1 tab(s) orally 2 times a day  aspirin 81 mg oral tablet: 1 tab(s) orally once a day  Aspirin Enteric Coated 81 mg oral delayed release tablet: 1 tab(s) orally 2 times a day MDD: 2  celecoxib 200 mg oral capsule: 1 cap(s) orally every 12 hours  cyclobenzaprine 10 mg oral tablet: 1 tab(s) orally 3 times a day MDD: 3  gabapentin 300 mg oral capsule: 1 cap(s) orally every 12 hours for two weeks MDD: 2  levothyroxine 50 mcg (0.05 mg) oral tablet: 1 tab(s) orally once a day  levothyroxine 50 mcg (0.05 mg) oral tablet: 1 orally once a day  Lotrel 10 mg-20 mg oral capsule: 1 orally once a day  metFORMIN 500 mg oral tablet: 1 orally 2 times a day  metFORMIN 500 mg oral tablet: 1 tab(s) orally 2 times a day  Multiple Vitamins oral tablet: 1 tab(s) orally once a day  Narcan 4 mg/0.1 mL nasal spray: 4 milligram(s) intranasally once , repeat as necessary.   As needed. For suspected opiate overdose   Follow instructions on packet MDD: 0.2 ml  pantoprazole 40 mg oral delayed release tablet: 1 tab(s) orally once a day (before a meal) MDD: 1  polyethylene glycol 3350 oral powder for reconstitution: 17 gram(s) orally once a day (at bedtime)  simvastatin 40 mg oral tablet: 1 tab(s) orally once a day  simvastatin 40 mg oral tablet: 1 orally once a day  tobramycin 0.3% ophthalmic solution: 1 drop(s) to each affected eye every 4 hours    Data:  T(C): 37.4  HR: 97  BP: 114/82  RR: 18  SpO2: 94%    -WBC 8.66 / HgB 10.5 / Hct 33.4 / Plt 384  -Na 139 / Cl 98 / BUN 11 / Glucose 130  -K 3.5 / CO2 27 / Cr 0.67  -ALT 10 / Alk Phos 130 / T.Bili 0.6  -INR 1.24 / PTT 40.5    Radiology: reviewed    Assessment/Plan:   64y Male with PMHx of HTN, HLD, hypothyroidism presenting with 2 months of abdominal distention and pain, night sweats, and weight loss of 25 pounds. CT with hepatic and extrahepatic cysts. IR consulted for drainage.     Case discussed with Dr. Caputo. Appreciate input from surgery for final plan regarding which cysts to target. IR will continue to follow.      Kathrine Bowers MD   Interventional Radiology     Contact on Microsoft Teams for nonemergent issues    - Nonemergent consults:  [facility] order "Consult- Interventional Radiology", no page required  - Emergent issues (pager): [facility] [phone]; [facility] -[phone]; [phone]; DO NOT PAGE FOR SCHEDULING QUESTIONS  - Scheduling questions 8am-6pm : [facility] [phone]; [facility] -[phone],   - Clinic/outpatient booking: [facility] [phone]; [facility] -[phone]

## 2024-08-26 NOTE — PROGRESS NOTE ADULT - ASSESSMENT
64-year-old male with PMHx of hypertension, hypercholesterolemia, hypothyroidism presenting with pain abdomen,  weakness, weight loss since July. He started feeling unwell since July 4th.  He had significant weight loss of 25 pounds. Former smoker, quit 1 year ago. He was  evaluated at [facility] 1 month ago, had CT scan and US, told he had benign liver cysts and discharged.  Due  to  persistent  abd  pain,  his  pcp,   did  an  MRI and pt  was   called last night and told to go to  er  for follow-up testing.     Has been afebrile, NO leukocytosis, preserved renal/liver functions, CRP high 197  Has outpt MRI disc and reports with him - large cystic lesions noted.   Report placed in chart at nursing station.  CT repeated and reveals large cysts.    # Multiple liver and upper abdominal cyst    - No systemic signs of infection. Cysts do not appear infected. ?cause of wt loss, doubt from cysts.  - Low concern for parasitic infection  - Can STOP cefepime  now  - If to get cyst drained by IR- please check cultures - fungal, afb, bacterial.  - follow up hepatology/surgery eval  - follow up blood culture in lab   - f/up  entamoeba serology  - f/up quant gold  - f/up hepatitis panel    D/w NP    Rebecca Lee MD  [phone] (pager)  [phone] (office) .

## 2024-08-26 NOTE — PROGRESS NOTE ADULT - ASSESSMENT
A 65yo male with hx of HTN, hypercholesteralemia, hypothyroidism with 2 months of abd distension, pain, night sweats, and weight loss with multiple hepatic bilobar simple cysts shown on imaging.     Plan:  - case to be discussed with attending and plan updated A 63yo male with hx of HTN, hypercholesteralemia, hypothyroidism with 2 months of abd distension, pain, night sweats, and weight loss with multiple hepatic bilobar simple cysts shown on imaging.     Plan:  - Attending to review images, further plans pending  - Please complete liver work up (hepatic panel) given hx of heavy EthOH use   - Agree with the primary team to continue cardiac work up   - Rest of care per primary    [facility] [phone] A 65yo male with hx of HTN, hypercholesteralemia, hypothyroidism with 2 months of abd distension, pain, night sweats, and weight loss with multiple hepatic bilobar simple cysts shown on imaging.     **Addendum @1:06PM  - Pls obtain IR consult for drainage of extrahepatic cysts    Plan:  - Attending to review images, further plans pending  - Please complete liver work up (hepatic panel) given hx of heavy EthOH use   - Agree with the primary team to continue cardiac work up   - Rest of care per primary    [facility] [phone]

## 2024-08-26 NOTE — PROGRESS NOTE ADULT - SUBJECTIVE AND OBJECTIVE BOX
date of service: 08-26-24 @ 08:12  afberile  REVIEW OF SYSTEMS:  CONSTITUTIONAL: No fever,  no  weight loss  ENT:  No  tinnitus,   no   vertigo  NECK: No pain or stiffness  RESPIRATORY: No cough, wheezing, chills or hemoptysis;    No Shortness of Breath  CARDIOVASCULAR: No chest pain, palpitations, dizziness  GASTROINTESTINAL: No abdominal or epigastric pain. No nausea, vomiting, or hematemesis; No diarrhea  No melena or hematochezia.  GENITOURINARY: No dysuria, frequency, hematuria, or incontinence  NEUROLOGICAL: No headaches  SKIN: No itching,  no   rash  LYMPH Nodes: No enlarged glands  ENDOCRINE: No heat or cold intolerance  MUSCULOSKELETAL: No joint pain or swelling  PSYCHIATRIC: No depression, anxiety  HEME/LYMPH: No easy bruising, or bleeding gums  ALLERGY AND IMMUNOLOGIC: No hives or eczema	    MEDICATIONS  (STANDING):  atorvastatin 10 milliGRAM(s) Oral at bedtime  cefepime   IVPB 1000 milliGRAM(s) IV Intermittent every 8 hours  dextrose 5%. 1000 milliLiter(s) (50 mL/Hr) IV Continuous <Continuous>  dextrose 5%. 1000 milliLiter(s) (100 mL/Hr) IV Continuous <Continuous>  dextrose 50% Injectable 25 Gram(s) IV Push once  dextrose 50% Injectable 12.5 Gram(s) IV Push once  dextrose 50% Injectable 25 Gram(s) IV Push once  furosemide    Tablet 20 milliGRAM(s) Oral daily  glucagon  Injectable 1 milliGRAM(s) IntraMuscular once  heparin   Injectable 5000 Unit(s) SubCutaneous every 8 hours  insulin lispro (ADMELOG) corrective regimen sliding scale   SubCutaneous three times a day before meals  LORazepam     Tablet 1 milliGRAM(s) Oral once  senna 2 Tablet(s) Oral at bedtime    MEDICATIONS  (PRN):  dextrose Oral Gel 15 Gram(s) Oral once PRN Blood Glucose LESS THAN 70 milliGRAM(s)/deciliter  HYDROmorphone   Tablet 2 milliGRAM(s) Oral every 6 hours PRN Moderate Pain (4 - 6)  HYDROmorphone  Injectable 0.5 milliGRAM(s) IV Push every 12 hours PRN Severe Pain (7 - 10)      Vital Signs Last 24 Hrs  T(C): 36.9 (26 Aug 2024 04:45), Max: 36.9 (26 Aug 2024 04:45)  T(F): 98.4 (26 Aug 2024 04:45), Max: 98.4 (26 Aug 2024 04:45)  HR: 94 (26 Aug 2024 04:45) (92 - 95)  BP: 106/76 (26 Aug 2024 04:45) (106/76 - 111/76)  BP(mean): --  RR: 18 (26 Aug 2024 04:45) (18 - 18)  SpO2: 92% (26 Aug 2024 04:45) (92% - 94%)    Parameters below as of 26 Aug 2024 04:45  Patient On (Oxygen Delivery Method): room air      CAPILLARY BLOOD GLUCOSE      POCT Blood Glucose.: 152 mg/dL (25 Aug 2024 21:27)  POCT Blood Glucose.: 117 mg/dL (25 Aug 2024 17:17)  POCT Blood Glucose.: 135 mg/dL (25 Aug 2024 12:10)  POCT Blood Glucose.: 152 mg/dL (25 Aug 2024 08:34)    I&O's Summary    25 Aug 2024 07:01  -  26 Aug 2024 07:00  --------------------------------------------------------  IN: 640 mL / OUT: 0 mL / NET: 640 mL          Appearance: Normal	  HEENT:   Normal oral mucosa, PERRL, EOMI	  Lymphatic: No lymphadenopathy  Cardiovascular: Normal S1 S2, No JVD  Respiratory: Lungs clear to auscultation	  Gastrointestinal:  Soft, Non-tender, + BS	  Skin: No rash, No ecchymoses	  Extremities:     LABS:                        10.5   8.66  )-----------( 384      ( 26 Aug 2024 07:18 )             33.4     08-25    140  |  100  |  11  ----------------------------<  143<H>  3.4<L>   |  28  |  0.64    Ca    8.7      25 Aug 2024 07:10            Urinalysis Basic - ( 25 Aug 2024 07:10 )    Color: x / Appearance: x / SG: x / pH: x  Gluc: 143 mg/dL / Ketone: x  / Bili: x / Urobili: x   Blood: x / Protein: x / Nitrite: x   Leuk Esterase: x / RBC: x / WBC x   Sq Epi: x / Non Sq Epi: x / Bacteria: x                      Consultant(s) Notes Reviewed:      Care Discussed with Consultants/Other Providers:

## 2024-08-26 NOTE — CHART NOTE - NSCHARTNOTEFT_GEN_A_CORE
IR consult for drainage of extrahepatic cyst ordered as d/w Dr. Suarez -ID and Hepatologist Dr. Lucero. Cefepime discontinued as recommended  with ID and Dr. Rosa.

## 2024-08-26 NOTE — CHART NOTE - NSCHARTNOTEFT_GEN_A_CORE
64 yrs old male w/ hx of HTN, HLD, and hypothyroidism who presented w/ abd pain and new finding of multiple hepatic cysts along with heterogenous cyst posterior to the stomach. [facility] consulted for possible drainage. Per report OSH, MRI abd/pelvis on 8/22 showed small right and trace left pleural effusion. Few cystic/fluid filled structures within the liver measuring 4.4cm. On the lateral side of the liver, there is a 21 x 14 cm cystic collection. Abutting/adjacent to the stomach, there is a cystic fluid structure measuring 9x6 cm which appears to be heterogenous with debris. In the upper pelvis there is a cystic fluid structure which is heterogenous with debris 6x4cm. Findings concerning for neoplasm, or inflammatory. Now patient underwent CT A/P which showed multiple large hepatic cysts but no evidence of cystic collection in the RP. Requested hepatology to follow up with the patient, concerned for possible polycystic liver disease. [facility] to sign off, please contact us if you have any questions or concerns.     Giancarlo Caballero, PGY-6  Gastroenterology/Hepatology Fellow  Available on [employer] Teams  [phone] (Short Range Pager)  [phone] (Long Range Pager)    After 5pm, please contact the on-call GI fellow.

## 2024-08-26 NOTE — PROGRESS NOTE ADULT - ASSESSMENT
64-year-old male     h/o  HTN.  HLD,  hypothyroidism,  ex  smoker . quit  a yr  ago            had  2 months ,  abdominal distention, pain, night sweats, and weight loss of 25 pounds.          was  evaluated at [place]   [facility]  1 month ago, had CT scan and US, told he had benign liver cysts and discharged.        persistent  abd  pain,  his  pcp,   did  an  MRI  2 days ago.. pt  wastold to go to  er   /   denies chest pain, fever, cough, recent travel.       abd  pain,  from  cysts       MRI    8/21/24.   posterior stomach,  heterogenous cyst  9.2 x 6.4cm; upper pelvis heterogenous cyst, just right of midline 6.4 x 4.0 cm              right and trace left pleural effusion. Few cystic/fluid filled structures within the liver measuring 4.4cm.  the lateral side,   21 x 14 cm cystic collection. abutting/adjacent to the stomach,              cystic fluid structure measuring 9x6 cm ,   appears to be heterogenous with debris. In  upper pelvis  there is a cystic fluid structure heterogenous with debris 6x4cm concer  for neoplasm,             house    gi   eval,  need  to  r/o  malignant  process/  ID  eval    r/o infectious  process         pedal  edema/  sob   on exertion,  , for past  month or  so              ?  acute  diastolic    chf.  .  on   iv lasix       HTN/  HLD       Hypothyroid      DM,  follow  fs    Anemia         ekg.    nsr, low   voltage   /  echo          Ct  chest  angio,  no pe,  hepatic  cysts.  upto  2 2 cm         MRI pending        stopped cefepime. .  bcx,  negative         CT a/p.  mlple  hepatic custs. largest  about   20  by  15  cm/    awaiting   plan  from surgery     dvt  ppx               rad< from: CT Angio Chest PE Protocol w/ IV Cont (08.23.24 @ 16:11) >  MPRESSION:  No pulmonary embolism.  Right hemidiaphragm elevation with mild atelectasis involving the right   middle lobe and right base.  Trace right pleural effusion.  Partially imaged cystic hepatic lesions with largest measuring up to 22   cm. The exact etiology is unclear and diagnostic considerations include   infection and potentially metastasis. Correlate with outside abdominal   MRI from 2 days ago and consider CT abdomen and pelvis for further   assessment  --- End of Report ---      <

## 2024-08-26 NOTE — CONSULT NOTE ADULT - SUBJECTIVE AND OBJECTIVE BOX
HPI:  Mr. Ferrari is a 64 yrs old male w/ hx of HTN, HLD, and hypothyroidism who presented w/ abd pain for the past one month. Reported he had o/p MRI A/P completed on 8/21 which showed multiple cysts and he was told to come to the ED. Patient reported prior to July, he was feeling normal but last month, started to notice abd pain that was lower abd radiating to her RUQ region. Also had abd distension with bloating. Developed progressive constipation but has been tolerating PO diet, no nausea or vomiting. Denied fevers and chills. Also complained of 20 lbs weight loss in the last one month. Denied fam hx of pancreatic or liver or GB cancer. On admission, VSS, labs showed elevated ALP, but normal bilirubin and liver enzymes. Advanced GI consulted for multiple hepatic cysts and heterogenous cysts posterior to the stomach.     Allergies:  No Known Allergies      Home Medications:    Hospital Medications:  atorvastatin 10 milliGRAM(s) Oral at bedtime  cefepime   IVPB 1000 milliGRAM(s) IV Intermittent every 8 hours  dextrose 5%. 1000 milliLiter(s) IV Continuous <Continuous>  dextrose 5%. 1000 milliLiter(s) IV Continuous <Continuous>  dextrose 50% Injectable 25 Gram(s) IV Push once  dextrose 50% Injectable 12.5 Gram(s) IV Push once  dextrose 50% Injectable 25 Gram(s) IV Push once  dextrose Oral Gel 15 Gram(s) Oral once PRN  furosemide    Tablet 20 milliGRAM(s) Oral daily  glucagon  Injectable 1 milliGRAM(s) IntraMuscular once  heparin   Injectable 5000 Unit(s) SubCutaneous every 8 hours  HYDROmorphone   Tablet 2 milliGRAM(s) Oral every 6 hours PRN  HYDROmorphone  Injectable 0.5 milliGRAM(s) IV Push every 12 hours PRN  insulin lispro (ADMELOG) corrective regimen sliding scale   SubCutaneous three times a day before meals  LORazepam     Tablet 1 milliGRAM(s) Oral once  senna 2 Tablet(s) Oral at bedtime      PMHX/PSHX:  Obese  HTN (hypertension)  HLD (hyperlipidemia)  Hypothyroid  Status post tendon repair    Family history:  Family history of breast cancer in mother (Mother); Family history of throat cancer (Father)  Denies family history of colon cancer/polyps, stomach cancer/polyps, pancreatic cancer/masses, liver cancer/disease, ovarian cancer and endometrial cancer.    Social History:   Tob: Denies  EtOH: Denies  Illicit Drugs: Denies    ROS:   General:  No wt loss, fevers, chills, night sweats, fatigue  Eyes:  Good vision, no reported pain  ENT:  No sore throat, pain, runny nose, dysphagia  CV:  No pain, palpitations, hypo/hypertension  Pulm:  No dyspnea, cough, tachypnea, wheezing  GI:  see HPI  :  No pain, bleeding, incontinence, nocturia  Muscle:  No pain, weakness  Neuro:  No weakness, tingling, memory problems  Psych:  No fatigue, insomnia, mood problems, depression  Endocrine:  No polyuria, polydipsia, cold/heat intolerance  Heme:  No petechiae, ecchymosis, easy bruisability  Skin:  No rash, tattoos, scars, edema    PHYSICAL EXAM:     GENERAL:  No acute distress  HEENT:  NCAT, no scleral icterus   CHEST:  no respiratory distress  HEART:  Regular rate and rhythm  ABDOMEN:  Soft, severely distended, mild lower abd tenderness, no rebound or guarding.   EXTREMITIES: No edema  SKIN:  No rash/erythema/ecchymoses/petechiae/wounds/abscess/warm/dry  NEURO:  Alert and oriented x 3, no asterixis    Vital Signs:  Vital Signs Last 24 Hrs  T(C): 36.9 (26 Aug 2024 04:45), Max: 36.9 (26 Aug 2024 04:45)  T(F): 98.4 (26 Aug 2024 04:45), Max: 98.4 (26 Aug 2024 04:45)  HR: 94 (26 Aug 2024 04:45) (92 - 95)  BP: 106/76 (26 Aug 2024 04:45) (106/76 - 111/76)  BP(mean): --  RR: 18 (26 Aug 2024 04:45) (18 - 18)  SpO2: 92% (26 Aug 2024 04:45) (92% - 94%)    Parameters below as of 26 Aug 2024 04:45  Patient On (Oxygen Delivery Method): room air      Daily   LABS:                        10.5   8.66  )-----------( 384      ( 26 Aug 2024 07:18 )             33.4     Mean Cell Volume: 82.9 fl (08-26-24 @ 07:18)    08-26    139  |  98  |  11  ----------------------------<  130<H>  3.5   |  27  |  0.67    Ca    8.6      26 Aug 2024 07:16    TPro  7.1  /  Alb  2.8<L>  /  TBili  0.6  /  DBili  0.2  /  AST  12  /  ALT  10  /  AlkPhos  130<H>  08-26    LIVER FUNCTIONS - ( 26 Aug 2024 07:16 )  Alb: 2.8 g/dL / Pro: 7.1 g/dL / ALK PHOS: 130 U/L / ALT: 10 U/L / AST: 12 U/L / GGT: x             Urinalysis Basic - ( 26 Aug 2024 07:16 )    Color: x / Appearance: x / SG: x / pH: x  Gluc: 130 mg/dL / Ketone: x  / Bili: x / Urobili: x   Blood: x / Protein: x / Nitrite: x   Leuk Esterase: x / RBC: x / WBC x   Sq Epi: x / Non Sq Epi: x / Bacteria: x                              10.5   8.66  )-----------( 384      ( 26 Aug 2024 07:18 )             33.4                         10.9   8.21  )-----------( 360      ( 25 Aug 2024 07:10 )             34.5                         10.9   8.06  )-----------( 395      ( 24 Aug 2024 06:51 )             34.9                         11.0   8.89  )-----------( 358      ( 23 Aug 2024 11:47 )             35.2       Imaging:  < from: CT Abdomen and Pelvis w/ IV Cont (08.24.24 @ 18:04) >  FINDINGS:  LOWER CHEST: Trace right pleural effusion. Right lower lobe solid   pulmonary nodule measures 7 mm.  LIVER: Multiple bilobar cystic hypodensities with the largest measuring   20.8 x 15.4 cm in the right hepatic lobe. There is a left anterior cyst   that measures 19.1 x 15.7 cm. There is a left inferior cyst along the   greater curvature of the stomach that measures 7.4 x 5.8 cm with a   pedicle extending into the caudate lobe (series 4, image 70). There are   smaller hypodensities scattered throughout the liver.  BILE DUCTS: Normal caliber.  GALLBLADDER: Within normal limits.  SPLEEN: Within normal limits.  PANCREAS: Within normal limits.  ADRENALS: Within normal limits.  KIDNEYS/URETERS: Within normal limits.    BLADDER: Within normal limits.  REPRODUCTIVE ORGANS: Prostate is enlarged.    BOWEL: No bowel obstruction. Appendix is normal.  PERITONEUM/RETROPERITONEUM: Within normal limits.  VESSELS: There is stenosis of the right portal vein. The hepatic veins   are patent. Atherosclerotic changes.  LYMPH NODES: Multiple prominent subcentimeter periaortic lymph nodes.  ABDOMINAL WALL: Within normal limits.  BONES: Left hip total arthroplasty. Degenerative changes of the spine.    IMPRESSION:  Multiple hepatic bilobar simple cysts as described above.    < end of copied text >             HPI:  Mr. Ferrari is a 64 yrs old male w/ hx of HTN, HLD, and hypothyroidism who presented w/ abd pain for the past one month. Reported he had o/p MRI A/P completed on 8/21 which showed multiple cysts and he was told to come to the ED. Patient reported prior to July, he was feeling normal but last month, started to notice abd pain that was lower abd radiating to her RUQ region. Also had abd distension with bloating. Developed progressive constipation but has been tolerating PO diet, no nausea or vomiting. Denied fevers and chills. Also complained of 20 lbs weight loss in the last one month. Denied fam hx of pancreatic or liver or GB cancer. On admission, VSS, labs showed elevated ALP, but normal bilirubin and liver enzymes. Advanced GI consulted for multiple hepatic cysts and heterogenous cysts posterior to the stomach.     Allergies:  No Known Allergies      Home Medications:    Hospital Medications:  atorvastatin 10 milliGRAM(s) Oral at bedtime  cefepime   IVPB 1000 milliGRAM(s) IV Intermittent every 8 hours  dextrose 5%. 1000 milliLiter(s) IV Continuous <Continuous>  dextrose 5%. 1000 milliLiter(s) IV Continuous <Continuous>  dextrose 50% Injectable 25 Gram(s) IV Push once  dextrose 50% Injectable 12.5 Gram(s) IV Push once  dextrose 50% Injectable 25 Gram(s) IV Push once  dextrose Oral Gel 15 Gram(s) Oral once PRN  furosemide    Tablet 20 milliGRAM(s) Oral daily  glucagon  Injectable 1 milliGRAM(s) IntraMuscular once  heparin   Injectable 5000 Unit(s) SubCutaneous every 8 hours  HYDROmorphone   Tablet 2 milliGRAM(s) Oral every 6 hours PRN  HYDROmorphone  Injectable 0.5 milliGRAM(s) IV Push every 12 hours PRN  insulin lispro (ADMELOG) corrective regimen sliding scale   SubCutaneous three times a day before meals  LORazepam     Tablet 1 milliGRAM(s) Oral once  senna 2 Tablet(s) Oral at bedtime      PMHX/PSHX:  Obese  HTN (hypertension)  HLD (hyperlipidemia)  Hypothyroid  Status post tendon repair    Family history:  Family history of breast cancer in mother (Mother); Family history of throat cancer (Father)  Denies family history of colon cancer/polyps, stomach cancer/polyps, pancreatic cancer/masses, liver cancer/disease, ovarian cancer and endometrial cancer.    Social History:   Tob: Denies current use. Pt smoked for ~40 years and quit 1 year ago. Pt previously smoked 1/2 ppd.  EtOH: Pt endorses drinking socially. Drinks 1-2 month, 30 pack of beer.   Illicit Drugs: Denies    ROS:   General:  +wt loss, no fevers, chills, +night sweats, + fatigue  Eyes:  Good vision, no reported pain  ENT:  No sore throat, pain, runny nose, dysphagia  CV:  No pain, palpitations, hypo/hypertension  Pulm:  No dyspnea, cough, tachypnea, wheezing  GI:  see HPI  :  No pain, bleeding, incontinence, nocturia  Muscle:  No pain, weakness  Neuro:  No weakness, tingling, memory problems  Psych:  No fatigue, insomnia, mood problems, depression  Endocrine:  No polyuria, polydipsia, cold/heat intolerance  Heme:  No petechiae, ecchymosis, easy bruisability  Skin:  No rash, tattoos, scars, edema    PHYSICAL EXAM:     GENERAL:  No acute distress  HEENT:  NCAT, no scleral icterus   CHEST:  no respiratory distress  HEART:  Regular rate and rhythm  ABDOMEN:  Soft, severely distended, mild lower abd tenderness, no rebound or guarding.   EXTREMITIES: b/l edema  SKIN:  No rash/erythema/ecchymoses/petechiae/wounds/abscess/warm/dry. bruising on L side of umbilicus from procedure.   NEURO:  Alert and oriented x 3, no asterixis    Vital Signs:  Vital Signs Last 24 Hrs  T(C): 36.9 (26 Aug 2024 04:45), Max: 36.9 (26 Aug 2024 04:45)  T(F): 98.4 (26 Aug 2024 04:45), Max: 98.4 (26 Aug 2024 04:45)  HR: 94 (26 Aug 2024 04:45) (92 - 95)  BP: 106/76 (26 Aug 2024 04:45) (106/76 - 111/76)  BP(mean): --  RR: 18 (26 Aug 2024 04:45) (18 - 18)  SpO2: 92% (26 Aug 2024 04:45) (92% - 94%)    Parameters below as of 26 Aug 2024 04:45  Patient On (Oxygen Delivery Method): room air      Daily   LABS:                        10.5   8.66  )-----------( 384      ( 26 Aug 2024 07:18 )             33.4     Mean Cell Volume: 82.9 fl (08-26-24 @ 07:18)    08-26    139  |  98  |  11  ----------------------------<  130<H>  3.5   |  27  |  0.67    Ca    8.6      26 Aug 2024 07:16    TPro  7.1  /  Alb  2.8<L>  /  TBili  0.6  /  DBili  0.2  /  AST  12  /  ALT  10  /  AlkPhos  130<H>  08-26    LIVER FUNCTIONS - ( 26 Aug 2024 07:16 )  Alb: 2.8 g/dL / Pro: 7.1 g/dL / ALK PHOS: 130 U/L / ALT: 10 U/L / AST: 12 U/L / GGT: x             Urinalysis Basic - ( 26 Aug 2024 07:16 )    Color: x / Appearance: x / SG: x / pH: x  Gluc: 130 mg/dL / Ketone: x  / Bili: x / Urobili: x   Blood: x / Protein: x / Nitrite: x   Leuk Esterase: x / RBC: x / WBC x   Sq Epi: x / Non Sq Epi: x / Bacteria: x                              10.5   8.66  )-----------( 384      ( 26 Aug 2024 07:18 )             33.4                         10.9   8.21  )-----------( 360      ( 25 Aug 2024 07:10 )             34.5                         10.9   8.06  )-----------( 395      ( 24 Aug 2024 06:51 )             34.9                         11.0   8.89  )-----------( 358      ( 23 Aug 2024 11:47 )             35.2       Imaging:  < from: CT Abdomen and Pelvis w/ IV Cont (08.24.24 @ 18:04) >  FINDINGS:  LOWER CHEST: Trace right pleural effusion. Right lower lobe solid   pulmonary nodule measures 7 mm.  LIVER: Multiple bilobar cystic hypodensities with the largest measuring   20.8 x 15.4 cm in the right hepatic lobe. There is a left anterior cyst   that measures 19.1 x 15.7 cm. There is a left inferior cyst along the   greater curvature of the stomach that measures 7.4 x 5.8 cm with a   pedicle extending into the caudate lobe (series 4, image 70). There are   smaller hypodensities scattered throughout the liver.  BILE DUCTS: Normal caliber.  GALLBLADDER: Within normal limits.  SPLEEN: Within normal limits.  PANCREAS: Within normal limits.  ADRENALS: Within normal limits.  KIDNEYS/URETERS: Within normal limits.    BLADDER: Within normal limits.  REPRODUCTIVE ORGANS: Prostate is enlarged.    BOWEL: No bowel obstruction. Appendix is normal.  PERITONEUM/RETROPERITONEUM: Within normal limits.  VESSELS: There is stenosis of the right portal vein. The hepatic veins   are patent. Atherosclerotic changes.  LYMPH NODES: Multiple prominent subcentimeter periaortic lymph nodes.  ABDOMINAL WALL: Within normal limits.  BONES: Left hip total arthroplasty. Degenerative changes of the spine.    IMPRESSION:  Multiple hepatic bilobar simple cysts as described above.    < end of copied text >

## 2024-08-27 ENCOUNTER — TRANSCRIPTION ENCOUNTER (OUTPATIENT)
Age: 64
End: 2024-08-27

## 2024-08-27 LAB
ALBUMIN SERPL ELPH-MCNC: 3 G/DL — LOW (ref 3.3–5)
ALP SERPL-CCNC: 136 U/L — HIGH (ref 40–120)
ALT FLD-CCNC: 10 U/L — SIGNIFICANT CHANGE UP (ref 10–45)
ANION GAP SERPL CALC-SCNC: 14 MMOL/L — SIGNIFICANT CHANGE UP (ref 5–17)
APTT BLD: 36.6 SEC — HIGH (ref 24.5–35.6)
AST SERPL-CCNC: 16 U/L — SIGNIFICANT CHANGE UP (ref 10–40)
BILIRUB SERPL-MCNC: 0.7 MG/DL — SIGNIFICANT CHANGE UP (ref 0.2–1.2)
BLD GP AB SCN SERPL QL: NEGATIVE — SIGNIFICANT CHANGE UP
BUN SERPL-MCNC: 11 MG/DL — SIGNIFICANT CHANGE UP (ref 7–23)
CALCIUM SERPL-MCNC: 8.9 MG/DL — SIGNIFICANT CHANGE UP (ref 8.4–10.5)
CHLORIDE SERPL-SCNC: 95 MMOL/L — LOW (ref 96–108)
CO2 SERPL-SCNC: 27 MMOL/L — SIGNIFICANT CHANGE UP (ref 22–31)
CREAT SERPL-MCNC: 0.67 MG/DL — SIGNIFICANT CHANGE UP (ref 0.5–1.3)
EGFR: 104 ML/MIN/1.73M2 — SIGNIFICANT CHANGE UP
GAMMA INTERFERON BACKGROUND BLD IA-ACNC: 0.06 IU/ML — SIGNIFICANT CHANGE UP
GLUCOSE BLDC GLUCOMTR-MCNC: 123 MG/DL — HIGH (ref 70–99)
GLUCOSE BLDC GLUCOMTR-MCNC: 124 MG/DL — HIGH (ref 70–99)
GLUCOSE BLDC GLUCOMTR-MCNC: 138 MG/DL — HIGH (ref 70–99)
GLUCOSE BLDC GLUCOMTR-MCNC: 160 MG/DL — HIGH (ref 70–99)
GLUCOSE SERPL-MCNC: 125 MG/DL — HIGH (ref 70–99)
HCT VFR BLD CALC: 35 % — LOW (ref 39–50)
HGB BLD-MCNC: 11.1 G/DL — LOW (ref 13–17)
INR BLD: 1.33 RATIO — HIGH (ref 0.85–1.18)
M TB IFN-G BLD-IMP: NEGATIVE — SIGNIFICANT CHANGE UP
M TB IFN-G CD4+ BCKGRND COR BLD-ACNC: 0.01 IU/ML — SIGNIFICANT CHANGE UP
M TB IFN-G CD4+CD8+ BCKGRND COR BLD-ACNC: 0 IU/ML — SIGNIFICANT CHANGE UP
MCHC RBC-ENTMCNC: 26.6 PG — LOW (ref 27–34)
MCHC RBC-ENTMCNC: 31.7 GM/DL — LOW (ref 32–36)
MCV RBC AUTO: 83.7 FL — SIGNIFICANT CHANGE UP (ref 80–100)
NRBC # BLD: 0 /100 WBCS — SIGNIFICANT CHANGE UP (ref 0–0)
PLATELET # BLD AUTO: 439 K/UL — HIGH (ref 150–400)
POTASSIUM SERPL-MCNC: 3.4 MMOL/L — LOW (ref 3.5–5.3)
POTASSIUM SERPL-SCNC: 3.4 MMOL/L — LOW (ref 3.5–5.3)
PROT SERPL-MCNC: 7.7 G/DL — SIGNIFICANT CHANGE UP (ref 6–8.3)
PROTHROM AB SERPL-ACNC: 13.9 SEC — HIGH (ref 9.5–13)
QUANT TB PLUS MITOGEN MINUS NIL: 4.71 IU/ML — SIGNIFICANT CHANGE UP
RBC # BLD: 4.18 M/UL — LOW (ref 4.2–5.8)
RBC # FLD: 15.2 % — HIGH (ref 10.3–14.5)
RH IG SCN BLD-IMP: POSITIVE — SIGNIFICANT CHANGE UP
SODIUM SERPL-SCNC: 136 MMOL/L — SIGNIFICANT CHANGE UP (ref 135–145)
WBC # BLD: 9.52 K/UL — SIGNIFICANT CHANGE UP (ref 3.8–10.5)
WBC # FLD AUTO: 9.52 K/UL — SIGNIFICANT CHANGE UP (ref 3.8–10.5)

## 2024-08-27 PROCEDURE — 49405 IMAGE CATH FLUID COLXN VISC: CPT

## 2024-08-27 PROCEDURE — 88305 TISSUE EXAM BY PATHOLOGIST: CPT | Mod: 26

## 2024-08-27 PROCEDURE — 99232 SBSQ HOSP IP/OBS MODERATE 35: CPT

## 2024-08-27 PROCEDURE — 88112 CYTOPATH CELL ENHANCE TECH: CPT | Mod: 26

## 2024-08-27 RX ORDER — POTASSIUM CHLORIDE 10 MEQ
20 TABLET, EXT RELEASE, PARTICLES/CRYSTALS ORAL ONCE
Refills: 0 | Status: COMPLETED | OUTPATIENT
Start: 2024-08-27 | End: 2024-08-27

## 2024-08-27 RX ADMIN — Medication 20 MILLIEQUIVALENT(S): at 17:33

## 2024-08-27 RX ADMIN — Medication 1: at 17:33

## 2024-08-27 RX ADMIN — HYDROMORPHONE HYDROCHLORIDE 2 MILLIGRAM(S): 2 TABLET ORAL at 22:52

## 2024-08-27 RX ADMIN — Medication 10 MILLIGRAM(S): at 21:50

## 2024-08-27 RX ADMIN — Medication 2 TABLET(S): at 21:50

## 2024-08-27 RX ADMIN — Medication 20 MILLIGRAM(S): at 05:45

## 2024-08-27 RX ADMIN — HYDROMORPHONE HYDROCHLORIDE 2 MILLIGRAM(S): 2 TABLET ORAL at 21:52

## 2024-08-27 NOTE — DISCHARGE NOTE PROVIDER - NSDCFUSCHEDAPPT_GEN_ALL_CORE_FT
Sebastien Buenrostro  [facility]  Scheduled Appointment: 09/11/2024    [facility]  [address] OP Comm [address]  Scheduled Appointment: 09/11/2024    Gagandeep Gaston  [facility]  SURGON[facility] [address] [facility]  Scheduled Appointment: 09/17/2024

## 2024-08-27 NOTE — DISCHARGE NOTE PROVIDER - NSDCCPCAREPLAN_GEN_ALL_CORE_FT
PRINCIPAL DISCHARGE DIAGNOSIS  Diagnosis: Hepatic abscess  Assessment and Plan of Treatment:      PRINCIPAL DISCHARGE DIAGNOSIS  Diagnosis: Hepatic abscess  Assessment and Plan of Treatment: You came into the hospital with weight loss and feeling unwell. We found that you were growing collections of infected fluids in and around your liver. We drained the sites, and placed collection drains along with administering antibiotics. We think we have properly treated you and are discharging you with antibiotics through 9/25/24. Please take these antibiotics as prescribed and please see the Infectious Disease doctor in 3 weeks. Please also see the Interventional Radiology doctors once leaving the hospital as it is vital to receive medical care for your drains.   If you develop dizziness, weakness, chest pain, abdominal pain, have more than usual drain output, nausea or vomiting, fevers, please go to the ED.  Hepatic Drain Flushing instructions:  Turn the three-way stopcock off to the drainage bag.  Clean the flushing port with alcohol and attach the flush syringe.  Gently inject 5cc via flush.  Turn the stopcock off to the flushing port and open to the bag.  Please empty drain every morning and evening.

## 2024-08-27 NOTE — DISCHARGE NOTE PROVIDER - PROVIDER TOKENS
PROVIDER:[[id number]],SCHEDULEDAPPT:[09/05/2024],SCHEDULEDAPPTTIME:[02:00 PM],ESTABLISHEDPATIENT:[T]] PROVIDER:[[id number]],SCHEDULEDAPPT:[09/19/2024],SCHEDULEDAPPTTIME:[02:00 PM],ESTABLISHEDPATIENT:[T]] PROVIDER:[[id number]],SCHEDULEDAPPT:[09/19/2024],SCHEDULEDAPPTTIME:[02:00 PM],ESTABLISHEDPATIENT:[T]],PROVIDER:[TOKEN:[[id number]]] PROVIDER:[[id number]],SCHEDULEDAPPT:[09/19/2024],SCHEDULEDAPPTTIME:[02:00 PM],ESTABLISHEDPATIENT:[T]],PROVIDER:[TOKEN:[[id number]]],PROVIDER:[[id number]],FOLLOWUP:[2 weeks]]

## 2024-08-27 NOTE — CHART NOTE - NSCHARTNOTEFT_GEN_A_CORE
SURGERY POST OP CHECK    STATUS POST PROCEDURE: CT guided Hepatic cyst drainage with IR    SUBJECTIVE: Patient reports mild R flank pain around the R drain, but otherwise denies pain and feels well. Denies fevers/chills, n/v.     --------------------------------------------------------------------------------------------------------------------------------------------------------------------------------------------------------------  OBJECTIVE:  Vital Signs Last 24 Hrs  T(C): 36.6 (27 Aug 2024 21:00), Max: 36.6 (27 Aug 2024 13:06)  T(F): 97.8 (27 Aug 2024 21:00), Max: 97.8 (27 Aug 2024 13:06)  HR: 90 (27 Aug 2024 21:00) (90 - 96)  BP: 109/73 (27 Aug 2024 21:00) (109/70 - 141/77)  BP(mean): 88 (27 Aug 2024 16:40) (79 - 98)  RR: 18 (27 Aug 2024 21:00) (16 - 27)  SpO2: 94% (27 Aug 2024 21:00) (94% - 100%)    Parameters below as of 27 Aug 2024 21:00  Patient On (Oxygen Delivery Method): room air      I&O's Summary    26 Aug 2024 07:01  -  27 Aug 2024 07:00  --------------------------------------------------------  IN: 240 mL / OUT: 0 mL / NET: 240 mL    27 Aug 2024 07:01  -  27 Aug 2024 23:23  --------------------------------------------------------  IN: 240 mL / OUT: 2700 mL / NET: -2460 mL        PHYSICAL EXAM:  Constitutional: NAD, sitting up in bed  Chest: no increased WOB  Abdomen: Soft, mild TTP over R flank drain incision but otherwise nontender, nondistended. No rebound or guarding. R flank drain with dark yellow output, dressing CDI. Midline drain with dark yellow output, dressing CDI.   Extremities: WWP  ----------------------------------------------------------------------------------------------------------------------------------------------------------------------------------------------------------------  ASSESSMENT:   A 65yo male with hx of HTN, hypercholesteralemia, hypothyroidism with 2 months of abd distension, pain, night sweats, and weight loss with multiple hepatic bilobar simple cysts shown on imaging. Now s/p CT guided drainage over hepatic cysts with IR on 8/27/24.     PLAN:  - follow up cytology, gram stain, cultures from OR  - F/u drain output volume and color  - Remainder of care per primary team

## 2024-08-27 NOTE — DISCHARGE NOTE PROVIDER - NSDCHHNEEDSERVICE_GEN_ALL_CORE
Observation and assessment/Other, specify... Observation and assessment/Wound care and assessment/Other, specify...

## 2024-08-27 NOTE — PROGRESS NOTE ADULT - SUBJECTIVE AND OBJECTIVE BOX
64yPatient is a 64y old  Male who presents with a chief complaint of abd  pain (26 Aug 2024 09:31)      Interval history:    Seen and examined.  Feels well. No pain. No fevers.  Had f/up ct scan, multiple large liver cysts noted.  For IR aspiration today      Antimicrobials:  S/p cefepime (stopped 8/26)  OFF       Vital Signs Last 24 Hrs  T(F): 97.8 (08-27-24 @ 13:21), Max: 98.7 (08-26-24 @ 21:20)  HR: 90 (08-27-24 @ 13:25)  BP: 114/68 (08-27-24 @ 13:25)  RR: 18 (08-27-24 @ 13:25)  SpO2: 94% (08-27-24 @ 13:25) (92% - 100%)  Wt(kg): --      PHYSICAL EXAM:    General: Patient in NAD  HEENT: NCAT, EOMI, PERRL, no oral lesions  CV: S1+S2, no m/r/g appreciated   Lungs: No respiratory distress, CTAB  Abd: Soft, mild tenderness RUQ    : No suprapubic tenderness  Neuro: Alert and oriented to time, place and person. Moves all extremities against gravity.  Ext: No cyanosis, no edema  Skin: No rash, no phlebitis                          11.1   9.52  )-----------( 439      ( 27 Aug 2024 07:24 )             35.0 08-27    136  |  95  |  11  ----------------------------<  125  3.4   |  27  |  0.67  Ca    8.9      27 Aug 2024 07:24  TPro  7.7  /  Alb  3.0  /  TBili  0.7  /  DBili  x   /  AST  16  /  ALT  10  /  AlkPhos  136  08-27                        10.5   8.66  )-----------( 384      ( 26 Aug 2024 07:18 )             33.4   08-26    139  |  98  |  11  ----------------------------<  130<H>  3.5   |  27  |  0.67    Ca    8.6      26 Aug 2024 07:16    TPro  7.1  /  Alb  2.8<L>  /  TBili  0.6  /  DBili  0.2  /  AST  12  /  ALT  10  /  AlkPhos  130<H>  08-26      LIVER FUNCTIONS - ( 26 Aug 2024 07:16 )  Alb: 2.8 g/dL / Pro: 7.1 g/dL / ALK PHOS: 130 U/L / ALT: 10 U/L / AST: 12 U/L / GGT: x             RECENT CULTURES:    Culture - Blood (collected 24 Aug 2024 06:51)  Source: .Blood Blood-Peripheral  Preliminary Report (26 Aug 2024 11:01):    No growth at 72 Hours          Radiology:  < from: CT Abdomen and Pelvis w/ IV Cont (08.24.24 @ 18:04) >  LOWER CHEST: Trace right pleural effusion. Right lower lobe solid   pulmonary nodule measures 7 mm.  LIVER: Multiple bilobar cystic hypodensities with the largest measuring   20.8 x 15.4 cm in the right hepatic lobe. There is a left anterior cyst   that measures 19.1 x 15.7 cm. There is a left inferior cyst along the   greater curvature of the stomach that measures 7.4 x 5.8 cm with a   pedicle extending into the caudate lobe (series 4, image 70). There are   smaller hypodensities scattered throughout the liver.  BILE DUCTS: Normal caliber.  GALLBLADDER: Within normal limits.  SPLEEN: Within normal limits.  PANCREAS: Within normal limits.  ADRENALS: Within normal limits.  KIDNEYS/URETERS: Within normal limits.    BLADDER: Within normal limits.  REPRODUCTIVE ORGANS: Prostate is enlarged.    BOWEL: No bowel obstruction. Appendix is normal.  PERITONEUM/RETROPERITONEUM: Within normal limits.  VESSELS: There is stenosis of the right portal vein. The hepatic veins   are patent. Atherosclerotic changes.  LYMPH NODES: Multiple prominent subcentimeter periaortic lymph nodes.  ABDOMINAL WALL: Within normal limits.  BONES: Left hip total arthroplasty. Degenerative changes of the spine.    IMPRESSION:  Multiple hepatic bilobar simple cysts as described above.    < end of copied text >

## 2024-08-27 NOTE — DISCHARGE NOTE PROVIDER - NSDCFUADDINST_GEN_ALL_CORE_FT
Hepatic Drain Flushing instructions:  Turn the three-way stopcock off to the drainage bag.  Clean the flushing port with alcohol and attach the flush syringe.  Gently inject 5cc via flush.  Turn the stopcock off to the flushing port and open to the bag.    Please empty drain every morning and evening.

## 2024-08-27 NOTE — PROGRESS NOTE ADULT - ASSESSMENT
64-year-old male     h/o  HTN.  HLD,  hypothyroidism,  ex  smoker . quit  a yr  ago            had  2 months ,  abdominal distention, pain, night sweats, and weight loss of 25 pounds.          was  evaluated at [facility]   er  1 month ago, had CT scan and US, told he had benign liver cysts and discharged.        persistent  abd  pain,  his  pcp,   did  an  MRI  2 days ago.. pt  wastold to go to  er   /   denies chest pain, fever, cough, recent travel.       abd  pain,  from  cysts       MRI    8/21/24.   posterior stomach,  heterogenous cyst  9.2 x 6.4cm; upper pelvis heterogenous cyst, just right of midline 6.4 x 4.0 cm              right and trace left pleural effusion. Few cystic/fluid filled structures within the liver measuring 4.4cm.  the lateral side,   21 x 14 cm cystic collection. abutting/adjacent to the stomach,              cystic fluid structure measuring 9x6 cm ,   appears to be heterogenous with debris. In  upper pelvis  there is a cystic fluid structure heterogenous with debris 6x4cm concer  for neoplasm,             house    gi   eval,  need  to  r/o  malignant  process/  ID  eval    r/o infectious  process         pedal  edema/  sob   on exertion,  , for past  month or  so              ?  acute  diastolic    chf.  .  on   iv lasix       HTN/  HLD       Hypothyroid      DM,  follow  fs    Anemia         ekg.    nsr, low   voltage   /  echo          Ct  chest  angio,  no pe,  hepatic  cysts.  upto  2 2 cm         MRI pending        stopped cefepime. .  bcx,  negative         CT a/p.  mlple  hepatic custs. largest  about   20  by  15  cm/      per  surg/  needs  cyst dariange   by  IR     dvt  ppx               rad< from: CT Angio Chest PE Protocol w/ IV Cont (08.23.24 @ 16:11) >  MPRESSION:  No pulmonary embolism.  Right hemidiaphragm elevation with mild atelectasis involving the right   middle lobe and right base.  Trace right pleural effusion.  Partially imaged cystic hepatic lesions with largest measuring up to 22   cm. The exact etiology is unclear and diagnostic considerations include   infection and potentially metastasis. Correlate with outside abdominal   MRI from 2 days ago and consider CT abdomen and pelvis for further   assessment  --- End of Report ---      <

## 2024-08-27 NOTE — PROGRESS NOTE ADULT - SUBJECTIVE AND OBJECTIVE BOX
date of serviceafberile: 08-27-24 @ 08:33    REVIEW OF SYSTEMS:  CONSTITUTIONAL: No fever,  no  weight loss  ENT:  No  tinnitus,   no   vertigo  NECK: No pain or stiffness  RESPIRATORY: No cough, wheezing, chills or hemoptysis;    No Shortness of Breath  CARDIOVASCULAR: No chest pain, palpitations, dizziness  GASTROINTESTINAL: No abdominal or epigastric pain. No nausea, vomiting, or hematemesis; No diarrhea  No melena or hematochezia.  GENITOURINARY: No dysuria, frequency, hematuria, or incontinence  NEUROLOGICAL: No headaches  SKIN: No itching,  no   rash  LYMPH Nodes: No enlarged glands  ENDOCRINE: No heat or cold intolerance  MUSCULOSKELETAL: No joint pain or swelling  PSYCHIATRIC: No depression, anxiety  HEME/LYMPH: No easy bruising, or bleeding gums  ALLERGY AND IMMUNOLOGIC: No hives or eczema	    MEDICATIONS  (STANDING):  atorvastatin 10 milliGRAM(s) Oral at bedtime  dextrose 5%. 1000 milliLiter(s) (50 mL/Hr) IV Continuous <Continuous>  dextrose 5%. 1000 milliLiter(s) (100 mL/Hr) IV Continuous <Continuous>  dextrose 50% Injectable 25 Gram(s) IV Push once  dextrose 50% Injectable 12.5 Gram(s) IV Push once  dextrose 50% Injectable 25 Gram(s) IV Push once  furosemide    Tablet 20 milliGRAM(s) Oral daily  glucagon  Injectable 1 milliGRAM(s) IntraMuscular once  insulin lispro (ADMELOG) corrective regimen sliding scale   SubCutaneous three times a day before meals  LORazepam     Tablet 1 milliGRAM(s) Oral once  senna 2 Tablet(s) Oral at bedtime    MEDICATIONS  (PRN):  dextrose Oral Gel 15 Gram(s) Oral once PRN Blood Glucose LESS THAN 70 milliGRAM(s)/deciliter  HYDROmorphone   Tablet 2 milliGRAM(s) Oral every 6 hours PRN Moderate Pain (4 - 6)  HYDROmorphone  Injectable 0.5 milliGRAM(s) IV Push every 12 hours PRN Severe Pain (7 - 10)      Vital Signs Last 24 Hrs  T(C): 36.4 (27 Aug 2024 04:46), Max: 37.4 (26 Aug 2024 14:19)  T(F): 97.6 (27 Aug 2024 04:46), Max: 99.4 (26 Aug 2024 14:19)  HR: 90 (27 Aug 2024 04:46) (90 - 99)  BP: 119/82 (27 Aug 2024 04:46) (114/79 - 119/82)  BP(mean): --  RR: 19 (27 Aug 2024 04:46) (18 - 19)  SpO2: 94% (27 Aug 2024 04:46) (92% - 94%)    Parameters below as of 27 Aug 2024 04:46  Patient On (Oxygen Delivery Method): room air      CAPILLARY BLOOD GLUCOSE      POCT Blood Glucose.: 133 mg/dL (26 Aug 2024 21:33)  POCT Blood Glucose.: 191 mg/dL (26 Aug 2024 17:27)  POCT Blood Glucose.: 134 mg/dL (26 Aug 2024 12:37)    I&O's Summary    26 Aug 2024 07:01  -  27 Aug 2024 07:00  --------------------------------------------------------  IN: 240 mL / OUT: 0 mL / NET: 240 mL          Appearance: Normal	  HEENT:   Normal oral mucosa, PERRL, EOMI	  Lymphatic: No lymphadenopathy  Cardiovascular: Normal S1 S2, No JVD  Respiratory: Lungs clear to auscultation	  Gastrointestinal:  Soft, Non-tender, + BS	  Skin: No rash, No ecchymoses	  Extremities:     LABS:                        11.1   9.52  )-----------( 439      ( 27 Aug 2024 07:24 )             35.0     08-26    139  |  98  |  11  ----------------------------<  130<H>  3.5   |  27  |  0.67    Ca    8.6      26 Aug 2024 07:16    TPro  7.1  /  Alb  2.8<L>  /  TBili  0.6  /  DBili  0.2  /  AST  12  /  ALT  10  /  AlkPhos  130<H>  08-26    PT/INR - ( 27 Aug 2024 07:28 )   PT: 13.9 sec;   INR: 1.33 ratio         PTT - ( 27 Aug 2024 07:28 )  PTT:36.6 sec      Urinalysis Basic - ( 26 Aug 2024 07:16 )    Color: x / Appearance: x / SG: x / pH: x  Gluc: 130 mg/dL / Ketone: x  / Bili: x / Urobili: x   Blood: x / Protein: x / Nitrite: x   Leuk Esterase: x / RBC: x / WBC x   Sq Epi: x / Non Sq Epi: x / Bacteria: x                      Consultant(s) Notes Reviewed:      Care Discussed with Consultants/Other Providers:

## 2024-08-27 NOTE — DISCHARGE NOTE PROVIDER - NSDCFUADDAPPT_GEN_ALL_CORE_FT
IR  follow  up APPTS ARE READY TO BE MADE: [ X ] YES    Best Family or Patient Contact (if needed):    Additional Information about above appointments (if needed):    1: Interventional Radiology  2: Surgery  3: Infectious Disease    Other comments or requests:    APPTS ARE READY TO BE MADE: [ X ] YES    Best Family or Patient Contact (if needed):    Additional Information about above appointments (if needed):    1: Interventional Radiology  2: Surgery  3: Infectious Disease    Other comments or requests:   Provided patient with provider referral information, however patient prefers to schedule the appointments on their own.

## 2024-08-27 NOTE — PROGRESS NOTE ADULT - ASSESSMENT
A 63yo male with hx of HTN, hypercholesteralemia, hypothyroidism with 2 months of abd distension, pain, night sweats, and weight loss with multiple hepatic bilobar simple cysts shown on imaging.     Plan:  - Planning for IR to drain cysts   - Attending to review images for cysts to target  - liver workup completed  - cardiac workup per primary  - Rest of care per primary    [facility] Surgery [phone] A 65yo male with hx of HTN, hypercholesteralemia, hypothyroidism with 2 months of abd distension, pain, night sweats, and weight loss with multiple hepatic bilobar simple cysts shown on imaging.     Plan:  - IR drainage of extrahepatic cyst today vs. tomorrow   - Cardiac workup per primary  - Rest of care per primary    [facility] Surgery [phone]

## 2024-08-27 NOTE — PROGRESS NOTE ADULT - SUBJECTIVE AND OBJECTIVE BOX
Date of Service: 08-27-24 @ 08:21           CARDIOLOGY     PROGRESS  NOTE   ________________________________________________    CHIEF COMPLAINT:Patient is a 64y old  Male who presents with a chief complaint of abd  pain (27 Aug 2024 01:32)  no complain doing well  	  REVIEW OF SYSTEMS:  CONSTITUTIONAL: No fever, weight loss, or fatigue  EYES: No eye pain, visual disturbances, or discharge  ENT:  No difficulty hearing, tinnitus, vertigo; No sinus or throat pain  NECK: No pain or stiffness  RESPIRATORY: No cough, wheezing, chills or hemoptysis; No Shortness of Breath  CARDIOVASCULAR: No chest pain, palpitations, passing out, dizziness, or leg swelling  GASTROINTESTINAL: No abdominal or epigastric pain. No nausea, vomiting, or hematemesis; No diarrhea or constipation. No melena or hematochezia.  GENITOURINARY: No dysuria, frequency, hematuria, or incontinence  NEUROLOGICAL: No headaches, memory loss, loss of strength, numbness, or tremors  SKIN: No itching, burning, rashes, or lesions   LYMPH Nodes: No enlarged glands  ENDOCRINE: No heat or cold intolerance; No hair loss  MUSCULOSKELETAL: No joint pain or swelling; No muscle, back, or extremity pain  PSYCHIATRIC: No depression, anxiety, mood swings, or difficulty sleeping  HEME/LYMPH: No easy bruising, or bleeding gums  ALLERGY AND IMMUNOLOGIC: No hives or eczema	    [x ] All others negative	  [ ] Unable to obtain    PHYSICAL EXAM:  T(C): 36.4 (08-27-24 @ 04:46), Max: 37.4 (08-26-24 @ 14:19)  HR: 90 (08-27-24 @ 04:46) (90 - 99)  BP: 119/82 (08-27-24 @ 04:46) (114/79 - 119/82)  RR: 19 (08-27-24 @ 04:46) (18 - 19)  SpO2: 94% (08-27-24 @ 04:46) (92% - 94%)  Wt(kg): --  I&O's Summary    26 Aug 2024 07:01  -  27 Aug 2024 07:00  --------------------------------------------------------  IN: 240 mL / OUT: 0 mL / NET: 240 mL        Appearance: Normal	  HEENT:   Normal oral mucosa, PERRL, EOMI	  Lymphatic: No lymphadenopathy  Cardiovascular: Normal S1 S2, No JVD, + murmurs, No edema  Respiratory: rhonchi	  Psychiatry: A & O x 3, Mood & affect appropriate  Gastrointestinal:  Soft, Non-tender, + BS	  Skin: No rashes, No ecchymoses, No cyanosis	  Neurologic: Non-focal  Extremities: Normal range of motion, No clubbing, cyanosis or edema  Vascular: Peripheral pulses palpable 2+ bilaterally    MEDICATIONS  (STANDING):  atorvastatin 10 milliGRAM(s) Oral at bedtime  dextrose 5%. 1000 milliLiter(s) (50 mL/Hr) IV Continuous <Continuous>  dextrose 5%. 1000 milliLiter(s) (100 mL/Hr) IV Continuous <Continuous>  dextrose 50% Injectable 25 Gram(s) IV Push once  dextrose 50% Injectable 12.5 Gram(s) IV Push once  dextrose 50% Injectable 25 Gram(s) IV Push once  furosemide    Tablet 20 milliGRAM(s) Oral daily  glucagon  Injectable 1 milliGRAM(s) IntraMuscular once  insulin lispro (ADMELOG) corrective regimen sliding scale   SubCutaneous three times a day before meals  LORazepam     Tablet 1 milliGRAM(s) Oral once  senna 2 Tablet(s) Oral at bedtime      TELEMETRY: 	    ECG:  	  RADIOLOGY:  OTHER: 	  	  LABS:	 	    CARDIAC MARKERS:                            11.1   9.52  )-----------( 439      ( 27 Aug 2024 07:24 )             35.0     08-26    139  |  98  |  11  ----------------------------<  130<H>  3.5   |  27  |  0.67    Ca    8.6      26 Aug 2024 07:16    TPro  7.1  /  Alb  2.8<L>  /  TBili  0.6  /  DBili  0.2  /  AST  12  /  ALT  10  /  AlkPhos  130<H>  08-26    proBNP:   Lipid Profile:   HgA1c:   TSH:   PT/INR - ( 27 Aug 2024 07:28 )   PT: 13.9 sec;   INR: 1.33 ratio         PTT - ( 27 Aug 2024 07:28 )  PTT:36.6 sec    < from: TTE W or WO Ultrasound Enhancing Agent (08.26.24 @ 07:15) >   1. Technically difficult image quality.   2. Left ventricular cavity is normal in size. Left ventricular systolic function is normal with an ejection fraction visually estimated at 60 to 65 %. There are no regional wall motion abnormalities seen.   3. Mildly enlarged right ventricular cavity size and normal right ventricular systolic function.   4. Normal left and right atrial size.   5. No significant valvular disease.   6. No pericardial effusion seen.   7. Abdominal ascites is incidentally noted.   8. No prior echocardiogram is available for comparison.    < from: VA Duplex Lower Ext Vein Scan, Bilat (08.23.24 @ 22:35) >    RIGHT:  Normal compressibility of the RIGHT common femoral, femoral and popliteal   veins.  Doppler examination shows normal spontaneous and phasic flow.  No RIGHT calf vein thrombosis is detected.    LEFT:  Normal compressibility of the LEFT common femoral, femoral and popliteal   veins.  Doppler examination shows normal spontaneous and phasic flow.  No LEFT calf vein thrombosis is detected.    IMPRESSION:  No evidence of deep venous thrombosis in either lower extremity.      < from: CT Angio Chest PE Protocol w/ IV Cont (08.23.24 @ 16:11) >  No pulmonary embolism.    Right hemidiaphragm elevation with mild atelectasis involving the right   middle lobe and right base.        Assessment and plan  ---------------------------  64-year-old male with PMHx of hypertension, hypercholesterolemia, hypothyroidism presenting with 2 months of abdominal distention and pain, night sweats, and weight loss of 25 pounds.  Endorses SOB with trouble taking deep breaths, improving. Patient is a former smoker, quit 1 year ago. + constipation, + dysuria intermittent x 1 month.  Last bowel movement 2 days ago, wnl. + flatulance.  Patient evaluated at [place] ED 1 month ago, had CT scan and US, told he had benign liver cysts and discharged. When abd discomfort  did not improve pt followed up with his PCP who sent him for an MRI 2 days ago.  Patient called last night and told to go to ED for follow-up testing. Results were not discussed with him. Denies chest pain, fever, cough, recent travel.  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 Denies chronic alcohol use. Pt did at home H pylori test last week, came back positive.  pt with hx of htn, dm with hepatic mass with  increasing sob and LE  edema  no cardiac dawn done before  awaiting repeat ecg, noted NSR  echo  tsh  lipid panel  will adjust bp meds  decrease lasix  dvt prophylaxis  awaiting  MRI of the abdomen and pelvis  awaiting TTE  replete Lytes  ct scan noted, hepatic cyst  decrease lasix to 20 mg daily,echo noted with Rv enlarged mild , needs sleep study as out pt, np DVT or PE  fu as out pt for ischemia dawn

## 2024-08-27 NOTE — PROGRESS NOTE ADULT - SUBJECTIVE AND OBJECTIVE BOX
SURGERY DAILY PROGRESS NOTE    24 Hour/Overnight Events: No acute events     SUBJECTIVE: Notes mild abd pain, unchanged. Pt is passing gas, denies n/v    ------------------------------------------------------------------------------------------------------------  OBJECTIVE:  Vital Signs Last 24 Hrs  T(C): 37.1 (26 Aug 2024 21:20), Max: 37.4 (26 Aug 2024 14:19)  T(F): 98.7 (26 Aug 2024 21:20), Max: 99.4 (26 Aug 2024 14:19)  HR: 99 (26 Aug 2024 21:20) (94 - 99)  BP: 114/79 (26 Aug 2024 21:20) (106/76 - 114/82)  BP(mean): --  RR: 19 (26 Aug 2024 21:20) (18 - 19)  SpO2: 92% (26 Aug 2024 21:20) (92% - 94%)    Parameters below as of 26 Aug 2024 21:20  Patient On (Oxygen Delivery Method): room air      I&O's Detail    25 Aug 2024 07:01  -  26 Aug 2024 07:00  --------------------------------------------------------  IN:    IV PiggyBack: 100 mL    Oral Fluid: 540 mL  Total IN: 640 mL  OUT:  Total OUT: 0 mL  Total NET: 640 mL    26 Aug 2024 07:01  -  27 Aug 2024 01:33  --------------------------------------------------------  IN:    Oral Fluid: 240 mL  Total IN: 240 mL  OUT:  Total OUT: 0 mL  Total NET: 240 mL      PHYSICAL EXAM:  General: NAD   Respiratory: no increased WOB  Abdominal: Soft, obese abdomen, mild tenderness in R abdomen  Extremities: WWP

## 2024-08-27 NOTE — DISCHARGE NOTE PROVIDER - NSFOLLOWUPCLINICS_GEN_ALL_ED_FT
[facility]  Surgery  [address] [place] [address]  Phone: [phone]  Fax: [phone]

## 2024-08-27 NOTE — DISCHARGE NOTE PROVIDER - CARE PROVIDERS DIRECT ADDRESSES
,DirectAddress_Unknown ,DirectAddress_Unknown,DirectAddress_Unknown ,DirectAddress_Unknown,DirectAddress_Unknown,[email or web address]

## 2024-08-27 NOTE — PROGRESS NOTE ADULT - ASSESSMENT
64-year-old male with PMHx of hypertension, hypercholesterolemia, hypothyroidism presenting with pain abdomen,  weakness, weight loss since July. He started feeling unwell since July 4th.  He had significant weight loss of 25 pounds. Former smoker, quit 1 year ago. He was  evaluated at [place] ED 1 month ago, had CT scan and US, told he had benign liver cysts and discharged.  Due  to  persistent  abd  pain,  his  pcp,   did  an  MRI and pt  was   called last night and told to go to  er  for follow-up testing.     Has been afebrile, NO leukocytosis, preserved renal/liver functions, CRP high 197  Has outpt MRI disc and reports with him - large cystic lesions noted.   Report placed in chart at nursing station.  CT repeated and reveals large cysts.    # Multiple liver  cysts    - No systemic signs of infection. Cysts do not appear to be infected. ?cause of wt loss, doubt from cysts.  - Low concern for parasitic infection  - Off cefepime as of 8/26  - For IR drainage of cyst - please check bacterial, fungal, AFB cultures.   - follow up hepatology/surgery eval  - follow up blood culture in lab - NGTD  - f/up  entamoeba serology  -  quant gold was negative  - f/up hepatitis panel - negative    D/w NP  I am away starting tomorrow. My associate is available to see. Please call [phone] with questions      Rebecca Lee MD  [phone] (pager)  [phone] (office) .

## 2024-08-27 NOTE — DISCHARGE NOTE PROVIDER - HOSPITAL COURSE
64-year-old male     h/o  HTN.  HLD,  hypothyroidism,  ex  smoker . quit  a yr  ago           abdominal distention, pain, night sweats,   weight loss of 25 pounds.   for  2  months        at  [place]  er 1 month ago, CT scan/ US, told he had benign liver cysts and d/c . MRI  outpt,  then told togo  to  er   /   denies chest pain, fever, cough, recent travel.       abd  pain,  from  cysts/  infecetd  cysts.  source  of  infection remains  unclear       MRI    8/21/24. ,   poststomach,  heterogenous cyst  9.2 x 6.4cm; upper pelvis heterogenous cyst, j right of midline 6.4 x 4.0 cm             right  .trace left pleural effusion. Few cystic  structures within the live 4.4cm.  lateral side,   21 x 14 cm cystic collection. abutting/ adjacent to the stomach,              cystic fluid structure measuring 9x6 cm ,  upper pelvis  cystic fluid structure heterogenous with debris      on arrival, pt  was  afebrile,  normal  wbc.  and  wa s not  septic             pedal  edema/  sob   on exertion,  , for past  month or  so              ?  acute  diastolic    chf.  .  was   on   iv lasix.  echo, normal  ef       HTN/  HLD       Hypothyroid       DM,  follow  fs       Anemia       obesity  bmi  39/  albumin in  2  range/.  c/w  moderate  protein  calorie  malnutrition. suspect  from on  going    sub acute,  hepatic infevtion        ekg.    nsr, low   voltage / Echo, normal  ef        Ct  chest  angio,  no pe,  hepatic  cysts.  upto  2 2 cm     CT a/p.  mlple  hepatic cysts ,, largest  about   20  by  15  cm/           R/ L hepatic   cyst drainage,  by IR on  8/27,  . with     purulent  drainage ,  froylan  R  drain,   has   b/l  drains              per  surg  attending,  no role  for  surg intervention              abscess  cx.  Bacteroides   vukgatus,             rpt  ct  ,  decompressed  hepatic  cysts ,    now  with   fidel gastric  cyst  drainage   on  8/ 30  by  IR          d/c  on ab,  per  ID,   dr ann,           wa s on iv   tocephin/  iv flagyl          dvt  ppx /  f/p  card/  IR/ has  3  drains    64-year-old male     h/o  HTN.  HLD,  hypothyroidism,  ex  smoker . quit  a yr  ago           abdominal distention, pain, night sweats,   weight loss of 25 pounds.   for  2  months        at  [place]  er 1 month ago, CT scan/ US, told he had benign liver cysts and d/c . MRI  outpt,  then told togo  to  er   /   denies chest pain, fever, cough, recent travel.       abd  pain,  from  cysts/  infecetd  cysts.  liver  abscesses.  pt  had  no  sepsis       MRI    8/21/24. ,   poststomach,  heterogenous cyst  9.2 x 6.4cm; upper pelvis heterogenous cyst, j right of midline 6.4 x 4.0 cm             right  .trace left pleural effusion. Few cystic  structures within the live 4.4cm.  lateral side,   21 x 14 cm cystic collection. abutting/ adjacent to the stomach,              cystic fluid structure measuring 9x6 cm ,  upper pelvis  cystic fluid structure heterogenous with debris      on arrival, pt  was  afebrile,  normal  wbc.  and  wa s not  septic             pedal  edema/  sob   on exertion,  , for past  month or  so              ?  acute  diastolic    chf.  .  was   on   iv lasix.  echo, normal  ef       HTN/  HLD       Hypothyroid       DM,  follow  fs       Anemia       obesity  bmi  39/  albumin in  2  range/.  c/w  moderate  protein  calorie  malnutrition. suspect  from on  going    sub acute,  hepatic infevtion        ekg.    nsr, low   voltage / Echo, normal  ef        Ct  chest  angio,  no pe,  hepatic  cysts.  upto  2 2 cm     CT a/p.  mlple  hepatic cysts ,, largest  about   20  by  15  cm/           R/ L hepatic   cyst drainage,  by IR on  8/27,  . with     purulent  drainage ,  froylan  R  drain,   has   b/l  drains              per  surg  attending,  no role  for  surg intervention              abscess  cx.  Bacteroides   vukgatus,             rpt  ct  ,  decompressed  hepatic  cysts ,    now  with   fidel gastric  cyst  drainage   on  8/ 30  by  IR          d/c  on ab,  per  ID,   dr ann,           wa s on iv   tocephin/  iv flagyl/   d/c  on po  flagy;/  cefpodoxime          dvt  ppx /  f/p  card/  IR/ has  3  drains    HPI:  64-year-old male       with PMHx of hypertension, hypercholesterolemia, hypothyroidism     presenting with 2 months of abdominal distention and pain, night sweats, and weight loss of 25 pounds.       former smoker, quit 1 year ago. ,  had   dysuria intermittent x 1 month.      was  evaluated at [place] ED 1 month ago, had CT scan and US, told he had benign liver cysts and discharged.     due  to  persistent  abd  pain,  his  pcp,   did  an  MRI 2 days ago.. pt  was    called last night and told to go to  er  for follow-up testing.     denies chest pain, fever, cough, recent travel.         (23 Aug 2024 13:38)    Hospital Course:  Patient underwent CT-guided drainage and drain placements for these hepatic abscesses. He underwent R/ L hepatic cyst drainage,  by IR on  8/27/24 with purulent  drainage, froylan  R drain, underwent a repeat CT, where hepatic  cysts were improved in size, s/p fidel gastric  cyst  drainage   on  8/ 30  by  IR. He has 3 drains in place, with plans for IR and Surgery follow-up for drain management.     Blood cultures were negative but asbcess sites grew Bacteriodes. Patient was on ceftriaxone and flagyl inpatient and will be discharged on po cefpodoxime 200 mg q12h and flagyl 500 mg q8h when ready for discharge for 4 weeks until 9/26/24.    Important Medication Changes and Reason:  PO Cefpodoxime 200 mg q12h and flagyl 500 mg q8h when ready for discharge for 4 weeks until 9/26/24.    Active or Pending Issues Requiring Follow-up:    Advanced Directives:   [ X ] Full code  [ ] DNR  [ ] Hospice    Discharge Diagnoses:  Hepatic abscesses           HPI:  64-year-old male       with PMHx of hypertension, hypercholesterolemia, hypothyroidism     presenting with 2 months of abdominal distention and pain, night sweats, and weight loss of 25 pounds.       former smoker, quit 1 year ago. ,  had   dysuria intermittent x 1 month.      was  evaluated at [place] ED 1 month ago, had CT scan and US, told he had benign liver cysts and discharged.     due  to  persistent  abd  pain,  his  pcp,   did  an  MRI 2 days ago.. pt  was    called last night and told to go to  er  for follow-up testing.     denies chest pain, fever, cough, recent travel.         (23 Aug 2024 13:38)    Hospital Course:  Patient underwent CT-guided drainage and drain placements for these hepatic abscesses. He underwent R/ L hepatic cyst drainage,  by IR on  8/27/24 with purulent  drainage, froylan  R drain, underwent a repeat CT, where hepatic  cysts were improved in size, s/p fidel gastric  cyst  drainage   on  8/ 30  by  IR. He has 3 drains in place, with plans for IR and Surgery follow-up for drain management.     Blood cultures were negative but asbcess sites grew Bacteriodes. Patient was on ceftriaxone and flagyl inpatient and will be discharged on po cefpodoxime 200 mg q12h and flagyl 500 mg q8h when ready for discharge for 4 weeks until 9/26/24.    Hepatic Drain Flushing instructions:  Turn the three-way stopcock off to the drainage bag.  Clean the flushing port with alcohol and attach the flush syringe.  Gently inject 5cc via flush.  Turn the stopcock off to the flushing port and open to the bag.    Please empty drain every morning and evening.    Important Medication Changes and Reason:  PO Cefpodoxime 200 mg q12h and flagyl 500 mg q8h when ready for discharge for 4 weeks until 9/26/24  Lasix 20mg PO daily    Active or Pending Issues Requiring Follow-up:    Advanced Directives:   [ X ] Full code  [ ] DNR  [ ] Hospice    Discharge Diagnoses:  Hepatic abscesses

## 2024-08-27 NOTE — DISCHARGE NOTE PROVIDER - NSDCCPTREATMENT_GEN_ALL_CORE_FT
PRINCIPAL PROCEDURE  Procedure: CT abdomen and pelvis w contrast  Findings and Treatment: FINDINGS:  LOWER CHEST: Trace right pleural effusion. Right lower lobe solid   pulmonary nodule measures 7 mm.  LIVER: Multiple bilobar cystic hypodensities with the largest measuring   20.8 x 15.4 cm in the right hepatic lobe. There is a left anterior cyst   that measures 19.1 x 15.7 cm. There is a left inferior cyst along the   greater curvature of the stomach that measures 7.4 x 5.8 cm with a   pedicle extending into the caudate lobe (series 4, image 70). There are   smaller hypodensities scattered throughout the liver.  BILE DUCTS: Normal caliber.  GALLBLADDER: Within normal limits.  SPLEEN: Within normal limits.  PANCREAS: Within normal limits.  ADRENALS: Within normal limits.  KIDNEYS/URETERS: Within normal limits.  BLADDER: Within normal limits.  REPRODUCTIVE ORGANS: Prostate is enlarged.  BOWEL: No bowel obstruction. Appendix is normal.  PERITONEUM/RETROPERITONEUM: Within normal limits.  VESSELS: There is stenosis of the right portal vein. The hepatic veins   are patent. Atherosclerotic changes.  LYMPH NODES: Multiple prominent subcentimeter periaortic lymph nodes.  ABDOMINAL WALL: Within normal limits.  BONES: Left hip total arthroplasty. Degenerative changes of the spine.        SECONDARY PROCEDURE  Procedure: CT chest w con  Findings and Treatment: IMPRESSION:  Decompressed hepatic cysts as above.  Nodular opacities in the right lung; recommend one-month follow-up.    Procedure: Venous duplex scan LLE  Findings and Treatment:

## 2024-08-27 NOTE — DISCHARGE NOTE PROVIDER - NSDCMRMEDTOKEN_GEN_ALL_CORE_FT
acetaminophen 325 mg oral tablet: 2 tab(s) orally every 6 hours  amlodipine-benazepril 10 mg-20 mg oral capsule: 1 cap(s) orally once a day  ascorbic acid 500 mg oral tablet: 1 tab(s) orally 2 times a day  aspirin 81 mg oral tablet: 1 tab(s) orally once a day  Aspirin Enteric Coated 81 mg oral delayed release tablet: 1 tab(s) orally 2 times a day MDD: 2  celecoxib 200 mg oral capsule: 1 cap(s) orally every 12 hours  cyclobenzaprine 10 mg oral tablet: 1 tab(s) orally 3 times a day MDD: 3  gabapentin 300 mg oral capsule: 1 cap(s) orally every 12 hours for two weeks MDD: 2  levothyroxine 50 mcg (0.05 mg) oral tablet: 1 tab(s) orally once a day  levothyroxine 50 mcg (0.05 mg) oral tablet: 1 orally once a day  Lotrel 10 mg-20 mg oral capsule: 1 orally once a day  metFORMIN 500 mg oral tablet: 1 orally 2 times a day  metFORMIN 500 mg oral tablet: 1 tab(s) orally 2 times a day  Multiple Vitamins oral tablet: 1 tab(s) orally once a day  Narcan 4 mg/0.1 mL nasal spray: 4 milligram(s) intranasally once , repeat as necessary.   As needed. For suspected opiate overdose   Follow instructions on packet MDD: 0.2 ml  pantoprazole 40 mg oral delayed release tablet: 1 tab(s) orally once a day (before a meal) MDD: 1  polyethylene glycol 3350 oral powder for reconstitution: 17 gram(s) orally once a day (at bedtime)  simvastatin 40 mg oral tablet: 1 tab(s) orally once a day  simvastatin 40 mg oral tablet: 1 orally once a day  tobramycin 0.3% ophthalmic solution: 1 drop(s) to each affected eye every 4 hours   acetaminophen 325 mg oral tablet: 2 tab(s) orally every 6 hours  amlodipine-benazepril 10 mg-20 mg oral capsule: 1 cap(s) orally once a day  aspirin 81 mg oral tablet: 1 tab(s) orally once a day  Aspirin Enteric Coated 81 mg oral delayed release tablet: 1 tab(s) orally 2 times a day MDD: 2  levothyroxine 50 mcg (0.05 mg) oral tablet: 1 tab(s) orally once a day  levothyroxine 50 mcg (0.05 mg) oral tablet: 1 orally once a day  Lotrel 10 mg-20 mg oral capsule: 1 orally once a day  metFORMIN 500 mg oral tablet: 1 orally 2 times a day  metFORMIN 500 mg oral tablet: 1 tab(s) orally 2 times a day  pantoprazole 40 mg oral delayed release tablet: 1 tab(s) orally once a day (before a meal) MDD: 1  polyethylene glycol 3350 oral powder for reconstitution: 17 gram(s) orally once a day (at bedtime)  simvastatin 40 mg oral tablet: 1 tab(s) orally once a day  simvastatin 40 mg oral tablet: 1 orally once a day   amlodipine-benazepril 10 mg-20 mg oral capsule: 1 cap(s) orally once a day  aspirin 81 mg oral tablet: 1 tab(s) orally once a day  Aspirin Enteric Coated 81 mg oral delayed release tablet: 1 tab(s) orally 2 times a day MDD: 2  cefpodoxime 200 mg oral tablet: 1 tab(s) orally every 12 hours Please take 1 tablet twice daily through 9/25/24  levothyroxine 50 mcg (0.05 mg) oral tablet: 1 tab(s) orally once a day  Lotrel 10 mg-20 mg oral capsule: 1 orally once a day  metFORMIN 500 mg oral tablet: 1 tab(s) orally 2 times a day  metroNIDAZOLE 500 mg oral tablet: 1 tab(s) orally every 8 hours Please take one tablet every 8 hours through 9/25/24  pantoprazole 40 mg oral delayed release tablet: 1 tab(s) orally once a day (before a meal) MDD: 1  polyethylene glycol 3350 oral powder for reconstitution: 17 gram(s) orally once a day (at bedtime)  simvastatin 40 mg oral tablet: 1 tab(s) orally once a day  simvastatin 40 mg oral tablet: 1 orally once a day   amlodipine-benazepril 10 mg-20 mg oral capsule: 1 cap(s) orally once a day  aspirin 81 mg oral tablet: 1 tab(s) orally once a day  cefpodoxime 200 mg oral tablet: 1 tab(s) orally every 12 hours Please take 1 tablet twice daily through 9/25/24  furosemide 20 mg oral tablet: 1 tab(s) orally once a day please take 1 tablet daily at least until seeing your PCP  levothyroxine 50 mcg (0.05 mg) oral tablet: 1 tab(s) orally once a day  Lotrel 10 mg-20 mg oral capsule: 1 orally once a day  metFORMIN 500 mg oral tablet: 1 tab(s) orally 2 times a day  metroNIDAZOLE 500 mg oral tablet: 1 tab(s) orally every 8 hours Please take one tablet every 8 hours through 9/25/24  pantoprazole 40 mg oral delayed release tablet: 1 tab(s) orally once a day (before a meal) MDD: 1  polyethylene glycol 3350 oral powder for reconstitution: 17 gram(s) orally once a day (at bedtime)  simvastatin 40 mg oral tablet: 1 tab(s) orally once a day  simvastatin 40 mg oral tablet: 1 orally once a day

## 2024-08-27 NOTE — DISCHARGE NOTE PROVIDER - CARE PROVIDER_API CALL
[facility]  Cardiovascular Disease  [address] [facility] [address]  [place] [address]  Phone: [phone]  Fax: [phone]  Established Patient  Scheduled Appointment: 09/05/2024 02:00 PM   Hiro Kaur  Cardiovascular Disease  [address] [place] [address] [address]  [place] [address]  Phone: [phone]  Fax: [phone]  Established Patient  Scheduled Appointment: 09/19/2024 02:00 PM   Hiro Kaur  Cardiovascular Disease  [address] [facility] [address]  [place] [address]  Phone: [phone]  Fax: [phone]  Established Patient  Scheduled Appointment: 09/19/2024 02:00 PM    Sebastien Buenrostro  Interventional Radiology and Diagnostic Radiology  [address] [place] [address]  Phone: [phone]  Fax: [phone]  Follow Up Time:    Hiro Kaur  Cardiovascular Disease  [address] [facility], [address]  [place] [address]  Phone: [phone]  Fax: [phone]  Established Patient  Scheduled Appointment: 09/19/2024 02:00 PM    Sebastien Buenrostro  Interventional Radiology and Diagnostic Radiology  [address] [place] [address]  Phone: [phone]  Fax: [phone]  Follow Up Time:     Omayra Fisher  Infectious Disease  [address] [place] [address]  Phone: [phone]  Fax: [phone]  Follow Up Time: 2 weeks

## 2024-08-27 NOTE — CHART NOTE - NSCHARTNOTEFT_GEN_A_CORE
Interventional Radiology    64y Male with PMHx of HTN, HLD, hypothyroidism presenting with 2 months of abdominal distention and pain, night sweats, and weight loss of 25 pounds. CT with hepatic and extrahepatic cysts. IR consulted for drainage.     Allergies: No Known Allergies    Medications (Abx/Cardiac/Anticoagulation/Blood Products)  cefepime   IVPB: 100 mL/Hr IV Intermittent (08-26 @ 13:33)  furosemide    Tablet: 20 milliGRAM(s) Oral (08-27 @ 05:45)  furosemide   Injectable: 40 milliGRAM(s) IV Push (08-26 @ 06:31)  heparin   Injectable: 5000 Unit(s) SubCutaneous (08-26 @ 21:50)  heparin   Injectable: 5000 Unit(s) SubCutaneous (08-26 @ 13:33)    Data:  T(C): 36.4  HR: 90  BP: 119/82  RR: 19  SpO2: 94%    -WBC 9.52 / HgB 11.1 / Hct 35.0 / Plt 439  -Na 136 / Cl 95 / BUN 11 / Glucose 125  -K 3.4 / CO2 27 / Cr 0.67  -ALT 10 / Alk Phos 136 / T.Bili 0.7  -INR 1.33 / PTT 36.6    Assessment/Plan:  64y Male with PMHx of HTN, HLD, hypothyroidism presenting with 2 months of abdominal distention and pain, night sweats, and weight loss of 25 pounds. CT with hepatic and extrahepatic cysts. IR consulted for drainage.     Case discussed with Dr. Caputo.            Kathrine Bowers MD   Interventional Radiology     Contact on Microsoft Teams for nonemergent issues    - Nonemergent consults:  [facility] order "Consult- Interventional Radiology", no page required  - Emergent issues (pager): [facility] [phone]; [facility] [phone]; [phone]; DO NOT PAGE FOR SCHEDULING QUESTIONS  - Scheduling questions 8am-6pm : [facility] [phone]; [facility] [phone],   - Clinic/outpatient booking: [facility] [phone]; [facility] [phone] Interventional Radiology    64y Male with PMHx of HTN, HLD, hypothyroidism presenting with 2 months of abdominal distention and pain, night sweats, and weight loss of 25 pounds. CT with hepatic and extrahepatic cysts. IR consulted for drainage.     Allergies: No Known Allergies    Medications (Abx/Cardiac/Anticoagulation/Blood Products)  cefepime   IVPB: 100 mL/Hr IV Intermittent (08-26 @ 13:33)  furosemide    Tablet: 20 milliGRAM(s) Oral (08-27 @ 05:45)  furosemide   Injectable: 40 milliGRAM(s) IV Push (08-26 @ 06:31)  heparin   Injectable: 5000 Unit(s) SubCutaneous (08-26 @ 21:50)  heparin   Injectable: 5000 Unit(s) SubCutaneous (08-26 @ 13:33)    Data:  T(C): 36.4  HR: 90  BP: 119/82  RR: 19  SpO2: 94%    -WBC 9.52 / HgB 11.1 / Hct 35.0 / Plt 439  -Na 136 / Cl 95 / BUN 11 / Glucose 125  -K 3.4 / CO2 27 / Cr 0.67  -ALT 10 / Alk Phos 136 / T.Bili 0.7  -INR 1.33 / PTT 36.6    Assessment/Plan:  64y Male with PMHx of HTN, HLD, hypothyroidism presenting with 2 months of abdominal distention and pain, night sweats, and weight loss of 25 pounds. CT with hepatic and extrahepatic cysts. IR consulted for drainage.     Case discussed with Dr. Caputo.    - plan for hepatic/extrahepatic cyst drainage today  - please place order for IR procedure under Dr. Caputo  - keep NPO  - continue to hold anticoagulation       Kathrine Bowers MD   Interventional Radiology     Contact on Microsoft Teams for nonemergent issues    - Nonemergent consults:  [facility] order "Consult- Interventional Radiology", no page required  - Emergent issues (pager): [facility] [phone]; [facility] [phone]; [phone]; DO NOT PAGE FOR SCHEDULING QUESTIONS  - Scheduling questions 8am-6pm : [facility] [phone]; [facility] [phone],   - Clinic/outpatient booking: [facility] [phone]; [facility] [phone]

## 2024-08-28 LAB
ADD ON TEST-SPECIMEN IN LAB: SIGNIFICANT CHANGE UP
ALBUMIN SERPL ELPH-MCNC: 2.7 G/DL — LOW (ref 3.3–5)
ALP SERPL-CCNC: 119 U/L — SIGNIFICANT CHANGE UP (ref 40–120)
ALT FLD-CCNC: 11 U/L — SIGNIFICANT CHANGE UP (ref 10–45)
ANION GAP SERPL CALC-SCNC: 14 MMOL/L — SIGNIFICANT CHANGE UP (ref 5–17)
ANION GAP SERPL CALC-SCNC: 9 MMOL/L — SIGNIFICANT CHANGE UP (ref 5–17)
AST SERPL-CCNC: 22 U/L — SIGNIFICANT CHANGE UP (ref 10–40)
BILIRUB DIRECT SERPL-MCNC: 0.2 MG/DL — SIGNIFICANT CHANGE UP (ref 0–0.3)
BILIRUB INDIRECT FLD-MCNC: 0.4 MG/DL — SIGNIFICANT CHANGE UP (ref 0.2–1)
BILIRUB SERPL-MCNC: 0.6 MG/DL — SIGNIFICANT CHANGE UP (ref 0.2–1.2)
BUN SERPL-MCNC: 9 MG/DL — SIGNIFICANT CHANGE UP (ref 7–23)
BUN SERPL-MCNC: 9 MG/DL — SIGNIFICANT CHANGE UP (ref 7–23)
CALCIUM SERPL-MCNC: 8.7 MG/DL — SIGNIFICANT CHANGE UP (ref 8.4–10.5)
CALCIUM SERPL-MCNC: 8.9 MG/DL — SIGNIFICANT CHANGE UP (ref 8.4–10.5)
CHLORIDE SERPL-SCNC: 97 MMOL/L — SIGNIFICANT CHANGE UP (ref 96–108)
CHLORIDE SERPL-SCNC: 98 MMOL/L — SIGNIFICANT CHANGE UP (ref 96–108)
CO2 SERPL-SCNC: 26 MMOL/L — SIGNIFICANT CHANGE UP (ref 22–31)
CO2 SERPL-SCNC: 30 MMOL/L — SIGNIFICANT CHANGE UP (ref 22–31)
CREAT SERPL-MCNC: 0.63 MG/DL — SIGNIFICANT CHANGE UP (ref 0.5–1.3)
CREAT SERPL-MCNC: 0.64 MG/DL — SIGNIFICANT CHANGE UP (ref 0.5–1.3)
EGFR: 106 ML/MIN/1.73M2 — SIGNIFICANT CHANGE UP
EGFR: 106 ML/MIN/1.73M2 — SIGNIFICANT CHANGE UP
GLUCOSE BLDC GLUCOMTR-MCNC: 120 MG/DL — HIGH (ref 70–99)
GLUCOSE BLDC GLUCOMTR-MCNC: 124 MG/DL — HIGH (ref 70–99)
GLUCOSE BLDC GLUCOMTR-MCNC: 137 MG/DL — HIGH (ref 70–99)
GLUCOSE BLDC GLUCOMTR-MCNC: 147 MG/DL — HIGH (ref 70–99)
GLUCOSE SERPL-MCNC: 120 MG/DL — HIGH (ref 70–99)
GLUCOSE SERPL-MCNC: 125 MG/DL — HIGH (ref 70–99)
GRAM STN FLD: SIGNIFICANT CHANGE UP
GRAM STN FLD: SIGNIFICANT CHANGE UP
HCT VFR BLD CALC: 35 % — LOW (ref 39–50)
HGB BLD-MCNC: 11.2 G/DL — LOW (ref 13–17)
MCHC RBC-ENTMCNC: 26.3 PG — LOW (ref 27–34)
MCHC RBC-ENTMCNC: 32 GM/DL — SIGNIFICANT CHANGE UP (ref 32–36)
MCV RBC AUTO: 82.2 FL — SIGNIFICANT CHANGE UP (ref 80–100)
NIGHT BLUE STAIN TISS: SIGNIFICANT CHANGE UP
NIGHT BLUE STAIN TISS: SIGNIFICANT CHANGE UP
NRBC # BLD: 0 /100 WBCS — SIGNIFICANT CHANGE UP (ref 0–0)
PLATELET # BLD AUTO: 417 K/UL — HIGH (ref 150–400)
POTASSIUM SERPL-MCNC: 3.3 MMOL/L — LOW (ref 3.5–5.3)
POTASSIUM SERPL-MCNC: 3.7 MMOL/L — SIGNIFICANT CHANGE UP (ref 3.5–5.3)
POTASSIUM SERPL-SCNC: 3.3 MMOL/L — LOW (ref 3.5–5.3)
POTASSIUM SERPL-SCNC: 3.7 MMOL/L — SIGNIFICANT CHANGE UP (ref 3.5–5.3)
PROT SERPL-MCNC: 7.2 G/DL — SIGNIFICANT CHANGE UP (ref 6–8.3)
RBC # BLD: 4.26 M/UL — SIGNIFICANT CHANGE UP (ref 4.2–5.8)
RBC # FLD: 15.1 % — HIGH (ref 10.3–14.5)
SODIUM SERPL-SCNC: 136 MMOL/L — SIGNIFICANT CHANGE UP (ref 135–145)
SODIUM SERPL-SCNC: 138 MMOL/L — SIGNIFICANT CHANGE UP (ref 135–145)
SPECIMEN SOURCE: SIGNIFICANT CHANGE UP
WBC # BLD: 9.31 K/UL — SIGNIFICANT CHANGE UP (ref 3.8–10.5)
WBC # FLD AUTO: 9.31 K/UL — SIGNIFICANT CHANGE UP (ref 3.8–10.5)

## 2024-08-28 PROCEDURE — 99233 SBSQ HOSP IP/OBS HIGH 50: CPT

## 2024-08-28 PROCEDURE — 99232 SBSQ HOSP IP/OBS MODERATE 35: CPT | Mod: GC

## 2024-08-28 RX ORDER — PIPERACILLIN SODIUM AND TAZOBACTAM SODIUM 3; .375 G/15ML; G/15ML
3.38 INJECTION, POWDER, FOR SOLUTION INTRAVENOUS ONCE
Refills: 0 | Status: COMPLETED | OUTPATIENT
Start: 2024-08-28 | End: 2024-08-28

## 2024-08-28 RX ORDER — POTASSIUM CHLORIDE 10 MEQ
40 TABLET, EXT RELEASE, PARTICLES/CRYSTALS ORAL ONCE
Refills: 0 | Status: COMPLETED | OUTPATIENT
Start: 2024-08-28 | End: 2024-08-28

## 2024-08-28 RX ORDER — POLYETHYLENE GLYCOL 3350 17 G/17G
17 POWDER, FOR SOLUTION ORAL DAILY
Refills: 0 | Status: DISCONTINUED | OUTPATIENT
Start: 2024-08-28 | End: 2024-09-02

## 2024-08-28 RX ORDER — PIPERACILLIN SODIUM AND TAZOBACTAM SODIUM 3; .375 G/15ML; G/15ML
3.38 INJECTION, POWDER, FOR SOLUTION INTRAVENOUS EVERY 8 HOURS
Refills: 0 | Status: DISCONTINUED | OUTPATIENT
Start: 2024-08-28 | End: 2024-08-30

## 2024-08-28 RX ORDER — HEPARIN SODIUM,BOVINE 1000/ML
5000 VIAL (ML) INJECTION EVERY 8 HOURS
Refills: 0 | Status: DISCONTINUED | OUTPATIENT
Start: 2024-08-28 | End: 2024-08-29

## 2024-08-28 RX ADMIN — PIPERACILLIN SODIUM AND TAZOBACTAM SODIUM 25 GRAM(S): 3; .375 INJECTION, POWDER, FOR SOLUTION INTRAVENOUS at 13:30

## 2024-08-28 RX ADMIN — Medication 20 MILLIGRAM(S): at 05:56

## 2024-08-28 RX ADMIN — Medication 2 TABLET(S): at 22:12

## 2024-08-28 RX ADMIN — Medication 10 MILLIGRAM(S): at 22:12

## 2024-08-28 RX ADMIN — Medication 5000 UNIT(S): at 13:30

## 2024-08-28 RX ADMIN — PIPERACILLIN SODIUM AND TAZOBACTAM SODIUM 200 GRAM(S): 3; .375 INJECTION, POWDER, FOR SOLUTION INTRAVENOUS at 13:26

## 2024-08-28 RX ADMIN — Medication 5000 UNIT(S): at 22:12

## 2024-08-28 RX ADMIN — PIPERACILLIN SODIUM AND TAZOBACTAM SODIUM 25 GRAM(S): 3; .375 INJECTION, POWDER, FOR SOLUTION INTRAVENOUS at 22:13

## 2024-08-28 RX ADMIN — Medication 40 MILLIEQUIVALENT(S): at 13:26

## 2024-08-28 NOTE — PROGRESS NOTE ADULT - SUBJECTIVE AND OBJECTIVE BOX
date of service: 08-28-24 @ 08:56  afberile  REVIEW OF SYSTEMS:  CONSTITUTIONAL: No fever,  no  weight loss  ENT:  No  tinnitus,   no   vertigo  NECK: No pain or stiffness  RESPIRATORY: No cough, wheezing, chills or hemoptysis;    No Shortness of Breath  CARDIOVASCULAR: No chest pain, palpitations, dizziness  GASTROINTESTINAL: No abdominal or epigastric pain. No nausea, vomiting, or hematemesis; No diarrhea  No melena or hematochezia.  GENITOURINARY: No dysuria, frequency, hematuria, or incontinence  NEUROLOGICAL: No headaches  SKIN: No itching,  no   rash  LYMPH Nodes: No enlarged glands  ENDOCRINE: No heat or cold intolerance  MUSCULOSKELETAL: No joint pain or swelling  PSYCHIATRIC: No depression, anxiety  HEME/LYMPH: No easy bruising, or bleeding gums  ALLERGY AND IMMUNOLOGIC: No hives or eczema	    MEDICATIONS  (STANDING):  atorvastatin 10 milliGRAM(s) Oral at bedtime  dextrose 5%. 1000 milliLiter(s) (100 mL/Hr) IV Continuous <Continuous>  dextrose 5%. 1000 milliLiter(s) (50 mL/Hr) IV Continuous <Continuous>  dextrose 50% Injectable 25 Gram(s) IV Push once  dextrose 50% Injectable 12.5 Gram(s) IV Push once  dextrose 50% Injectable 25 Gram(s) IV Push once  furosemide    Tablet 20 milliGRAM(s) Oral daily  glucagon  Injectable 1 milliGRAM(s) IntraMuscular once  insulin lispro (ADMELOG) corrective regimen sliding scale   SubCutaneous three times a day before meals  LORazepam     Tablet 1 milliGRAM(s) Oral once  senna 2 Tablet(s) Oral at bedtime    MEDICATIONS  (PRN):  dextrose Oral Gel 15 Gram(s) Oral once PRN Blood Glucose LESS THAN 70 milliGRAM(s)/deciliter  HYDROmorphone   Tablet 2 milliGRAM(s) Oral every 6 hours PRN Moderate Pain (4 - 6)      Vital Signs Last 24 Hrs  T(C): 36.6 (28 Aug 2024 04:32), Max: 36.6 (27 Aug 2024 13:06)  T(F): 97.8 (28 Aug 2024 04:32), Max: 97.8 (27 Aug 2024 13:06)  HR: 87 (28 Aug 2024 04:32) (87 - 96)  BP: 115/75 (28 Aug 2024 04:32) (109/70 - 141/77)  BP(mean): 88 (27 Aug 2024 16:40) (79 - 98)  RR: 18 (28 Aug 2024 04:32) (16 - 27)  SpO2: 94% (28 Aug 2024 04:32) (94% - 100%)    Parameters below as of 28 Aug 2024 04:32  Patient On (Oxygen Delivery Method): room air      CAPILLARY BLOOD GLUCOSE      POCT Blood Glucose.: 120 mg/dL (28 Aug 2024 08:31)  POCT Blood Glucose.: 124 mg/dL (27 Aug 2024 21:32)  POCT Blood Glucose.: 160 mg/dL (27 Aug 2024 17:07)  POCT Blood Glucose.: 123 mg/dL (27 Aug 2024 12:54)    I&O's Summary    27 Aug 2024 07:01  -  28 Aug 2024 07:00  --------------------------------------------------------  IN: 240 mL / OUT: 4125 mL / NET: -3885 mL          Appearance: Normal	  HEENT:   Normal oral mucosa, PERRL, EOMI	  Lymphatic: No lymphadenopathy  Cardiovascular: Normal S1 S2, No JVD  Respiratory: Lungs clear to auscultation	  Gastrointestinal:  Soft, Non-tender, + BS	  Skin: No rash, No ecchymoses	  Extremities:     LABS:                        11.1   9.52  )-----------( 439      ( 27 Aug 2024 07:24 )             35.0     08-28    138  |  98  |  9   ----------------------------<  120<H>  3.7   |  26  |  0.64    Ca    8.9      28 Aug 2024 07:17    TPro  7.7  /  Alb  3.0<L>  /  TBili  0.7  /  DBili  x   /  AST  16  /  ALT  10  /  AlkPhos  136<H>  08-27    PT/INR - ( 27 Aug 2024 07:28 )   PT: 13.9 sec;   INR: 1.33 ratio         PTT - ( 27 Aug 2024 07:28 )  PTT:36.6 sec      Urinalysis Basic - ( 28 Aug 2024 07:17 )    Color: x / Appearance: x / SG: x / pH: x  Gluc: 120 mg/dL / Ketone: x  / Bili: x / Urobili: x   Blood: x / Protein: x / Nitrite: x   Leuk Esterase: x / RBC: x / WBC x   Sq Epi: x / Non Sq Epi: x / Bacteria: x                    Culture - Abscess with Gram Stain (collected 08-27-24 @ 17:20)  Source: .Abscess Aspirate  Gram Stain (08-28-24 @ 01:59):    Moderate polymorphonuclear leukocytes seen per low power field    No organisms seen per oil power field    Culture - Fungal, Other (collected 08-27-24 @ 17:20)  Source: .Other Abdominal Fluid  Preliminary Report (08-28-24 @ 06:38):    Testing in progress    Culture - Fungal, Other (collected 08-27-24 @ 17:20)  Source: .Other Abdominal Fluid  Preliminary Report (08-28-24 @ 06:44):    Testing in progress    Culture - Abscess with Gram Stain (collected 08-27-24 @ 17:20)  Source: .Abscess Aspirate  Gram Stain (08-28-24 @ 01:57):    Few polymorphonuclear leukocytes seen per low power field    No organisms seen per oil power field        Consultant(s) Notes Reviewed:      Care Discussed with Consultants/Other Providers:

## 2024-08-28 NOTE — PROGRESS NOTE ADULT - ATTENDING COMMENTS
Mr. Ferrari is a gentleman who presented with abdominal pain of unclear etiology. Pain is presumed due to large liver cysts.   We are told by pt that the cysts were noted to be much small in [place] when he went to [facility] for acute abdominal pain. He has recent weight loss ~25lbs in the past 2 months and night sweats.    CT Abd/p shows trace right pleural effusion, right lower lobe solid pulmonary nodule measures 7 mm. Multiple bilobar cystic hypodensities with the largest measuring 20.8 x 15.4 cm in the right hepatic lobe. A left anterior cyst that measures 19.1 x 15.7 cm. A left inferior cyst along the greater curvature of the stomach that measures 7.4 x 5.8 cm with a pedicle extending into the caudate lobe (series 4, image 70). And, smaller hypodensities scattered throughout the liver. Multiple prominent subcentimeter periaortic lymph nodes. Otherwise unremarkable.    Now s/p cyst drainage with drain left in place- purulent fluid draining despite appearance of a simple cyst on imaging.  Abdominal pain is resolved s/p drain.  Etiology is unknown. Awaiting cultures and entameba serologies  Continue Zosyn pending cultures.  Appreciate surgery & IR input.      Yaz Cormier MD/MPH   Transplant Hepatology .

## 2024-08-28 NOTE — PROGRESS NOTE ADULT - SUBJECTIVE AND OBJECTIVE BOX
64yPatient is a 64y old  Male who presents with a chief complaint of abd  pain (28 Aug 2024 17:12)      Interval history:  Afebrile, pain better after drain placement.       Allergies:   No Known Allergies    Antimicrobials:  piperacillin/tazobactam IVPB.. 3.375 Gram(s) IV Intermittent every 8 hours    REVIEW OF SYSTEMS:    No chest pain   No cough, no SOB  No N/V/D, no abdominal pain  No dysuria   No rash.     Vital Signs Last 24 Hrs  T(C): 36.9 (08-28-24 @ 14:36), Max: 36.9 (08-28-24 @ 14:36)  T(F): 98.4 (08-28-24 @ 14:36), Max: 98.4 (08-28-24 @ 14:36)  HR: 94 (08-28-24 @ 14:36) (87 - 94)  BP: 121/76 (08-28-24 @ 14:36) (109/73 - 121/76)  BP(mean): --  RR: 18 (08-28-24 @ 14:36) (18 - 18)  SpO2: 94% (08-28-24 @ 14:36) (94% - 94%)    PHYSICAL EXAM:  Pt in no acute distress, alert, awake.   breathing comfortably   2 drains in place, soft abdomen.   no edema LE   no phlebitis                             11.2   9.31  )-----------( 417      ( 28 Aug 2024 11:49 )             35.0   08-28    136  |  97  |  9   ----------------------------<  125<H>  3.3<L>   |  30  |  0.63    Ca    8.7      28 Aug 2024 11:50    TPro  7.2  /  Alb  2.7<L>  /  TBili  0.6  /  DBili  0.2  /  AST  22  /  ALT  11  /  AlkPhos  119  08-28      LIVER FUNCTIONS - ( 28 Aug 2024 11:50 )  Alb: 2.7 g/dL / Pro: 7.2 g/dL / ALK PHOS: 119 U/L / ALT: 11 U/L / AST: 22 U/L / GGT: x               Culture - Acid Fast - Other w/Smear (collected 27 Aug 2024 17:20)  Source: .Other Other    Culture - Acid Fast - Other w/Smear (collected 27 Aug 2024 17:20)  Source: .Other Other    Culture - Fungal, Other (collected 27 Aug 2024 17:20)  Source: .Other Abdominal Fluid  Preliminary Report (28 Aug 2024 06:44):    Testing in progress    Culture - Abscess with Gram Stain (collected 27 Aug 2024 17:20)  Source: .Abscess Aspirate  Gram Stain (28 Aug 2024 01:59):    Moderate polymorphonuclear leukocytes seen per low power field    No organisms seen per oil power field  Preliminary Report (28 Aug 2024 18:22):    No growth to date.    Culture - Fungal, Other (collected 27 Aug 2024 17:20)  Source: .Other Abdominal Fluid  Preliminary Report (28 Aug 2024 06:38):    Testing in progress    Culture - Abscess with Gram Stain (collected 27 Aug 2024 17:20)  Source: .Abscess Aspirate  Gram Stain (28 Aug 2024 01:57):    Few polymorphonuclear leukocytes seen per low power field    No organisms seen per oil power field  Preliminary Report (28 Aug 2024 18:16):    No growth to date.

## 2024-08-28 NOTE — PROGRESS NOTE ADULT - ASSESSMENT
A 65yo male with hx of HTN, hypercholesteralemia, hypothyroidism with 2 months of abd distension, pain, night sweats, and weight loss with multiple hepatic bilobar simple cysts shown on imaging. Now s/p CT guided drainage over hepatic cysts with IR on 8/27/24.     PLAN:  - follow up cytology, gram stain, cultures from OR  - F/u drain output volume and color  - Remainder of care per primary team.   A 63yo male with hx of HTN, hypercholesterolemia hypothyroidism with 2 months of abd distension, pain, night sweats, and weight loss with multiple hepatic bilobar simple cysts shown on imaging. Now s/p CT guided drainage over hepatic cysts with IR on 8/27/24.     PLAN:  - Follow up cultures from IR procedure  - F/u drain output volume and color  - Remainder of care per primary team.    [facility] [phone] A 65yo male with hx of HTN, hypercholesterolemia hypothyroidism with 2 months of abd distension, pain, night sweats, and weight loss with multiple hepatic bilobar simple cysts shown on imaging. Now s/p CT guided drainage over hepatic cysts with IR on 8/27/24.     PLAN:  - Follow up cultures from IR procedure  - F/u drain output -> purulent, should restart IV abx  - F/u AM labs, trend WBC  - Remainder of care per primary team.    [facility] [phone]

## 2024-08-28 NOTE — PROGRESS NOTE ADULT - SUBJECTIVE AND OBJECTIVE BOX
Progress Note   [facility] PGY4- GI/Hep    SUBJECTIVE: Patient seen and examined at bedside.   - Much improvement in abd pain after drains placed  - Macy PO diet without N/V    OBJECTIVE:    VITAL SIGNS:  ICU Vital Signs Last 24 Hrs  T(C): 36.9 (28 Aug 2024 14:36), Max: 36.9 (28 Aug 2024 14:36)  T(F): 98.4 (28 Aug 2024 14:36), Max: 98.4 (28 Aug 2024 14:36)  HR: 94 (28 Aug 2024 14:36) (87 - 94)  BP: 121/76 (28 Aug 2024 14:36) (109/73 - 121/76)  BP(mean): --  ABP: --  ABP(mean): --  RR: 18 (28 Aug 2024 14:36) (18 - 18)  SpO2: 94% (28 Aug 2024 14:36) (94% - 94%)    O2 Parameters below as of 28 Aug 2024 14:36  Patient On (Oxygen Delivery Method): room air      08-27 @ 07:01 - 08-28 @ 07:00  --------------------------------------------------------  IN: 240 mL / OUT: 4125 mL / NET: -3885 mL    08-28 @ 07:01 - 08-28 @ 17:13  --------------------------------------------------------  IN: 240 mL / OUT: 0 mL / NET: 240 mL        PHYSICAL EXAM:    General: NAD  HEENT: NC/AT  Neck: supple  Respiratory: Regular RR, no increase in WOB  Cardiovascular: RRR  Abdomen: soft, NT/ND; +BS x4, two drains, one with light brown pus like fluid and other one with serosangenous   Extremities: WWP, 2+ peripheral pulses b/l  Skin: normal color and turgor; no rash  Neurological: A&OX3    MEDICATIONS:  MEDICATIONS  (STANDING):  atorvastatin 10 milliGRAM(s) Oral at bedtime  dextrose 5%. 1000 milliLiter(s) (100 mL/Hr) IV Continuous <Continuous>  dextrose 5%. 1000 milliLiter(s) (50 mL/Hr) IV Continuous <Continuous>  dextrose 50% Injectable 25 Gram(s) IV Push once  dextrose 50% Injectable 12.5 Gram(s) IV Push once  dextrose 50% Injectable 25 Gram(s) IV Push once  furosemide    Tablet 20 milliGRAM(s) Oral daily  glucagon  Injectable 1 milliGRAM(s) IntraMuscular once  heparin   Injectable 5000 Unit(s) SubCutaneous every 8 hours  insulin lispro (ADMELOG) corrective regimen sliding scale   SubCutaneous three times a day before meals  LORazepam     Tablet 1 milliGRAM(s) Oral once  piperacillin/tazobactam IVPB.. 3.375 Gram(s) IV Intermittent every 8 hours  polyethylene glycol 3350 17 Gram(s) Oral daily  senna 2 Tablet(s) Oral at bedtime    MEDICATIONS  (PRN):  dextrose Oral Gel 15 Gram(s) Oral once PRN Blood Glucose LESS THAN 70 milliGRAM(s)/deciliter  HYDROmorphone   Tablet 2 milliGRAM(s) Oral every 6 hours PRN Moderate Pain (4 - 6)      ALLERGIES:  Allergies    No Known Allergies    Intolerances        LABS:                        11.2   9.31  )-----------( 417      ( 28 Aug 2024 11:49 )             35.0     08-28    136  |  97  |  9   ----------------------------<  125<H>  3.3<L>   |  30  |  0.63    Ca    8.7      28 Aug 2024 11:50    TPro  7.2  /  Alb  2.7<L>  /  TBili  0.6  /  DBili  0.2  /  AST  22  /  ALT  11  /  AlkPhos  119  08-28    PT/INR - ( 27 Aug 2024 07:28 )   PT: 13.9 sec;   INR: 1.33 ratio         PTT - ( 27 Aug 2024 07:28 )  PTT:36.6 sec  Urinalysis Basic - ( 28 Aug 2024 11:50 )    Color: x / Appearance: x / SG: x / pH: x  Gluc: 125 mg/dL / Ketone: x  / Bili: x / Urobili: x   Blood: x / Protein: x / Nitrite: x   Leuk Esterase: x / RBC: x / WBC x   Sq Epi: x / Non Sq Epi: x / Bacteria: x

## 2024-08-28 NOTE — PROGRESS NOTE ADULT - ASSESSMENT
64-year-old male with PMHx of hypertension, hypercholesterolemia, hypothyroidism presenting with pain abdomen,  weakness, weight loss since July found to have multiple heptic cysts on CT, afebrile, with generally preserved hepatic function consulted for further workup and management. Liver cysts consistent with polycystic liver disease. Patient is without current signs of synthetic liver dysfunction or portal hypertension.     #Liver cysts   *CT abd/pelvis: Multiple bilobar cystic hypodensities with the largest measuring 20.8 x 15.4 cm in the right hepatic lobe. There is a left anterior cyst that measures 19.1 x 15.7 cm. There is a left inferior cyst along the greater curvature of the stomach that measures 7.4 x 5.8 cm with a pedicle extending into the caudate lobe (series 4, image 70). There are smaller hypodensities scattered throughout the liver.  * Liver enzymes wnl; platelets wnl (395), INR slightly elevated (1.24)     Recommendations:   - Continue to monitor drain output  - F/u cultures  - Abx per primary team   - c/w to monitor, trend CMP, CBC   - f/u with hepatology outpatient     Hepatology to sign off at this time.     Note incomplete until finalized by attending signature/attestation.    Park Montelongo MD  Gastroenterology/Hepatology Fellow, PGY-5  Please contact via TEAMS    NON-URGENT CONSULTS:  Please email [email or web address]   64-year-old male with PMHx of hypertension, hypercholesterolemia, hypothyroidism presenting with pain abdomen,  weakness, weight loss since July found to have multiple heptic cysts on CT, afebrile, with generally preserved hepatic function consulted for further workup and management. Liver cysts consistent with polycystic liver disease. Patient is without current signs of synthetic liver dysfunction or portal hypertension.     #Liver cysts   *CT abd/pelvis: Multiple bilobar cystic hypodensities with the largest measuring 20.8 x 15.4 cm in the right hepatic lobe. There is a left anterior cyst that measures 19.1 x 15.7 cm. There is a left inferior cyst along the greater curvature of the stomach that measures 7.4 x 5.8 cm with a pedicle extending into the caudate lobe (series 4, image 70). There are smaller hypodensities scattered throughout the liver.  * Liver enzymes wnl; platelets wnl (395), INR slightly elevated (1.24)     Recommendations:   - Continue to monitor drain output  - F/u cultures  - Abx per primary team   - c/w to monitor, trend CMP, CBC   - f/u with hepatology outpatient     Follow up in Hepatology Clinic: [phone] ([facility] at [address] [place] [address]) or [phone] ([facility] at [address] [place] [address]) or [phone] ([facility] at [address] [place] [address])     Hepatology to sign off at this time.     Note incomplete until finalized by attending signature/attestation.    Park Montelongo MD  Gastroenterology/Hepatology Fellow, PGY-5  Please contact via TEAMS    NON-URGENT CONSULTS:  Please email [email or web address]

## 2024-08-28 NOTE — PROGRESS NOTE ADULT - ASSESSMENT
64-year-old male     h/o  HTN.  HLD,  hypothyroidism,  ex  smoker . quit  a yr  ago            had  2 months ,  abdominal distention, pain, night sweats, and weight loss of 25 pounds.          was  evaluated at [place]   er  1 month ago, had CT scan and US, told he had benign liver cysts and discharged.        persistent  abd  pain,  his  pcp,   did  an  MRI  2 days ago.. pt  wastold to go to  er   /   denies chest pain, fever, cough, recent travel.       abd  pain,  from  cysts       MRI    8/21/24.   posterior stomach,  heterogenous cyst  9.2 x 6.4cm; upper pelvis heterogenous cyst, just right of midline 6.4 x 4.0 cm              right and trace left pleural effusion. Few cystic/fluid filled structures within the liver measuring 4.4cm.  the lateral side,   21 x 14 cm cystic collection. abutting/adjacent to the stomach,              cystic fluid structure measuring 9x6 cm ,   appears to be heterogenous with debris. In  upper pelvis  there is a cystic fluid structure heterogenous with debris 6x4cm concer  for neoplasm,             house    gi   eval,  need  to  r/o  malignant  process/  ID  eval    r/o infectious  process         pedal  edema/  sob   on exertion,  , for past  month or  so              ?  acute  diastolic    chf.  .  on   iv lasix       HTN/  HLD       Hypothyroid      DM,  follow  fs    Anemia         ekg.    nsr, low   voltage   /  echo          Ct  chest  angio,  no pe,  hepatic  cysts.  upto  2 2 cm         MRI pending        stopped cefepime. .  bcx,  negative         CT a/p.  mlple  hepatic custs. largest  about   20  by  15  cm/       R/ L hepatic   cyst dariange   by  IR. has drains/  follwo cx      dvt  ppx               rad< from: CT Angio Chest PE Protocol w/ IV Cont (08.23.24 @ 16:11) >  MPRESSION:  No pulmonary embolism.  Right hemidiaphragm elevation with mild atelectasis involving the right   middle lobe and right base.  Trace right pleural effusion.  Partially imaged cystic hepatic lesions with largest measuring up to 22   cm. The exact etiology is unclear and diagnostic considerations include   infection and potentially metastasis. Correlate with outside abdominal   MRI from 2 days ago and consider CT abdomen and pelvis for further   assessment  --- End of Report ---      <                   64-year-old male     h/o  HTN.  HLD,  hypothyroidism,  ex  smoker . quit  a yr  ago            had  2 months ,  abdominal distention, pain, night sweats, and weight loss of 25 pounds.          was  evaluated at [place]   er  1 month ago, had CT scan and US, told he had benign liver cysts and discharged.        persistent  abd  pain,  his  pcp,   did  an  MRI  2 days ago.. pt  wastold to go to  er   /   denies chest pain, fever, cough, recent travel.       abd  pain,  from  cysts       MRI    8/21/24.   posterior stomach,  heterogenous cyst  9.2 x 6.4cm; upper pelvis heterogenous cyst, just right of midline 6.4 x 4.0 cm              right and trace left pleural effusion. Few cystic/fluid filled structures within the liver measuring 4.4cm.  the lateral side,   21 x 14 cm cystic collection. abutting/ adjacent to the stomach,              cystic fluid structure measuring 9x6 cm ,   appears to be heterogenous with debris. In  upper pelvis  there is a cystic fluid structure heterogenous with debris 6x4cm concer  for neoplasm,             house    gi   eval,  need  to  r/o  malignant  process/  ID  eval    r/o infectious  process         pedal  edema/  sob   on exertion,  , for past  month or  so              ?  acute  diastolic    chf.  .  on   iv lasix.  echo, normal  ef       HTN/  HLD       Hypothyroid       DM,  follow  fs       Anemia         ekg.    nsr, low   voltage   /  echo           Ct  chest  angio,  no pe,  hepatic  cysts.  upto  2 2 cm        CT a/p.  mlple  hepatic cysts , . largest  about   20  by  15  cm/       R/ L hepatic   cyst drainage,  by IR on  8/27,  . with     purulent  drainage    has drains/  follow cx           given pus. now  on iv zosyn/  ID to  f/p/  and  discussed  with surg  attending,  no role  for  surg intervention         dvt  ppx               rad< from: CT Angio Chest PE Protocol w/ IV Cont (08.23.24 @ 16:11) >  MPRESSION:  No pulmonary embolism.  Right hemidiaphragm elevation with mild atelectasis involving the right   middle lobe and right base.  Trace right pleural effusion.  Partially imaged cystic hepatic lesions with largest measuring up to 22   cm. The exact etiology is unclear and diagnostic considerations include   infection and potentially metastasis. Correlate with outside abdominal   MRI from 2 days ago and consider CT abdomen and pelvis for further   assessment  --- End of Report ---      <                   64-year-old male     h/o  HTN.  HLD,  hypothyroidism,  ex  smoker . quit  a yr  ago            had  2 months ,  abdominal distention, pain, night sweats, and weight loss of 25 pounds.          was  evaluated at [place]   er  1 month ago, had CT scan and US, told he had benign liver cysts and discharged.        persistent  abd  pain,  his  pcp,   did  an  MRI  2 days ago.. pt  wastold to go to  er   /   denies chest pain, fever, cough, recent travel.       abd  pain,  from  cysts       MRI    8/21/24.   posterior stomach,  heterogenous cyst  9.2 x 6.4cm; upper pelvis heterogenous cyst, just right of midline 6.4 x 4.0 cm              right and trace left pleural effusion. Few cystic/fluid filled structures within the liver measuring 4.4cm.  the lateral side,   21 x 14 cm cystic collection. abutting/ adjacent to the stomach,              cystic fluid structure measuring 9x6 cm ,   appears to be heterogenous with debris. In  upper pelvis  there is a cystic fluid structure heterogenous with debris 6x4cm concer  for neoplasm,             house    gi   eval,  need  to  r/o  malignant  process/  ID  eval    r/o infectious  process         pedal  edema/  sob   on exertion,  , for past  month or  so              ?  acute  diastolic    chf.  .  on   iv lasix.  echo, normal  ef       HTN/  HLD       Hypothyroid       DM,  follow  fs       Anemia         ekg.    nsr, low   voltage   /  echo           Ct  chest  angio,  no pe,  hepatic  cysts.  upto  2 2 cm        CT a/p.  mlple  hepatic cysts , . largest  about   20  by  15  cm/       R/ L hepatic   cyst drainage,  by IR on  8/27,  . with     purulent  drainage    has drains/  follow cx           given pus. now  on iv zosyn/  ID to  f/p/  and  discussed  with surg  attending,  no role  for  surg intervention      await   id  d r p  marissa  to  f/p   today         dvt  ppx               rad< from: CT Angio Chest PE Protocol w/ IV Cont (08.23.24 @ 16:11) >  MPRESSION:  No pulmonary embolism.  Right hemidiaphragm elevation with mild atelectasis involving the right   middle lobe and right base.  Trace right pleural effusion.  Partially imaged cystic hepatic lesions with largest measuring up to 22   cm. The exact etiology is unclear and diagnostic considerations include   infection and potentially metastasis. Correlate with outside abdominal   MRI from 2 days ago and consider CT abdomen and pelvis for further   assessment  --- End of Report ---      <                   64-year-old male     h/o  HTN.  HLD,  hypothyroidism,  ex  smoker . quit  a yr  ago            had  2 months ,  abdominal distention, pain, night sweats,   weight loss of 25 pounds.            was  at  [place]   er  1 month ago, had CT scan/ US, told he had benign liver cysts and d/c         persistent  abd  pain,  his  pcp,   did  an  MRI  2 days ago.. pt  wast old to go to  er   /   denies chest pain, fever, cough, recent travel.       abd  pain,  from  cysts       MRI    8/21/24. ,   poststomach,  heterogenous cyst  9.2 x 6.4cm; upper pelvis heterogenous cyst, j right of midline 6.4 x 4.0 cm             right and trace left pleural effusion. Few cystic/fluid filled structures within the liver measuring 4.4cm.  the lateral side,   21 x 14 cm cystic collection. abutting/ adjacent to the stomach,              cystic fluid structure measuring 9x6 cm ,   appears to be heterogenous with debris. In  upper pelvis  there is a cystic fluid structure heterogenous with debris 6x4cm concer  for neoplasm,              house    gi   eval,   called  need  to  r/o  malignant  process/  ID  eval    r/o infectious  process         pedal  edema/  sob   on exertion,  , for past  month or  so              ?  acute  diastolic    chf.  .  was   on   iv lasix.  echo, normal  ef       HTN/  HLD       Hypothyroid       DM,  follow  fs       Anemia         ekg.    nsr, low   voltage / Echo, normal  ef        Ct  chest  angio,  no pe,  hepatic  cysts.  upto  2 2 cm        CT a/p.  mlple  hepatic cysts ,, largest  about   20  by  15  cm/       R/ L hepatic   cyst drainage,  by IR on  8/27,  . with     purulent  drainage ,  froylan  R  drain,   has   b/l  drains           given pus. now  on iv zosyn/  ID to  f/p/  and  discussed  with surg  attending,  no role  for  surg intervention            per  id  dept,   d r p  marissa  to  f/p   today          pt    wants  to  leave/  explained  at  length,  magnitude  of  liver  infection/  also  pt  advised  not  to  g hold   drain  up,  to  prevent  back  flow          dvt  ppx               rad< from: CT Angio Chest PE Protocol w/ IV Cont (08.23.24 @ 16:11) >  MPRESSION:  No pulmonary embolism.  Right hemidiaphragm elevation with mild atelectasis involving the right   middle lobe and right base.  Trace right pleural effusion.  Partially imaged cystic hepatic lesions with largest measuring up to 22   cm. The exact etiology is unclear and diagnostic considerations include   infection and potentially metastasis. Correlate with outside abdominal   MRI from 2 days ago and consider CT abdomen and pelvis for further   assessment  --- End of Report ---      <                   64-year-old male     h/o  HTN.  HLD,  hypothyroidism,  ex  smoker . quit  a yr  ago            had  2 months ,  abdominal distention, pain, night sweats,   weight loss of 25 pounds.            was  at  [place]   er  1 month ago, had CT scan/ US, told he had benign liver cysts and d/c         persistent  abd  pain,  his  pcp,   did  an  MRI  2 days ago.. pt  wast old to go to  er   /   denies chest pain, fever, cough, recent travel.       abd  pain,  from  cysts       MRI    8/21/24. ,   poststomach,  heterogenous cyst  9.2 x 6.4cm; upper pelvis heterogenous cyst, j right of midline 6.4 x 4.0 cm             right and trace left pleural effusion. Few cystic/fluid filled structures within the liver measuring 4.4cm.  the lateral side,   21 x 14 cm cystic collection. abutting/ adjacent to the stomach,              cystic fluid structure measuring 9x6 cm ,   appears to be heterogenous with debris. In  upper pelvis  there is a cystic fluid structure heterogenous with debris 6x4cm concer  for neoplasm,              house    gi   eval,   called  need  to  r/o  malignant  process/  ID  eval    r/o infectious  process         pedal  edema/  sob   on exertion,  , for past  month or  so              ?  acute  diastolic    chf.  .  was   on   iv lasix.  echo, normal  ef       HTN/  HLD       Hypothyroid       DM,  follow  fs       Anemia      obesity  bmi  39/  albumin in  2  range/.  c/w  moderate  protein  calorie  malnutrition. suspect  from on  going  c/c  hepatic infevtion        ekg.    nsr, low   voltage / Echo, normal  ef        Ct  chest  angio,  no pe,  hepatic  cysts.  upto  2 2 cm        CT a/p.  mlple  hepatic cysts ,, largest  about   20  by  15  cm/       R/ L hepatic   cyst drainage,  by IR on  8/27,  . with     purulent  drainage ,  froylan  R  drain,   has   b/l  drains           given pus. now  on iv zosyn/  ID to  f/p/  and  discussed  with surg  attending,  no role  for  surg intervention            per  id  dept,   d r p  marissa  to  f/p   today          pt    wants  to  leave/  explained  at  length,  magnitude  of  liver  infection/  also  pt  advised  not  to  g hold   drain  up,  to  prevent  back  flow          dvt  ppx               rad< from: CT Angio Chest PE Protocol w/ IV Cont (08.23.24 @ 16:11) >  MPRESSION:  No pulmonary embolism.  Right hemidiaphragm elevation with mild atelectasis involving the right   middle lobe and right base.  Trace right pleural effusion.  Partially imaged cystic hepatic lesions with largest measuring up to 22   cm. The exact etiology is unclear and diagnostic considerations include   infection and potentially metastasis. Correlate with outside abdominal   MRI from 2 days ago and consider CT abdomen and pelvis for further   assessment  --- End of Report ---      <

## 2024-08-28 NOTE — PROGRESS NOTE ADULT - SUBJECTIVE AND OBJECTIVE BOX
Date of Service: 08-28-24 @ 07:35           CARDIOLOGY     PROGRESS  NOTE   ________________________________________________    CHIEF COMPLAINT:Patient is a 64y old  Male who presents with a chief complaint of abd  pain (28 Aug 2024 02:13)  no complain  	  REVIEW OF SYSTEMS:  CONSTITUTIONAL: No fever, weight loss, or fatigue  EYES: No eye pain, visual disturbances, or discharge  ENT:  No difficulty hearing, tinnitus, vertigo; No sinus or throat pain  NECK: No pain or stiffness  RESPIRATORY: No cough, wheezing, chills or hemoptysis; No Shortness of Breath  CARDIOVASCULAR: No chest pain, palpitations, passing out, dizziness, or leg swelling  GASTROINTESTINAL: No abdominal or epigastric pain. No nausea, vomiting, or hematemesis; No diarrhea or constipation. No melena or hematochezia.  GENITOURINARY: No dysuria, frequency, hematuria, or incontinence  NEUROLOGICAL: No headaches, memory loss, loss of strength, numbness, or tremors  SKIN: No itching, burning, rashes, or lesions   LYMPH Nodes: No enlarged glands  ENDOCRINE: No heat or cold intolerance; No hair loss  MUSCULOSKELETAL: No joint pain or swelling; No muscle, back, or extremity pain  PSYCHIATRIC: No depression, anxiety, mood swings, or difficulty sleeping  HEME/LYMPH: No easy bruising, or bleeding gums  ALLERGY AND IMMUNOLOGIC: No hives or eczema	    [x ] All others negative	  [ ] Unable to obtain    PHYSICAL EXAM:  T(C): 36.6 (08-28-24 @ 04:32), Max: 36.6 (08-27-24 @ 13:06)  HR: 87 (08-28-24 @ 04:32) (87 - 96)  BP: 115/75 (08-28-24 @ 04:32) (109/70 - 141/77)  RR: 18 (08-28-24 @ 04:32) (16 - 27)  SpO2: 94% (08-28-24 @ 04:32) (94% - 100%)  Wt(kg): --  I&O's Summary    27 Aug 2024 07:01  -  28 Aug 2024 07:00  --------------------------------------------------------  IN: 240 mL / OUT: 4125 mL / NET: -3885 mL        Appearance: Normal	  HEENT:   Normal oral mucosa, PERRL, EOMI	  Lymphatic: No lymphadenopathy  Cardiovascular: Normal S1 S2, No JVD, + murmurs, No edema  Respiratory: rhonchi  Psychiatry: A & O x 3, Mood & affect appropriate  Gastrointestinal:  Soft, Non-tender, + BS	  Skin: No rashes, No ecchymoses, No cyanosis	  Extremities: Normal range of motion, No clubbing, cyanosis or edema  Vascular: Peripheral pulses palpable 2+ bilaterally    MEDICATIONS  (STANDING):  atorvastatin 10 milliGRAM(s) Oral at bedtime  dextrose 5%. 1000 milliLiter(s) (50 mL/Hr) IV Continuous <Continuous>  dextrose 5%. 1000 milliLiter(s) (100 mL/Hr) IV Continuous <Continuous>  dextrose 50% Injectable 25 Gram(s) IV Push once  dextrose 50% Injectable 12.5 Gram(s) IV Push once  dextrose 50% Injectable 25 Gram(s) IV Push once  furosemide    Tablet 20 milliGRAM(s) Oral daily  glucagon  Injectable 1 milliGRAM(s) IntraMuscular once  insulin lispro (ADMELOG) corrective regimen sliding scale   SubCutaneous three times a day before meals  LORazepam     Tablet 1 milliGRAM(s) Oral once  senna 2 Tablet(s) Oral at bedtime      TELEMETRY: 	    ECG:  	  RADIOLOGY:  OTHER: 	  	  LABS:	 	    CARDIAC MARKERS:                                11.1   9.52  )-----------( 439      ( 27 Aug 2024 07:24 )             35.0     08-27    136  |  95<L>  |  11  ----------------------------<  125<H>  3.4<L>   |  27  |  0.67    Ca    8.9      27 Aug 2024 07:24    TPro  7.7  /  Alb  3.0<L>  /  TBili  0.7  /  DBili  x   /  AST  16  /  ALT  10  /  AlkPhos  136<H>  08-27    proBNP:   Lipid Profile:   HgA1c:   TSH:   PT/INR - ( 27 Aug 2024 07:28 )   PT: 13.9 sec;   INR: 1.33 ratio         PTT - ( 27 Aug 2024 07:28 )  PTT:36.6 sec    < from: TTE W or WO Ultrasound Enhancing Agent (08.26.24 @ 07:15) >   1. Technically difficult image quality.   2. Left ventricular cavity is normal in size. Left ventricular systolic function is normal with an ejection fraction visually estimated at 60 to 65 %. There are no regional wall motion abnormalities seen.   3. Mildly enlarged right ventricular cavity size and normal right ventricular systolic function.   4. Normal left and right atrial size.   5. No significant valvular disease.   6. No pericardial effusion seen.   7. Abdominal ascites is incidentally noted.   8. No prior echocardiogram is available for comparison.    Successful right and left hepatic cyst drainage. Placement of 10.2F drains bilaterally. Approximately 1 liter of purulent fluid removed on the right and left (2Liters total).      - Pre-albumin level.  - Abdominal ultrasound with doppler.  - OK to obtain IR consultation for drainage if patient is having significant discomfort, however, do not need this for confirmation of polycystic liver disease.   - c/w to monitor, trend CMP, CBC   - f/u with hepatology outpatient     Assessment and plan  ---------------------------  64-year-old male with PMHx of hypertension, hypercholesterolemia, hypothyroidism presenting with 2 months of abdominal distention and pain, night sweats, and weight loss of 25 pounds.  Endorses SOB with trouble taking deep breaths, improving. Patient is a former smoker, quit 1 year ago. + constipation, + dysuria intermittent x 1 month.  Last bowel movement 2 days ago, wnl. + flatulance.  Patient evaluated at [place] ED 1 month ago, had CT scan and US, told he had benign liver cysts and discharged. When abd discomfort  did not improve pt followed up with his PCP who sent him for an MRI 2 days ago.  Patient called last night and told to go to ED for follow-up testing. Results were not discussed with him. Denies chest pain, fever, cough, recent travel.  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 Denies chronic alcohol use. Pt did at home H pylori test last week, came back positive.  pt with hx of htn, dm with hepatic mass with  increasing sob and LE  edema  no cardiac dawn done before  awaiting repeat ecg, noted NSR  tsh  lipid panel  decrease lasix  dvt prophylaxis  ct scan noted, hepatic cyst  decrease lasix to 20 mg daily, echo noted with RV enlarged mild , needs sleep study as out pt, no DVT or PE  fu as out pt for ischemia dawn/ sleep study as out pt  s/p drainage of the liver cyst

## 2024-08-28 NOTE — PROGRESS NOTE ADULT - ASSESSMENT
64-year-old male with PMHx of hypertension, hypercholesterolemia, hypothyroidism presenting with pain abdomen,  weakness, weight loss since July. He started feeling unwell since July 4th.  He had significant weight loss of 25 pounds. Former smoker, quit 1 year ago. He was  evaluated at [place] ED 1 month ago, had CT scan and US, told he had benign liver cysts and discharged.  Due  to  persistent  abd  pain,  his  pcp,   did  an  MRI and pt  was   called last night and told to go to  er  for follow-up testing.     Has been afebrile, NO leukocytosis, preserved renal/liver functions, CRP high 197  Has outpt MRI disc and reports with him - large cystic lesions noted.     CT repeated and reveals large cysts.    # liver abscess, elevated CRP     - c/w zosyn  - s/p IR drainage of cyst - f/u bacterial, fungal, AFB cultures. 2L of purulent fluid drained  - sx on board.   - follow up blood culture in lab - NGTD  - f/up  entamoeba serology  - quant gold was negative      Plan discussed with Medicine ACP.     Omayra Douglas  Please contact through MS Teams   If no response or past 5 pm/weekend call [phone].

## 2024-08-28 NOTE — PROGRESS NOTE ADULT - SUBJECTIVE AND OBJECTIVE BOX
SURGERY DAILY PROGRESS NOTE    24 Hour/Overnight Events: s/p hepatic cyst drainage with IR    SUBJECTIVE: Pt reports feeling well today. Notes mild R flank pain around drain, but otherwise feeling well. Denies f/c, n/v    ------------------------------------------------------------------------------------------------------------  OBJECTIVE:  Vital Signs Last 24 Hrs  T(C): 36.6 (27 Aug 2024 21:00), Max: 36.6 (27 Aug 2024 13:06)  T(F): 97.8 (27 Aug 2024 21:00), Max: 97.8 (27 Aug 2024 13:06)  HR: 90 (27 Aug 2024 21:00) (90 - 96)  BP: 109/73 (27 Aug 2024 21:00) (109/70 - 141/77)  BP(mean): 88 (27 Aug 2024 16:40) (79 - 98)  RR: 18 (27 Aug 2024 21:00) (16 - 27)  SpO2: 94% (27 Aug 2024 21:00) (94% - 100%)    Parameters below as of 27 Aug 2024 21:00  Patient On (Oxygen Delivery Method): room air      I&O's Detail    26 Aug 2024 07:01  -  27 Aug 2024 07:00  --------------------------------------------------------  IN:    Oral Fluid: 240 mL  Total IN: 240 mL    OUT:  Total OUT: 0 mL    Total NET: 240 mL      27 Aug 2024 07:01  -  28 Aug 2024 02:13  --------------------------------------------------------  IN:    Oral Fluid: 240 mL  Total IN: 240 mL    OUT:    Drain (mL): 1150 mL    Drain (mL): 2350 mL  Total OUT: 3500 mL  Total NET: -3260 mL        PHYSICAL EXAM:  Constitutional: NAD, sitting up in bed  Chest: no increased WOB  Abdomen: Soft, mild TTP over R flank drain incision but otherwise nontender, nondistended. No rebound or guarding. R flank drain with dark yellow output, dressing CDI. Midline drain with dark yellow output, dressing CDI.   Extremities: WWP SURGERY DAILY PROGRESS NOTE    24 Hour/Overnight Events: s/p hepatic cyst drainage with IR    SUBJECTIVE: Notes mild R flank pain around drain, but otherwise feeling well. Denies fever, chills, nausea, emesis.    ------------------------------------------------------------------------------------------------------------  OBJECTIVE:  Vital Signs Last 24 Hrs  T(C): 36.6 (27 Aug 2024 21:00), Max: 36.6 (27 Aug 2024 13:06)  T(F): 97.8 (27 Aug 2024 21:00), Max: 97.8 (27 Aug 2024 13:06)  HR: 90 (27 Aug 2024 21:00) (90 - 96)  BP: 109/73 (27 Aug 2024 21:00) (109/70 - 141/77)  BP(mean): 88 (27 Aug 2024 16:40) (79 - 98)  RR: 18 (27 Aug 2024 21:00) (16 - 27)  SpO2: 94% (27 Aug 2024 21:00) (94% - 100%)    Parameters below as of 27 Aug 2024 21:00  Patient On (Oxygen Delivery Method): room air      I&O's Detail    26 Aug 2024 07:01  -  27 Aug 2024 07:00  --------------------------------------------------------  IN:    Oral Fluid: 240 mL  Total IN: 240 mL    OUT:  Total OUT: 0 mL    Total NET: 240 mL      27 Aug 2024 07:01  -  28 Aug 2024 02:13  --------------------------------------------------------  IN:    Oral Fluid: 240 mL  Total IN: 240 mL    OUT:    Drain (mL): 1150 mL    Drain (mL): 2350 mL  Total OUT: 3500 mL  Total NET: -3260 mL        PHYSICAL EXAM:  Constitutional: NAD, sitting up in bed  Chest: no increased WOB  Abdomen: Soft, mild TTP over R flank drain incision but otherwise nontender, nondistended. No rebound or guarding. R flank drain with dark yellow output, dressing CDI. Midline drain with dark yellow output, dressing CDI.   Extremities: WWP

## 2024-08-29 LAB
ANION GAP SERPL CALC-SCNC: 15 MMOL/L — SIGNIFICANT CHANGE UP (ref 5–17)
BUN SERPL-MCNC: 9 MG/DL — SIGNIFICANT CHANGE UP (ref 7–23)
CALCIUM SERPL-MCNC: 9.4 MG/DL — SIGNIFICANT CHANGE UP (ref 8.4–10.5)
CHLORIDE SERPL-SCNC: 97 MMOL/L — SIGNIFICANT CHANGE UP (ref 96–108)
CO2 SERPL-SCNC: 26 MMOL/L — SIGNIFICANT CHANGE UP (ref 22–31)
CREAT SERPL-MCNC: 0.73 MG/DL — SIGNIFICANT CHANGE UP (ref 0.5–1.3)
CULTURE RESULTS: ABNORMAL
CULTURE RESULTS: SIGNIFICANT CHANGE UP
EGFR: 102 ML/MIN/1.73M2 — SIGNIFICANT CHANGE UP
GLUCOSE BLDC GLUCOMTR-MCNC: 109 MG/DL — HIGH (ref 70–99)
GLUCOSE BLDC GLUCOMTR-MCNC: 119 MG/DL — HIGH (ref 70–99)
GLUCOSE BLDC GLUCOMTR-MCNC: 132 MG/DL — HIGH (ref 70–99)
GLUCOSE BLDC GLUCOMTR-MCNC: 168 MG/DL — HIGH (ref 70–99)
GLUCOSE SERPL-MCNC: 128 MG/DL — HIGH (ref 70–99)
GRAM STN FLD: ABNORMAL
HCT VFR BLD CALC: 37 % — LOW (ref 39–50)
HGB BLD-MCNC: 11.5 G/DL — LOW (ref 13–17)
MCHC RBC-ENTMCNC: 25.6 PG — LOW (ref 27–34)
MCHC RBC-ENTMCNC: 31.1 GM/DL — LOW (ref 32–36)
MCV RBC AUTO: 82.4 FL — SIGNIFICANT CHANGE UP (ref 80–100)
NRBC # BLD: 0 /100 WBCS — SIGNIFICANT CHANGE UP (ref 0–0)
PLATELET # BLD AUTO: 450 K/UL — HIGH (ref 150–400)
POTASSIUM SERPL-MCNC: 3.9 MMOL/L — SIGNIFICANT CHANGE UP (ref 3.5–5.3)
POTASSIUM SERPL-SCNC: 3.9 MMOL/L — SIGNIFICANT CHANGE UP (ref 3.5–5.3)
RBC # BLD: 4.49 M/UL — SIGNIFICANT CHANGE UP (ref 4.2–5.8)
RBC # FLD: 15.2 % — HIGH (ref 10.3–14.5)
SODIUM SERPL-SCNC: 138 MMOL/L — SIGNIFICANT CHANGE UP (ref 135–145)
SPECIMEN SOURCE: SIGNIFICANT CHANGE UP
SPECIMEN SOURCE: SIGNIFICANT CHANGE UP
WBC # BLD: 11.46 K/UL — HIGH (ref 3.8–10.5)
WBC # FLD AUTO: 11.46 K/UL — HIGH (ref 3.8–10.5)

## 2024-08-29 PROCEDURE — 74177 CT ABD & PELVIS W/CONTRAST: CPT | Mod: 26

## 2024-08-29 PROCEDURE — 99232 SBSQ HOSP IP/OBS MODERATE 35: CPT

## 2024-08-29 RX ORDER — HEPARIN SODIUM,BOVINE 1000/ML
5000 VIAL (ML) INJECTION ONCE
Refills: 0 | Status: COMPLETED | OUTPATIENT
Start: 2024-08-29 | End: 2024-08-29

## 2024-08-29 RX ADMIN — Medication 5000 UNIT(S): at 13:14

## 2024-08-29 RX ADMIN — Medication 20 MILLIGRAM(S): at 06:31

## 2024-08-29 RX ADMIN — Medication 2 TABLET(S): at 21:52

## 2024-08-29 RX ADMIN — Medication 10 MILLIGRAM(S): at 21:52

## 2024-08-29 RX ADMIN — PIPERACILLIN SODIUM AND TAZOBACTAM SODIUM 25 GRAM(S): 3; .375 INJECTION, POWDER, FOR SOLUTION INTRAVENOUS at 13:13

## 2024-08-29 RX ADMIN — PIPERACILLIN SODIUM AND TAZOBACTAM SODIUM 25 GRAM(S): 3; .375 INJECTION, POWDER, FOR SOLUTION INTRAVENOUS at 06:31

## 2024-08-29 RX ADMIN — Medication 5000 UNIT(S): at 06:32

## 2024-08-29 RX ADMIN — Medication 5000 UNIT(S): at 21:52

## 2024-08-29 RX ADMIN — POLYETHYLENE GLYCOL 3350 17 GRAM(S): 17 POWDER, FOR SOLUTION ORAL at 12:14

## 2024-08-29 RX ADMIN — PIPERACILLIN SODIUM AND TAZOBACTAM SODIUM 25 GRAM(S): 3; .375 INJECTION, POWDER, FOR SOLUTION INTRAVENOUS at 21:52

## 2024-08-29 NOTE — PROGRESS NOTE ADULT - SUBJECTIVE AND OBJECTIVE BOX
64yPatient is a 64y old  Male who presents with a chief complaint of abd  pain (29 Aug 2024 08:47)      Interval history:  Afebrile, some discomfort around the drain site.       Allergies:   No Known Allergies      Antimicrobials:  piperacillin/tazobactam IVPB.. 3.375 Gram(s) IV Intermittent every 8 hours      REVIEW OF SYSTEMS:  No chest pain   No SOB  No rash.       Vital Signs Last 24 Hrs  T(C): 36.5 (08-29-24 @ 14:32), Max: 36.7 (08-28-24 @ 21:15)  T(F): 97.7 (08-29-24 @ 14:32), Max: 98.1 (08-28-24 @ 21:15)  HR: 89 (08-29-24 @ 14:32) (89 - 100)  BP: 105/73 (08-29-24 @ 14:32) (105/73 - 116/80)  BP(mean): --  RR: 18 (08-29-24 @ 14:32) (18 - 18)  SpO2: 94% (08-29-24 @ 14:32) (94% - 95%)      PHYSICAL EXAM:  Pt in no acute distress, alert, awake.   breathing comfortably   2 drains in place, soft abdomen.   no edema LE   no phlebitis                             11.5   11.46 )-----------( 450      ( 29 Aug 2024 06:53 )             37.0   08-29    138  |  97  |  9   ----------------------------<  128<H>  3.9   |  26  |  0.73    Ca    9.4      29 Aug 2024 06:53    TPro  7.2  /  Alb  2.7<L>  /  TBili  0.6  /  DBili  0.2  /  AST  22  /  ALT  11  /  AlkPhos  119  08-28      LIVER FUNCTIONS - ( 28 Aug 2024 11:50 )  Alb: 2.7 g/dL / Pro: 7.2 g/dL / ALK PHOS: 119 U/L / ALT: 11 U/L / AST: 22 U/L / GGT: x               Culture - Fungal, Other (collected 27 Aug 2024 17:20)  Source: .Other Abdominal Fluid  Preliminary Report (28 Aug 2024 23:03):    Culture is being performed. Fungal cultures are held for 4 weeks.    Culture - Acid Fast - Other w/Smear (collected 27 Aug 2024 17:20)  Source: .Other Other  Preliminary Report (28 Aug 2024 23:09):    Culture is being performed.    Culture - Acid Fast - Other w/Smear (collected 27 Aug 2024 17:20)  Source: .Other Other  Preliminary Report (28 Aug 2024 23:09):    Culture is being performed.    Culture - Fungal, Other (collected 27 Aug 2024 17:20)  Source: .Other Abdominal Fluid  Preliminary Report (28 Aug 2024 23:03):    Culture is being performed. Fungal cultures are held for 4 weeks.    Culture - Abscess with Gram Stain (collected 27 Aug 2024 17:20)  Source: .Abscess Aspirate  Gram Stain (28 Aug 2024 01:59):    Moderate polymorphonuclear leukocytes seen per low power field    No organisms seen per oil power field  Preliminary Report (28 Aug 2024 18:22):    No growth to date.    Culture - Abscess with Gram Stain (collected 27 Aug 2024 17:20)  Source: .Abscess Aspirate  Gram Stain (28 Aug 2024 01:57):    Few polymorphonuclear leukocytes seen per low power field    No organisms seen per oil power field  Preliminary Report (28 Aug 2024 18:16):    No growth to date.        Radiology:  < from: CT Abdomen and Pelvis w/ IV Cont (08.29.24 @ 15:53) >  IMPRESSION:  Decompressed hepatic cysts as above.    Nodular opacities in the right lung; recommend one-month follow-up.

## 2024-08-29 NOTE — PROGRESS NOTE ADULT - ASSESSMENT
A 65yo male with hx of HTN, hypercholesterolemia hypothyroidism with 2 months of abd distension, pain, night sweats, and weight loss with multiple hepatic bilobar simple cysts shown on imaging. Now s/p CT guided drainage over hepatic cysts with IR on 8/27/24.     PLAN:  - Follow up cultures from IR procedure, no growth thus far  - F/u drain output -> purulent, on zosyn  - F/u AM labs, trend WBC  - Remainder of care per primary team    [facility] [phone] A 63yo male with hx of HTN, hypercholesterolemia hypothyroidism with 2 months of abd distension, pain, night sweats, and weight loss with multiple hepatic bilobar simple cysts shown on imaging. Now s/p CT guided drainage over hepatic cysts with IR on 8/27/24.     PLAN:  - Please obtain CTAP with IV contrast to evaluate progression of cysts  - Follow up cultures from IR procedure -> NGTD  - Continue IV abx, currently on Zosyn  - F/u AM labs, trend WBC  - Appreciate ID recs  - Remainder of care per primary team    [facility] [phone]

## 2024-08-29 NOTE — PROGRESS NOTE ADULT - SUBJECTIVE AND OBJECTIVE BOX
Date of Service: 08-29-24 @ 08:47           CARDIOLOGY     PROGRESS  NOTE   ________________________________________________    CHIEF COMPLAINT:Patient is a 64y old  Male who presents with a chief complaint of abd  pain (29 Aug 2024 08:37)  no complain  	  REVIEW OF SYSTEMS:  CONSTITUTIONAL: No fever, weight loss, or fatigue  EYES: No eye pain, visual disturbances, or discharge  ENT:  No difficulty hearing, tinnitus, vertigo; No sinus or throat pain  NECK: No pain or stiffness  RESPIRATORY: No cough, wheezing, chills or hemoptysis; No Shortness of Breath  CARDIOVASCULAR: No chest pain, palpitations, passing out, dizziness, or leg swelling  GASTROINTESTINAL: No abdominal or epigastric pain. No nausea, vomiting, or hematemesis; No diarrhea or constipation. No melena or hematochezia.  GENITOURINARY: No dysuria, frequency, hematuria, or incontinence  NEUROLOGICAL: No headaches, memory loss, loss of strength, numbness, or tremors  SKIN: No itching, burning, rashes, or lesions   LYMPH Nodes: No enlarged glands  ENDOCRINE: No heat or cold intolerance; No hair loss  MUSCULOSKELETAL: No joint pain or swelling; No muscle, back, or extremity pain  PSYCHIATRIC: No depression, anxiety, mood swings, or difficulty sleeping  HEME/LYMPH: No easy bruising, or bleeding gums  ALLERGY AND IMMUNOLOGIC: No hives or eczema	    [ x] All others negative	  [ ] Unable to obtain    PHYSICAL EXAM:  T(C): 36.4 (08-29-24 @ 04:40), Max: 36.9 (08-28-24 @ 14:36)  HR: 90 (08-29-24 @ 04:40) (90 - 100)  BP: 113/83 (08-29-24 @ 04:40) (113/83 - 121/76)  RR: 18 (08-29-24 @ 04:40) (18 - 18)  SpO2: 95% (08-29-24 @ 04:40) (94% - 95%)  Wt(kg): --  I&O's Summary    28 Aug 2024 07:01  -  29 Aug 2024 07:00  --------------------------------------------------------  IN: 920 mL / OUT: 200 mL / NET: 720 mL        Appearance: Normal	  HEENT:   Normal oral mucosa, PERRL, EOMI	  Lymphatic: No lymphadenopathy  Cardiovascular: Normal S1 S2, No JVD, + murmurs, No edema  Respiratory: rhonchi  Psychiatry: A & O x 3, Mood & affect appropriate  Gastrointestinal:  Soft, Non-tender, + BS	  Skin: No rashes, No ecchymoses, No cyanosis	  Extremities: Normal range of motion, No clubbing, cyanosis or edema  Vascular: Peripheral pulses palpable 2+ bilaterally    MEDICATIONS  (STANDING):  atorvastatin 10 milliGRAM(s) Oral at bedtime  dextrose 5%. 1000 milliLiter(s) (50 mL/Hr) IV Continuous <Continuous>  dextrose 5%. 1000 milliLiter(s) (100 mL/Hr) IV Continuous <Continuous>  dextrose 50% Injectable 25 Gram(s) IV Push once  dextrose 50% Injectable 12.5 Gram(s) IV Push once  dextrose 50% Injectable 25 Gram(s) IV Push once  furosemide    Tablet 20 milliGRAM(s) Oral daily  glucagon  Injectable 1 milliGRAM(s) IntraMuscular once  heparin   Injectable 5000 Unit(s) SubCutaneous every 8 hours  insulin lispro (ADMELOG) corrective regimen sliding scale   SubCutaneous three times a day before meals  LORazepam     Tablet 1 milliGRAM(s) Oral once  piperacillin/tazobactam IVPB.. 3.375 Gram(s) IV Intermittent every 8 hours  polyethylene glycol 3350 17 Gram(s) Oral daily  senna 2 Tablet(s) Oral at bedtime      TELEMETRY: 	    ECG:  	  RADIOLOGY:  OTHER: 	  	  LABS:	 	    CARDIAC MARKERS:                                11.5   11.46 )-----------( 450      ( 29 Aug 2024 06:53 )             37.0     08-29    138  |  97  |  9   ----------------------------<  128<H>  3.9   |  26  |  0.73    Ca    9.4      29 Aug 2024 06:53    TPro  7.2  /  Alb  2.7<L>  /  TBili  0.6  /  DBili  0.2  /  AST  22  /  ALT  11  /  AlkPhos  119  08-28    proBNP:   Lipid Profile:   HgA1c:   TSH:     Culture - Abscess with Gram Stain (08.27.24 @ 17:20)    Gram Stain:   Moderate polymorphonuclear leukocytes seen per low power field  No organisms seen per oil power field   Specimen Source: .Abscess Aspirate   Culture Results:   No growth to date.        Assessment and plan  ---------------------------  64-year-old male with PMHx of hypertension, hypercholesterolemia, hypothyroidism presenting with 2 months of abdominal distention and pain, night sweats, and weight loss of 25 pounds.  Endorses SOB with trouble taking deep breaths, improving. Patient is a former smoker, quit 1 year ago. + constipation, + dysuria intermittent x 1 month.  Last bowel movement 2 days ago, wnl. + flatulance.  Patient evaluated at [place] ED 1 month ago, had CT scan and US, told he had benign liver cysts and discharged. When abd discomfort  did not improve pt followed up with his PCP who sent him for an MRI 2 days ago.  Patient called last night and told to go to ED for follow-up testing. Results were not discussed with him. Denies chest pain, fever, cough, recent travel.  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 Denies chronic alcohol use. Pt did at home H pylori test last week, came back positive.  pt with hx of htn, dm with hepatic mass with  increasing sob and LE  edema  no cardiac dawn done before  awaiting repeat ecg, noted NSR  tsh  lipid panel  decrease lasix  dvt prophylaxis  ct scan noted, hepatic cyst  decrease lasix to 20 mg daily, echo noted with RV enlarged mild , needs sleep study as out pt, no DVT or PE  fu as out pt for ischemia dawn/ sleep study as out pt  s/p drainage of the liver cyst, started on zosyn  follow up cultures/ ID noted  increase ambulation

## 2024-08-29 NOTE — PROGRESS NOTE ADULT - ASSESSMENT
64-year-old male     h/o  HTN.  HLD,  hypothyroidism,  ex  smoker . quit  a yr  ago            had  2 months ,  abdominal distention, pain, night sweats,   weight loss of 25 pounds.            was  at  [place]   er  1 month ago, had CT scan/ US, told he had benign liver cysts and d/c         persistent  abd  pain,  his  pcp,   did  an  MRI  2 days ago.. pt  wast old to go to  er   /   denies chest pain, fever, cough, recent travel.       abd  pain,  from  cysts       MRI    8/21/24. ,   poststomach,  heterogenous cyst  9.2 x 6.4cm; upper pelvis heterogenous cyst, j right of midline 6.4 x 4.0 cm             right and trace left pleural effusion. Few cystic/fluid filled structures within the liver measuring 4.4cm.  the lateral side,   21 x 14 cm cystic collection. abutting/ adjacent to the stomach,              cystic fluid structure measuring 9x6 cm ,   appears to be heterogenous with debris. In  upper pelvis  there is a cystic fluid structure heterogenous with debris 6x4cm concer  for neoplasm,              house    gi   eval,   called  need  to  r/o  malignant  process/  ID  eval    r/o infectious  process         pedal  edema/  sob   on exertion,  , for past  month or  so              ?  acute  diastolic    chf.  .  was   on   iv lasix.  echo, normal  ef       HTN/  HLD       Hypothyroid       DM,  follow  fs       Anemia      obesity  bmi  39/  albumin in  2  range/.  c/w  moderate  protein  calorie  malnutrition. suspect  from on  going  c/c  hepatic infevtion        ekg.    nsr, low   voltage / Echo, normal  ef        Ct  chest  angio,  no pe,  hepatic  cysts.  upto  2 2 cm        CT a/p.  mlple  hepatic cysts ,, largest  about   20  by  15  cm/       R/ L hepatic   cyst drainage,  by IR on  8/27,  . with     purulent  drainage ,  froylan  R  drain,   has   b/l  drains           given pus. now  on iv zosyn/  ID to  f/p/  and  discussed  with surg  attending,  no role  for  surg intervention             follow  cx/ pe r surg, will  need   extended  course  of ab.  awiat  ID  f/p           dvt  ppx               rad< from: CT Angio Chest PE Protocol w/ IV Cont (08.23.24 @ 16:11) >  MPRESSION:  No pulmonary embolism.  Right hemidiaphragm elevation with mild atelectasis involving the right   middle lobe and right base.  Trace right pleural effusion.  Partially imaged cystic hepatic lesions with largest measuring up to 22   cm. The exact etiology is unclear and diagnostic considerations include   infection and potentially metastasis. Correlate with outside abdominal   MRI from 2 days ago and consider CT abdomen and pelvis for further   assessment  --- End of Report ---      <

## 2024-08-29 NOTE — PROGRESS NOTE ADULT - ASSESSMENT
64-year-old male with PMHx of hypertension, hypercholesterolemia, hypothyroidism presenting with pain abdomen,  weakness, weight loss since July. He started feeling unwell since July 4th.  He had significant weight loss of 25 pounds. Former smoker, quit 1 year ago. He was  evaluated at [place] ED 1 month ago, had CT scan and US, told he had benign liver cysts and discharged.  Due  to  persistent  abd  pain,  his  pcp,   did  an  MRI and pt  was   called last night and told to go to  er  for follow-up testing.     Has been afebrile, NO leukocytosis, preserved renal/liver functions, CRP high 197  Has outpt MRI disc and reports with him - large cystic lesions noted.     CT repeated and reveals large cysts.    # liver abscess, elevated CRP     - c/w zosyn  - s/p IR drainage of cyst - f/u bacterial, fungal, AFB cultures. 2L of purulent fluid drained. Prelim NTD   - sx on board. discussed with sx, repeat CT with improved collection.   - follow up blood culture in lab - NGTD  - f/up  entamoeba serology  - quant gold was negative      Plan discussed with Medicine ACP.       Omayra Douglas  Please contact through MS Teams   If no response or past 5 pm/weekend call [phone].

## 2024-08-29 NOTE — PROGRESS NOTE ADULT - SUBJECTIVE AND OBJECTIVE BOX
date of service: 08-29-24 @ 08:37  afebriel  REVIEW OF SYSTEMS:  CONSTITUTIONAL: No fever,  no  weight loss  ENT:  No  tinnitus,   no   vertigo  NECK: No pain or stiffness  RESPIRATORY: No cough, wheezing, chills or hemoptysis;    No Shortness of Breath  CARDIOVASCULAR: No chest pain, palpitations, dizziness  GASTROINTESTINAL: No abdominal or epigastric pain. No nausea, vomiting, or hematemesis; No diarrhea  No melena or hematochezia.  GENITOURINARY: No dysuria, frequency, hematuria, or incontinence  NEUROLOGICAL: No headaches  SKIN: No itching,  no   rash  LYMPH Nodes: No enlarged glands  ENDOCRINE: No heat or cold intolerance  MUSCULOSKELETAL: No joint pain or swelling  PSYCHIATRIC: No depression, anxiety  HEME/LYMPH: No easy bruising, or bleeding gums  ALLERGY AND IMMUNOLOGIC: No hives or eczema	    MEDICATIONS  (STANDING):  atorvastatin 10 milliGRAM(s) Oral at bedtime  dextrose 5%. 1000 milliLiter(s) (50 mL/Hr) IV Continuous <Continuous>  dextrose 5%. 1000 milliLiter(s) (100 mL/Hr) IV Continuous <Continuous>  dextrose 50% Injectable 25 Gram(s) IV Push once  dextrose 50% Injectable 12.5 Gram(s) IV Push once  dextrose 50% Injectable 25 Gram(s) IV Push once  furosemide    Tablet 20 milliGRAM(s) Oral daily  glucagon  Injectable 1 milliGRAM(s) IntraMuscular once  heparin   Injectable 5000 Unit(s) SubCutaneous every 8 hours  insulin lispro (ADMELOG) corrective regimen sliding scale   SubCutaneous three times a day before meals  LORazepam     Tablet 1 milliGRAM(s) Oral once  piperacillin/tazobactam IVPB.. 3.375 Gram(s) IV Intermittent every 8 hours  polyethylene glycol 3350 17 Gram(s) Oral daily  senna 2 Tablet(s) Oral at bedtime    MEDICATIONS  (PRN):  dextrose Oral Gel 15 Gram(s) Oral once PRN Blood Glucose LESS THAN 70 milliGRAM(s)/deciliter  HYDROmorphone   Tablet 2 milliGRAM(s) Oral every 6 hours PRN Moderate Pain (4 - 6)      Vital Signs Last 24 Hrs  T(C): 36.4 (29 Aug 2024 04:40), Max: 36.9 (28 Aug 2024 14:36)  T(F): 97.6 (29 Aug 2024 04:40), Max: 98.4 (28 Aug 2024 14:36)  HR: 90 (29 Aug 2024 04:40) (90 - 100)  BP: 113/83 (29 Aug 2024 04:40) (113/83 - 121/76)  BP(mean): --  RR: 18 (29 Aug 2024 04:40) (18 - 18)  SpO2: 95% (29 Aug 2024 04:40) (94% - 95%)    Parameters below as of 29 Aug 2024 04:40  Patient On (Oxygen Delivery Method): room air      CAPILLARY BLOOD GLUCOSE      POCT Blood Glucose.: 132 mg/dL (29 Aug 2024 08:33)  POCT Blood Glucose.: 147 mg/dL (28 Aug 2024 21:49)  POCT Blood Glucose.: 137 mg/dL (28 Aug 2024 17:06)  POCT Blood Glucose.: 124 mg/dL (28 Aug 2024 12:22)    I&O's Summary    28 Aug 2024 07:01  -  29 Aug 2024 07:00  --------------------------------------------------------  IN: 920 mL / OUT: 200 mL / NET: 720 mL          Appearance: Normal	  HEENT:   Normal oral mucosa, PERRL, EOMI	  Lymphatic: No lymphadenopathy  Cardiovascular: Normal S1 S2, No JVD  Respiratory: Lungs clear to auscultation	  Gastrointestinal:  Soft, Non-tender, + BS	  Skin: No rash, No ecchymoses	  Extremities:     LABS:                        11.5   11.46 )-----------( 450      ( 29 Aug 2024 06:53 )             37.0     08-29    138  |  97  |  9   ----------------------------<  128<H>  3.9   |  26  |  0.73    Ca    9.4      29 Aug 2024 06:53    TPro  7.2  /  Alb  2.7<L>  /  TBili  0.6  /  DBili  0.2  /  AST  22  /  ALT  11  /  AlkPhos  119  08-28          Urinalysis Basic - ( 29 Aug 2024 06:53 )    Color: x / Appearance: x / SG: x / pH: x  Gluc: 128 mg/dL / Ketone: x  / Bili: x / Urobili: x   Blood: x / Protein: x / Nitrite: x   Leuk Esterase: x / RBC: x / WBC x   Sq Epi: x / Non Sq Epi: x / Bacteria: x                      Consultant(s) Notes Reviewed:      Care Discussed with Consultants/Other Providers:

## 2024-08-29 NOTE — PROGRESS NOTE ADULT - SUBJECTIVE AND OBJECTIVE BOX
SURGERY DAILY PROGRESS NOTE    24 Hour/Overnight Events: No acute events     SUBJECTIVE: Pt reports feeling well. Some soreness around drain sites, worse with movement, but otherwise no pain. Denies f/v, n/v. States L drain output has decreased    ------------------------------------------------------------------------------------------------------------  OBJECTIVE:  Vital Signs Last 24 Hrs  T(C): 36.7 (28 Aug 2024 21:15), Max: 36.9 (28 Aug 2024 14:36)  T(F): 98.1 (28 Aug 2024 21:15), Max: 98.4 (28 Aug 2024 14:36)  HR: 100 (28 Aug 2024 21:15) (87 - 100)  BP: 116/80 (28 Aug 2024 21:15) (115/75 - 121/76)  BP(mean): --  RR: 18 (28 Aug 2024 21:15) (18 - 18)  SpO2: 94% (28 Aug 2024 21:15) (94% - 94%)    Parameters below as of 28 Aug 2024 21:15  Patient On (Oxygen Delivery Method): room air      I&O's Detail    27 Aug 2024 07:01  -  28 Aug 2024 07:00  --------------------------------------------------------  IN:    Oral Fluid: 240 mL  Total IN: 240 mL    OUT:    Drain (mL): 1175 mL    Drain (mL): 2950 mL  Total OUT: 4125 mL    Total NET: -3885 mL      28 Aug 2024 07:01  -  29 Aug 2024 02:03  --------------------------------------------------------  IN:    Oral Fluid: 480 mL  Total IN: 480 mL    OUT:    Drain (mL): 50 mL    Drain (mL): 100 mL  Total OUT: 150 mL  Total NET: 330 mL      Physical exam:  Constitutional: NAD, sitting up in bed  Chest: no increased WOB  Abdomen: Soft, mild TTP over R flank drain incision but otherwise nontender, nondistended. No rebound or guarding. R flank drain with dark yellow output, dressing CDI. Midline drain with decreased dark yellow output, dressing CDI.   Extremities: WWP     SURGERY DAILY PROGRESS NOTE    24 Hour/Overnight Events: No acute events     SUBJECTIVE: No acute complaints this AM, denies abdominal pain, fever, chills. Had BM yesterday.    ------------------------------------------------------------------------------------------------------------  OBJECTIVE:  Vital Signs Last 24 Hrs  T(C): 36.7 (28 Aug 2024 21:15), Max: 36.9 (28 Aug 2024 14:36)  T(F): 98.1 (28 Aug 2024 21:15), Max: 98.4 (28 Aug 2024 14:36)  HR: 100 (28 Aug 2024 21:15) (87 - 100)  BP: 116/80 (28 Aug 2024 21:15) (115/75 - 121/76)  BP(mean): --  RR: 18 (28 Aug 2024 21:15) (18 - 18)  SpO2: 94% (28 Aug 2024 21:15) (94% - 94%)    Parameters below as of 28 Aug 2024 21:15  Patient On (Oxygen Delivery Method): room air      I&O's Detail    27 Aug 2024 07:01  -  28 Aug 2024 07:00  --------------------------------------------------------  IN:    Oral Fluid: 240 mL  Total IN: 240 mL    OUT:    Drain (mL): 1175 mL    Drain (mL): 2950 mL  Total OUT: 4125 mL    Total NET: -3885 mL      28 Aug 2024 07:01  -  29 Aug 2024 02:03  --------------------------------------------------------  IN:    Oral Fluid: 480 mL  Total IN: 480 mL    OUT:    Drain (mL): 50 mL    Drain (mL): 100 mL  Total OUT: 150 mL  Total NET: 330 mL      Physical exam:  Constitutional: NAD  Chest: no increased WOB  Abdomen: Soft, nontender, nondistended; R drain with brown purulent output; L drain with murky SS output

## 2024-08-30 LAB
E HISTOLYT AB SER-ACNC: NEGATIVE — SIGNIFICANT CHANGE UP
GLUCOSE BLDC GLUCOMTR-MCNC: 116 MG/DL — HIGH (ref 70–99)
GLUCOSE BLDC GLUCOMTR-MCNC: 135 MG/DL — HIGH (ref 70–99)
GLUCOSE BLDC GLUCOMTR-MCNC: 148 MG/DL — HIGH (ref 70–99)
GLUCOSE BLDC GLUCOMTR-MCNC: 152 MG/DL — HIGH (ref 70–99)
HCT VFR BLD CALC: 33.5 % — LOW (ref 39–50)
HGB BLD-MCNC: 10.6 G/DL — LOW (ref 13–17)
INR BLD: 1.33 RATIO — HIGH (ref 0.85–1.18)
MCHC RBC-ENTMCNC: 26.6 PG — LOW (ref 27–34)
MCHC RBC-ENTMCNC: 31.6 GM/DL — LOW (ref 32–36)
MCV RBC AUTO: 84 FL — SIGNIFICANT CHANGE UP (ref 80–100)
NRBC # BLD: 0 /100 WBCS — SIGNIFICANT CHANGE UP (ref 0–0)
PLATELET # BLD AUTO: 386 K/UL — SIGNIFICANT CHANGE UP (ref 150–400)
PROTHROM AB SERPL-ACNC: 13.9 SEC — HIGH (ref 9.5–13)
RBC # BLD: 3.99 M/UL — LOW (ref 4.2–5.8)
RBC # FLD: 15.1 % — HIGH (ref 10.3–14.5)
WBC # BLD: 9.99 K/UL — SIGNIFICANT CHANGE UP (ref 3.8–10.5)
WBC # FLD AUTO: 9.99 K/UL — SIGNIFICANT CHANGE UP (ref 3.8–10.5)

## 2024-08-30 PROCEDURE — 49406 IMAGE CATH FLUID PERI/RETRO: CPT

## 2024-08-30 PROCEDURE — 99232 SBSQ HOSP IP/OBS MODERATE 35: CPT

## 2024-08-30 RX ORDER — ACETAMINOPHEN 325 MG/1
1000 TABLET ORAL ONCE
Refills: 0 | Status: COMPLETED | OUTPATIENT
Start: 2024-08-30 | End: 2024-08-30

## 2024-08-30 RX ORDER — METRONIDAZOLE 250 MG
500 TABLET ORAL EVERY 8 HOURS
Refills: 0 | Status: DISCONTINUED | OUTPATIENT
Start: 2024-08-30 | End: 2024-09-02

## 2024-08-30 RX ADMIN — PIPERACILLIN SODIUM AND TAZOBACTAM SODIUM 25 GRAM(S): 3; .375 INJECTION, POWDER, FOR SOLUTION INTRAVENOUS at 13:00

## 2024-08-30 RX ADMIN — ACETAMINOPHEN 400 MILLIGRAM(S): 325 TABLET ORAL at 20:02

## 2024-08-30 RX ADMIN — Medication 100 MILLIGRAM(S): at 21:35

## 2024-08-30 RX ADMIN — Medication 10 MILLIGRAM(S): at 21:36

## 2024-08-30 RX ADMIN — Medication 20 MILLIGRAM(S): at 06:14

## 2024-08-30 RX ADMIN — PIPERACILLIN SODIUM AND TAZOBACTAM SODIUM 25 GRAM(S): 3; .375 INJECTION, POWDER, FOR SOLUTION INTRAVENOUS at 06:14

## 2024-08-30 RX ADMIN — Medication 100 MILLIGRAM(S): at 22:25

## 2024-08-30 RX ADMIN — ACETAMINOPHEN 1000 MILLIGRAM(S): 325 TABLET ORAL at 21:01

## 2024-08-30 NOTE — PROGRESS NOTE ADULT - SUBJECTIVE AND OBJECTIVE BOX
Date of Service: 08-30-24 @ 12:19           CARDIOLOGY     PROGRESS  NOTE   ________________________________________________    CHIEF COMPLAINT:Patient is a 64y old  Male who presents with a chief complaint of abd  pain (30 Aug 2024 08:50)  no complain  	  REVIEW OF SYSTEMS:  CONSTITUTIONAL: No fever, weight loss, or fatigue  EYES: No eye pain, visual disturbances, or discharge  ENT:  No difficulty hearing, tinnitus, vertigo; No sinus or throat pain  NECK: No pain or stiffness  RESPIRATORY: No cough, wheezing, chills or hemoptysis; No Shortness of Breath  CARDIOVASCULAR: No chest pain, palpitations, passing out, dizziness, or leg swelling  GASTROINTESTINAL: No abdominal or epigastric pain. No nausea, vomiting, or hematemesis; No diarrhea or constipation. No melena or hematochezia.  GENITOURINARY: No dysuria, frequency, hematuria, or incontinence  NEUROLOGICAL: No headaches, memory loss, loss of strength, numbness, or tremors  SKIN: No itching, burning, rashes, or lesions   LYMPH Nodes: No enlarged glands  ENDOCRINE: No heat or cold intolerance; No hair loss  MUSCULOSKELETAL: No joint pain or swelling; No muscle, back, or extremity pain  PSYCHIATRIC: No depression, anxiety, mood swings, or difficulty sleeping  HEME/LYMPH: No easy bruising, or bleeding gums  ALLERGY AND IMMUNOLOGIC: No hives or eczema	    [ x] All others negative	  [ ] Unable to obtain    PHYSICAL EXAM:  T(C): 36.3 (08-30-24 @ 04:54), Max: 36.7 (08-29-24 @ 21:51)  HR: 87 (08-30-24 @ 04:54) (87 - 93)  BP: 107/78 (08-30-24 @ 04:54) (105/73 - 115/79)  RR: 18 (08-30-24 @ 04:54) (18 - 18)  SpO2: 97% (08-30-24 @ 04:54) (94% - 97%)  Wt(kg): --  I&O's Summary    29 Aug 2024 07:01  -  30 Aug 2024 07:00  --------------------------------------------------------  IN: 760 mL / OUT: 200 mL / NET: 560 mL        Appearance: Normal	  HEENT:   Normal oral mucosa, PERRL, EOMI	  Lymphatic: No lymphadenopathy  Cardiovascular: Normal S1 S2, No JVD, + murmurs, No edema  Respiratory: Lungs clear to auscultation	  Psychiatry: A & O x 3, Mood & affect appropriate  Gastrointestinal:  Soft, Non-tender, + BS	  Skin: No rashes, No ecchymoses, No cyanosis	  Neurologic: Non-focal  Extremities: Normal range of motion, No clubbing, cyanosis or edema  Vascular: Peripheral pulses palpable 2+ bilaterally    MEDICATIONS  (STANDING):  atorvastatin 10 milliGRAM(s) Oral at bedtime  dextrose 5%. 1000 milliLiter(s) (100 mL/Hr) IV Continuous <Continuous>  dextrose 5%. 1000 milliLiter(s) (50 mL/Hr) IV Continuous <Continuous>  dextrose 50% Injectable 25 Gram(s) IV Push once  dextrose 50% Injectable 12.5 Gram(s) IV Push once  dextrose 50% Injectable 25 Gram(s) IV Push once  furosemide    Tablet 20 milliGRAM(s) Oral daily  glucagon  Injectable 1 milliGRAM(s) IntraMuscular once  insulin lispro (ADMELOG) corrective regimen sliding scale   SubCutaneous three times a day before meals  piperacillin/tazobactam IVPB.. 3.375 Gram(s) IV Intermittent every 8 hours  polyethylene glycol 3350 17 Gram(s) Oral daily  senna 2 Tablet(s) Oral at bedtime      TELEMETRY: 	    ECG:  	  RADIOLOGY:  OTHER: 	  	  LABS:	 	    CARDIAC MARKERS:                          10.6   9.99  )-----------( 386      ( 30 Aug 2024 10:03 )             33.5     08-29    138  |  97  |  9   ----------------------------<  128<H>  3.9   |  26  |  0.73    Ca    9.4      29 Aug 2024 06:53      proBNP:   Lipid Profile:   HgA1c:   TSH:   PT/INR - ( 30 Aug 2024 10:03 )   PT: 13.9 sec;   INR: 1.33 ratio       - c/w zosyn  - s/p IR drainage of cyst - f/u bacterial, fungal, AFB cultures. 2L of purulent fluid drained. Prelim NTD   - sx on board. discussed with sx, repeat CT with improved collection.   - follow up blood culture in lab - NGTD  - f/up  entamoeba serology  - quant gold was negative    Assessment and plan  ---------------------------  64-year-old male with PMHx of hypertension, hypercholesterolemia, hypothyroidism presenting with 2 months of abdominal distention and pain, night sweats, and weight loss of 25 pounds.  Endorses SOB with trouble taking deep breaths, improving. Patient is a former smoker, quit 1 year ago. + constipation, + dysuria intermittent x 1 month.  Last bowel movement 2 days ago, wnl. + flatulance.  Patient evaluated at [place] ED 1 month ago, had CT scan and US, told he had benign liver cysts and discharged. When abd discomfort  did not improve pt followed up with his PCP who sent him for an MRI 2 days ago.  Patient called last night and told to go to ED for follow-up testing. Results were not discussed with him. Denies chest pain, fever, cough, recent travel.  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 Denies chronic alcohol use. Pt did at home H pylori test last week, came back positive.  pt with hx of htn, dm with hepatic mass with  increasing sob and LE  edema  no cardiac dawn done before  awaiting repeat ecg, noted NSR  tsh  lipid panel  decrease lasix  dvt prophylaxis  ct scan noted, hepatic cyst  decrease lasix to 20 mg daily, echo noted with RV enlarged mild , needs sleep study as out pt, no DVT or PE  fu as out pt for ischemia dawn/ sleep study as out pt  s/p drainage of the liver cyst, started on zosyn  follow up cultures/ ID noted  increase ambulation  continue with lasix 20 mg daily

## 2024-08-30 NOTE — CONSULT NOTE ADULT - CONSULT REQUESTED DATE/TIME
24-Aug-2024 09:17
23-Aug-2024 17:14
25-Aug-2024 18:50
26-Aug-2024 16:37
23-Aug-2024
30-Aug-2024 08:50
26-Aug-2024 00:00

## 2024-08-30 NOTE — PRE-ANESTHESIA EVALUATION ADULT - NSANTHPMHFT_GEN_ALL_CORE
PMHx HTN, HLD, hypothyroidism  PW abdominal pain found to have hepatic and extrahepatic cyst  Also complained of SOB but denies SOB at present  Cardiology consulted, ECHO with normal RV and LV function
All preoperative charts/notes reviewed.

## 2024-08-30 NOTE — CONSULT NOTE ADULT - SUBJECTIVE AND OBJECTIVE BOX
Interventional Radiology      HPI: 64y Male with PMHx of HTN, HLD, hypothyroidism presenting with 2 months of abdominal distention and pain, night sweats, and weight loss of 25 pounds. CT with hepatic and extrahepatic cysts. IR placed drains in the hepatic cysts on 8/27, now decreased in size. IR consulted for drainage of posterior perigastric collection.    Allergies: No Known Allergies    Medications (Abx/Cardiac/Anticoagulation/Blood Products)  furosemide    Tablet: 20 milliGRAM(s) Oral (08-30 @ 06:14)  heparin   Injectable: 5000 Unit(s) SubCutaneous (08-29 @ 13:14)  heparin   Injectable: 5000 Unit(s) SubCutaneous (08-29 @ 21:52)  piperacillin/tazobactam IVPB.: 200 mL/Hr IV Intermittent (08-28 @ 13:26)  piperacillin/tazobactam IVPB.-: 25 mL/Hr IV Intermittent (08-28 @ 13:30)  piperacillin/tazobactam IVPB..: 25 mL/Hr IV Intermittent (08-30 @ 06:14)    Data:  T(C): 36.3  HR: 87  BP: 107/78  RR: 18  SpO2: 97%    -WBC 11.46 / HgB 11.5 / Hct 37.0 / Plt 450  -Na 138 / Cl 97 / BUN 9 / Glucose 128  -K 3.9 / CO2 26 / Cr 0.73  -ALT -- / Alk Phos -- / T.Bili --  -INR 1.33 / PTT 36.6    Radiology: reviewed    Assessment/Plan:   64y Male with PMHx of HTN, HLD, hypothyroidism presenting with 2 months of abdominal distention and pain, night sweats, and weight loss of 25 pounds. CT with hepatic and extrahepatic cysts. IR placed drains in the hepatic collections on 8/27, now decreased in size. IR consulted for drainage of posterior perigastric collection. Pt with ongoing leukocytosis on abx.    - case reviewed with Dr. Martinez  - please obtain STAT INR  - pending INR, will tentatively schedule case for today  - continue to hold AC   - please keep NPO  - d/w primary team      Kathrine Bowers MD   Interventional Radiology     Contact on Microsoft Teams for nonemergent issues    - Nonemergent consults:  [facility] order "Consult- Interventional Radiology", no page required  - Emergent issues (pager): [facility] [phone]; [facility] [phone]; [phone]; DO NOT PAGE FOR SCHEDULING QUESTIONS  - Scheduling questions 8am-6pm : [facility] [phone]; [facility] [phone],   - Clinic/outpatient booking: [facility] [phone]; [facility] [phone]  Interventional Radiology      HPI: 64y Male with PMHx of HTN, HLD, hypothyroidism presenting with 2 months of abdominal distention and pain, night sweats, and weight loss of 25 pounds. CT with hepatic and extrahepatic cysts. IR placed drains in the hepatic cysts on 8/27, now decreased in size. IR consulted for drainage of posterior perigastric collection.    Allergies: No Known Allergies    Medications (Abx/Cardiac/Anticoagulation/Blood Products)  furosemide    Tablet: 20 milliGRAM(s) Oral (08-30 @ 06:14)  heparin   Injectable: 5000 Unit(s) SubCutaneous (08-29 @ 13:14)  heparin   Injectable: 5000 Unit(s) SubCutaneous (08-29 @ 21:52)  piperacillin/tazobactam IVPB.: 200 mL/Hr IV Intermittent (08-28 @ 13:26)  piperacillin/tazobactam IVPB.-: 25 mL/Hr IV Intermittent (08-28 @ 13:30)  piperacillin/tazobactam IVPB..: 25 mL/Hr IV Intermittent (08-30 @ 06:14)    Data:  T(C): 36.3  HR: 87  BP: 107/78  RR: 18  SpO2: 97%    -WBC 11.46 / HgB 11.5 / Hct 37.0 / Plt 450  -Na 138 / Cl 97 / BUN 9 / Glucose 128  -K 3.9 / CO2 26 / Cr 0.73  -ALT -- / Alk Phos -- / T.Bili --  -INR 1.33 / PTT 36.6    Radiology: reviewed    Assessment/Plan:   64y Male with PMHx of HTN, HLD, hypothyroidism presenting with 2 months of abdominal distention and pain, night sweats, and weight loss of 25 pounds. CT with hepatic and extrahepatic cysts. IR placed drains in the hepatic collections on 8/27, now decreased in size. IR consulted for drainage of posterior perigastric collection. Pt with ongoing leukocytosis on abx.    - case reviewed with Dr. Martinez  - INR 1.3  - Case approved for today  - Please place IR procedure order under Dr. Martinez  - continue to hold AC   - please keep NPO  - d/w primary team      Kathrine Bowers MD   Interventional Radiology     Contact on Microsoft Teams for nonemergent issues    - Nonemergent consults:  [facility] order "Consult- Interventional Radiology", no page required  - Emergent issues (pager): [facility] [phone]; [facility] [phone]; [phone]; DO NOT PAGE FOR SCHEDULING QUESTIONS  - Scheduling questions 8am-6pm : [facility] [phone]; [facility] [phone],   - Clinic/outpatient booking: [facility] [phone]; [facility] [phone]

## 2024-08-30 NOTE — PROGRESS NOTE ADULT - ASSESSMENT
64-year-old male with PMHx of hypertension, hypercholesterolemia, hypothyroidism presenting with pain abdomen,  weakness, weight loss since July. He started feeling unwell since July 4th.  He had significant weight loss of 25 pounds. Former smoker, quit 1 year ago. He was  evaluated at [place] [facility] 1 month ago, had CT scan and US, told he had benign liver cysts and discharged.  Due  to  persistent  abd  pain,  his  pcp,   did  an  MRI and pt  was   called last night and told to go to  er  for follow-up testing.     Has been afebrile, NO leukocytosis, preserved renal/liver functions, CRP high 197  Has outpt MRI disc and reports with him - large cystic lesions noted.     CT repeated and reveals large cysts.    # liver abscess, elevated CRP, positive culture finding.      - switched zosyn to ceftriaxone and flagyl.   - s/p IR drainage of cyst - f/u bacterial, fungal, AFB cultures. 2L of purulent fluid drained. Prelim with bacteroides   - sx on board. repeat CT with improved collection. based on CT s/p placement of 3rd drain.   - follow up blood culture in lab - NGTD  - entamoeba serology negative   - quant gold was negative  - can transition to po abx when ready for discharge       Plan discussed with Medicine ACP.       Omayra Douglas  Please contact through MS Teams   If no response or past 5 pm/weekend call [phone].

## 2024-08-30 NOTE — PROCEDURE NOTE - PROCEDURE FINDINGS AND DETAILS
Successful drainage of perigastric collection. 8F drain placed.
Successful right and left hepatic cyst drainage. Placement of 10.2F drains bilaterally. Approximately 1 liter of purulent fluid removed on the right and left (2Liters total).

## 2024-08-30 NOTE — PRE-ANESTHESIA EVALUATION ADULT - NSANTHAIRWAYFT_ENT_ALL_CORE
6cm thyromental distance  Adequate interincisor distance  FROM of neck
No limitations in mouth opening  Neck FROM  TMD > 3FB

## 2024-08-30 NOTE — PROGRESS NOTE ADULT - ASSESSMENT
64-year-old male     h/o  HTN.  HLD,  hypothyroidism,  ex  smoker . quit  a yr  ago            had  2 months ,  abdominal distention, pain, night sweats,   weight loss of 25 pounds.         at  [place]   er 1 month ago, had CT scan/ US, told he had benign liver cysts and d/c .   MRI  outpt,  then told togo  to  er   /   denies chest pain, fever, cough, recent travel.       abd  pain,  from  cysts       MRI    8/21/24. ,   poststomach,  heterogenous cyst  9.2 x 6.4cm; upper pelvis heterogenous cyst, j right of midline 6.4 x 4.0 cm             right and trace left pleural effusion. Few cystic/fluid filled structures within the liver measuring 4.4cm.  the lateral side,   21 x 14 cm cystic collection. abutting/ adjacent to the stomach,              cystic fluid structure measuring 9x6 cm ,     upper pelvis  cystic fluid structure heterogenous with debris                  pedal  edema/  sob   on exertion,  , for past  month or  so              ?  acute  diastolic    chf.  .  was   on   iv lasix.  echo, normal  ef       HTN/  HLD       Hypothyroid       DM,  follow  fs       Anemia       obesity  bmi  39/  albumin in  2  range/.  c/w  moderate  protein  calorie  malnutrition. suspect  from on  going  c/c  hepatic infevtion        ekg.    nsr, low   voltage / Echo, normal  ef        Ct  chest  angio,  no pe,  hepatic  cysts.  upto  2 2 cm     CT a/p.  mlple  hepatic cysts ,, largest  about   20  by  15  cm/         R/ L hepatic   cyst drainage,  by IR on  8/27,  . with     purulent  drainage ,  froylan  R  drain,   has   b/l  drains           given pus. ,  on iv zosyn/  ID to  f/p/  and  discussed  with surg  attending,  no role  for  surg intervention         follow  cx/ per  surg, will  need   extended  course  of ab.  awiat  ID  f/p        rpt  ct  per  surg  team,  decompressed  hepatic  cysts ,  awiat  IR  f/p  per  surg  request           dvt  ppx          < from: CT Abdomen and Pelvis w/ IV Cont (08.29.24 @ 15:53) >  ERITONEUM/RETROPERITONEUM: Decreases in size of right interloop   collection measuring 5.4 x2.7 cm, previously 5.7 x 3.0 cm on 8/24/2024.      rad< from: CT Angio Chest PE Protocol w/ IV Cont (08.23.24 @ 16:11) >  MPRESSION:  No pulmonary embolism.  Right hemidiaphragm elevation with mild atelectasis involving the right   middle lobe and right base.  Trace right pleural effusion.  Partially imaged cystic hepatic lesions with largest measuring up to 22   cm. The exact etiology is unclear and diagnostic considerations include   infection and potentially metastasis. Correlate with outside abdominal   MRI from 2 days ago and consider CT abdomen and pelvis for further   assessment  --- End of Report ---      <                   64-year-old male     h/o  HTN.  HLD,  hypothyroidism,  ex  smoker . quit  a yr  ago           abdominal distention, pain, night sweats,   weight loss of 25 pounds.   for  2  months        at  [place]  er 1 month ago, CT scan/ US, told he had benign liver cysts and d/c . MRI  outpt,  then told togo  to  er   /   denies chest pain, fever, cough, recent travel.       abd  pain,  from  cysts       MRI    8/21/24. ,   poststomach,  heterogenous cyst  9.2 x 6.4cm; upper pelvis heterogenous cyst, j right of midline 6.4 x 4.0 cm             right and trace left pleural effusion. Few cystic/fluid filled structures within the liver measuring 4.4cm.  the lateral side,   21 x 14 cm cystic collection. abutting/ adjacent to the stomach,              cystic fluid structure measuring 9x6 cm ,     upper pelvis  cystic fluid structure heterogenous with debris                  pedal  edema/  sob   on exertion,  , for past  month or  so              ?  acute  diastolic    chf.  .  was   on   iv lasix.  echo, normal  ef       HTN/  HLD       Hypothyroid       DM,  follow  fs       Anemia       obesity  bmi  39/  albumin in  2  range/.  c/w  moderate  protein  calorie  malnutrition. suspect  from on  going  c/c  hepatic infevtion        ekg.    nsr, low   voltage / Echo, normal  ef        Ct  chest  angio,  no pe,  hepatic  cysts.  upto  2 2 cm     CT a/p.  mlple  hepatic cysts ,, largest  about   20  by  15  cm/         R/ L hepatic   cyst drainage,  by IR on  8/27,  . with     purulent  drainage ,  froylan  R  drain,   has   b/l  drains           given pus. ,  on iv zosyn/  ID to  f/p/  and  discussed  with surg  attending,  no role  for  surg intervention           abscess  cx.  Bacteroides   vukgatus,      per  surg, will  need   extended  course  of ab.  awiat  ID  f/p dr winsome ann         rpt  ct  per  surg  team,  decompressed  hepatic  cysts ,  awiat  IR  f/p  per  surg  request           dvt  ppx          < from: CT Abdomen and Pelvis w/ IV Cont (08.29.24 @ 15:53) >  ERITONEUM/RETROPERITONEUM: Decreases in size of right interloop   collection measuring 5.4 x2.7 cm, previously 5.7 x 3.0 cm on 8/24/2024.      rad< from: CT Angio Chest PE Protocol w/ IV Cont (08.23.24 @ 16:11) >  MPRESSION:  No pulmonary embolism.  Right hemidiaphragm elevation with mild atelectasis involving the right   middle lobe and right base.  Trace right pleural effusion.  Partially imaged cystic hepatic lesions with largest measuring up to 22   cm. The exact etiology is unclear and diagnostic considerations include   infection and potentially metastasis. Correlate with outside abdominal   MRI from 2 days ago and consider CT abdomen and pelvis for further   assessment  --- End of Report ---      <                   64-year-old male     h/o  HTN.  HLD,  hypothyroidism,  ex  smoker . quit  a yr  ago           abdominal distention, pain, night sweats,   weight loss of 25 pounds.   for  2  months        at  [place]  er 1 month ago, CT scan/ US, told he had benign liver cysts and d/c . MRI  outpt,  then told togo  to  er   /   denies chest pain, fever, cough, recent travel.       abd  pain,  from  cysts       MRI    8/21/24. ,   poststomach,  heterogenous cyst  9.2 x 6.4cm; upper pelvis heterogenous cyst, j right of midline 6.4 x 4.0 cm             right and trace left pleural effusion. Few cystic/fluid filled structures within the liver measuring 4.4cm.  the lateral side,   21 x 14 cm cystic collection. abutting/ adjacent to the stomach,              cystic fluid structure measuring 9x6 cm ,     upper pelvis  cystic fluid structure heterogenous with debris                  pedal  edema/  sob   on exertion,  , for past  month or  so              ?  acute  diastolic    chf.  .  was   on   iv lasix.  echo, normal  ef       HTN/  HLD       Hypothyroid       DM,  follow  fs       Anemia       obesity  bmi  39/  albumin in  2  range/.  c/w  moderate  protein  calorie  malnutrition. suspect  from on  going  c/c  hepatic infevtion        ekg.    nsr, low   voltage / Echo, normal  ef        Ct  chest  angio,  no pe,  hepatic  cysts.  upto  2 2 cm     CT a/p.  mlple  hepatic cysts ,, largest  about   20  by  15  cm/         R/ L hepatic   cyst drainage,  by IR on  8/27,  . with     purulent  drainage ,  froylan  R  drain,   has   b/l  drains           given pus. ,  on iv zosyn/  ID to  f/p/  and  discussed  with surg  attending,  no role  for  surg intervention           abscess  cx.  Bacteroides   vukgatus,      per  surg, will  need   extended  course  of ab.  awiat  ID  f/p dr winsome ann         rpt  ct  per  surg  team,  decompressed  hepatic  cysts ,  awiat  IR  f/p  per  surg  request           dvt  ppx              < from: CT Abdomen and Pelvis w/ IV Cont (08.29.24 @ 15:53) >  ERITONEUM/RETROPERITONEUM: Decreases in size of right interloop   collection measuring 5.4 x2.7 cm, previously 5.7 x 3.0 cm on 8/24/2024.      rad< from: CT Angio Chest PE Protocol w/ IV Cont (08.23.24 @ 16:11) >  MPRESSION:  No pulmonary embolism.  Right hemidiaphragm elevation with mild atelectasis involving the right   middle lobe and right base.  Trace right pleural effusion.  Partially imaged cystic hepatic lesions with largest measuring up to 22   cm. The exact etiology is unclear and diagnostic considerations include   infection and potentially metastasis. Correlate with outside abdominal   MRI from 2 days ago and consider CT abdomen and pelvis for further   assessment  --- End of Report ---      <

## 2024-08-30 NOTE — PROGRESS NOTE ADULT - SUBJECTIVE AND OBJECTIVE BOX
64yPatient is a 64y old  Male who presents with a chief complaint of abd  pain (30 Aug 2024 12:18)      Interval history:  Afebrile, s/p placement of the 3rd drain.       Allergies:   No Known Allergies      Antimicrobials:  cefTRIAXone   IVPB 2000 milliGRAM(s) IV Intermittent every 24 hours  metroNIDAZOLE  IVPB 500 milliGRAM(s) IV Intermittent every 8 hours      REVIEW OF SYSTEMS:  No chest pain   No SOB  No rash.       Vital Signs Last 24 Hrs  T(C): 36.6 (08-30-24 @ 14:21), Max: 36.7 (08-29-24 @ 21:51)  T(F): 97.9 (08-30-24 @ 14:21), Max: 98 (08-29-24 @ 21:51)  HR: 71 (08-30-24 @ 15:10) (71 - 93)  BP: 107/78 (08-30-24 @ 15:10) (102/78 - 123/81)  BP(mean): 88 (08-30-24 @ 13:06) (88 - 88)  RR: 17 (08-30-24 @ 15:10) (16 - 18)  SpO2: 99% (08-30-24 @ 15:10) (96% - 99%)      PHYSICAL EXAM:  Pt in no acute distress, alert, awake.   breathing comfortably   3 drains in place, soft abdomen.   no edema LE   no phlebitis                           10.6   9.99  )-----------( 386      ( 30 Aug 2024 10:03 )             33.5   08-29    138  |  97  |  9   ----------------------------<  128<H>  3.9   |  26  |  0.73    Ca    9.4      29 Aug 2024 06:53          Culture - Fungal, Other (collected 27 Aug 2024 17:20)  Source: .Other Abdominal Fluid  Preliminary Report (28 Aug 2024 23:03):    Culture is being performed. Fungal cultures are held for 4 weeks.    Culture - Acid Fast - Other w/Smear (collected 27 Aug 2024 17:20)  Source: .Other Other  Preliminary Report (28 Aug 2024 23:09):    Culture is being performed.    Culture - Acid Fast - Other w/Smear (collected 27 Aug 2024 17:20)  Source: .Other Other  Preliminary Report (28 Aug 2024 23:09):    Culture is being performed.    Culture - Fungal, Other (collected 27 Aug 2024 17:20)  Source: .Other Abdominal Fluid  Preliminary Report (28 Aug 2024 23:03):    Culture is being performed. Fungal cultures are held for 4 weeks.    Culture - Abscess with Gram Stain (collected 27 Aug 2024 17:20)  Source: .Abscess Aspirate  Gram Stain (28 Aug 2024 01:59):    Moderate polymorphonuclear leukocytes seen per low power field    No organisms seen per oil power field  Preliminary Report (28 Aug 2024 18:22):    No growth to date.    Culture - Abscess with Gram Stain (collected 27 Aug 2024 17:20)  Source: .Abscess Aspirate  Gram Stain (29 Aug 2024 22:30):    Few polymorphonuclear leukocytes seen per low power field    No organisms seen per oil power field  Final Report (29 Aug 2024 22:30):    Rare Bacteroides vulgatus group "Susceptibilities not performed"

## 2024-08-30 NOTE — PRE-ANESTHESIA EVALUATION ADULT - NSANTHPEFT_GEN_ALL_CORE
Gen: NAD, obese   Chest: decrease breath sounds BL   CVS: RRR
GENERAL: NAD  HEAD:  Atraumatic, Normocephalic  CHEST/LUNG: Clear to auscultation bilaterally  HEART: Normal S1/S2  PSYCH: AAOx3  NEUROLOGY: non-focal  SKIN: No obvious rashes or lesions.

## 2024-08-30 NOTE — PROGRESS NOTE ADULT - SUBJECTIVE AND OBJECTIVE BOX
date of service: 08-30-24 @ 06:04  [facility]  REVIEW OF SYSTEMS:  CONSTITUTIONAL: No fever,  no  weight loss  ENT:  No  tinnitus,   no   vertigo  NECK: No pain or stiffness  RESPIRATORY: No cough, wheezing, chills or hemoptysis;    No Shortness of Breath  CARDIOVASCULAR: No chest pain, palpitations, dizziness  GASTROINTESTINAL: No abdominal or epigastric pain. No nausea, vomiting, or hematemesis; No diarrhea  No melena or hematochezia.  GENITOURINARY: No dysuria, frequency, hematuria, or incontinence  NEUROLOGICAL: No headaches  SKIN: No itching,  no   rash  LYMPH Nodes: No enlarged glands  ENDOCRINE: No heat or cold intolerance  MUSCULOSKELETAL: No joint pain or swelling  PSYCHIATRIC: No depression, anxiety  HEME/LYMPH: No easy bruising, or bleeding gums  ALLERGY AND IMMUNOLOGIC: No hives or eczema	    MEDICATIONS  (STANDING):  atorvastatin 10 milliGRAM(s) Oral at bedtime  dextrose 5%. 1000 milliLiter(s) (100 mL/Hr) IV Continuous <Continuous>  dextrose 5%. 1000 milliLiter(s) (50 mL/Hr) IV Continuous <Continuous>  dextrose 50% Injectable 25 Gram(s) IV Push once  dextrose 50% Injectable 12.5 Gram(s) IV Push once  dextrose 50% Injectable 25 Gram(s) IV Push once  furosemide    Tablet 20 milliGRAM(s) Oral daily  glucagon  Injectable 1 milliGRAM(s) IntraMuscular once  insulin lispro (ADMELOG) corrective regimen sliding scale   SubCutaneous three times a day before meals  piperacillin/tazobactam IVPB.. 3.375 Gram(s) IV Intermittent every 8 hours  polyethylene glycol 3350 17 Gram(s) Oral daily  senna 2 Tablet(s) Oral at bedtime    MEDICATIONS  (PRN):  dextrose Oral Gel 15 Gram(s) Oral once PRN Blood Glucose LESS THAN 70 milliGRAM(s)/deciliter  HYDROmorphone   Tablet 2 milliGRAM(s) Oral every 6 hours PRN Moderate Pain (4 - 6)      Vital Signs Last 24 Hrs  T(C): 36.3 (30 Aug 2024 04:54), Max: 36.7 (29 Aug 2024 21:51)  T(F): 97.4 (30 Aug 2024 04:54), Max: 98 (29 Aug 2024 21:51)  HR: 87 (30 Aug 2024 04:54) (87 - 93)  BP: 107/78 (30 Aug 2024 04:54) (105/73 - 115/79)  BP(mean): --  RR: 18 (30 Aug 2024 04:54) (18 - 18)  SpO2: 97% (30 Aug 2024 04:54) (94% - 97%)    Parameters below as of 30 Aug 2024 04:54  Patient On (Oxygen Delivery Method): room air      CAPILLARY BLOOD GLUCOSE      POCT Blood Glucose.: 168 mg/dL (29 Aug 2024 22:19)  POCT Blood Glucose.: 109 mg/dL (29 Aug 2024 17:32)  POCT Blood Glucose.: 119 mg/dL (29 Aug 2024 13:05)  POCT Blood Glucose.: 132 mg/dL (29 Aug 2024 08:33)    I&O's Summary    28 Aug 2024 07:01  -  29 Aug 2024 07:00  --------------------------------------------------------  IN: 920 mL / OUT: 200 mL / NET: 720 mL    29 Aug 2024 07:01  -  30 Aug 2024 06:04  --------------------------------------------------------  IN: 240 mL / OUT: 100 mL / NET: 140 mL          Appearance: Normal	  HEENT:   Normal oral mucosa, PERRL, EOMI	  Lymphatic: No lymphadenopathy  Cardiovascular: Normal S1 S2, No JVD  Respiratory: Lungs clear to auscultation	  Gastrointestinal:  Soft, Non-tender, + BS	  Skin: No rash, No ecchymoses	  Extremities:     LABS:                        11.5   11.46 )-----------( 450      ( 29 Aug 2024 06:53 )             37.0     08-29    138  |  97  |  9   ----------------------------<  128<H>  3.9   |  26  |  0.73    Ca    9.4      29 Aug 2024 06:53    TPro  7.2  /  Alb  2.7<L>  /  TBili  0.6  /  DBili  0.2  /  AST  22  /  ALT  11  /  AlkPhos  119  08-28          Urinalysis Basic - ( 29 Aug 2024 06:53 )    Color: x / Appearance: x / SG: x / pH: x  Gluc: 128 mg/dL / Ketone: x  / Bili: x / Urobili: x   Blood: x / Protein: x / Nitrite: x   Leuk Esterase: x / RBC: x / WBC x   Sq Epi: x / Non Sq Epi: x / Bacteria: x                      Consultant(s) Notes Reviewed:      Care Discussed with Consultants/Other Providers:

## 2024-08-30 NOTE — CONSULT NOTE ADULT - PROVIDER SPECIALTY LIST ADULT
Hepatology
Gastroenterology
Surgery
Infectious Disease
Intervent Radiology
Cardiology
Intervent Radiology

## 2024-08-31 LAB
ALBUMIN SERPL ELPH-MCNC: 2.6 G/DL — LOW (ref 3.3–5)
ALP SERPL-CCNC: 111 U/L — SIGNIFICANT CHANGE UP (ref 40–120)
ALT FLD-CCNC: 10 U/L — SIGNIFICANT CHANGE UP (ref 10–45)
ANION GAP SERPL CALC-SCNC: 12 MMOL/L — SIGNIFICANT CHANGE UP (ref 5–17)
AST SERPL-CCNC: 15 U/L — SIGNIFICANT CHANGE UP (ref 10–40)
BILIRUB SERPL-MCNC: 0.4 MG/DL — SIGNIFICANT CHANGE UP (ref 0.2–1.2)
BUN SERPL-MCNC: 8 MG/DL — SIGNIFICANT CHANGE UP (ref 7–23)
CALCIUM SERPL-MCNC: 8.6 MG/DL — SIGNIFICANT CHANGE UP (ref 8.4–10.5)
CHLORIDE SERPL-SCNC: 97 MMOL/L — SIGNIFICANT CHANGE UP (ref 96–108)
CO2 SERPL-SCNC: 28 MMOL/L — SIGNIFICANT CHANGE UP (ref 22–31)
CREAT SERPL-MCNC: 0.63 MG/DL — SIGNIFICANT CHANGE UP (ref 0.5–1.3)
EGFR: 106 ML/MIN/1.73M2 — SIGNIFICANT CHANGE UP
GLUCOSE BLDC GLUCOMTR-MCNC: 108 MG/DL — HIGH (ref 70–99)
GLUCOSE BLDC GLUCOMTR-MCNC: 118 MG/DL — HIGH (ref 70–99)
GLUCOSE BLDC GLUCOMTR-MCNC: 148 MG/DL — HIGH (ref 70–99)
GLUCOSE BLDC GLUCOMTR-MCNC: 157 MG/DL — HIGH (ref 70–99)
GLUCOSE SERPL-MCNC: 112 MG/DL — HIGH (ref 70–99)
GRAM STN FLD: SIGNIFICANT CHANGE UP
HCT VFR BLD CALC: 36.1 % — LOW (ref 39–50)
HGB BLD-MCNC: 11.3 G/DL — LOW (ref 13–17)
MCHC RBC-ENTMCNC: 25.5 PG — LOW (ref 27–34)
MCHC RBC-ENTMCNC: 31.3 GM/DL — LOW (ref 32–36)
MCV RBC AUTO: 81.3 FL — SIGNIFICANT CHANGE UP (ref 80–100)
NRBC # BLD: 0 /100 WBCS — SIGNIFICANT CHANGE UP (ref 0–0)
PLATELET # BLD AUTO: 401 K/UL — HIGH (ref 150–400)
POTASSIUM SERPL-MCNC: 3.3 MMOL/L — LOW (ref 3.5–5.3)
POTASSIUM SERPL-SCNC: 3.3 MMOL/L — LOW (ref 3.5–5.3)
PROT SERPL-MCNC: 6.9 G/DL — SIGNIFICANT CHANGE UP (ref 6–8.3)
RBC # BLD: 4.44 M/UL — SIGNIFICANT CHANGE UP (ref 4.2–5.8)
RBC # FLD: 15.2 % — HIGH (ref 10.3–14.5)
SODIUM SERPL-SCNC: 137 MMOL/L — SIGNIFICANT CHANGE UP (ref 135–145)
SPECIMEN SOURCE: SIGNIFICANT CHANGE UP
WBC # BLD: 10.54 K/UL — HIGH (ref 3.8–10.5)
WBC # FLD AUTO: 10.54 K/UL — HIGH (ref 3.8–10.5)

## 2024-08-31 RX ORDER — POTASSIUM CHLORIDE 10 MEQ
20 TABLET, EXT RELEASE, PARTICLES/CRYSTALS ORAL ONCE
Refills: 0 | Status: COMPLETED | OUTPATIENT
Start: 2024-08-31 | End: 2024-08-31

## 2024-08-31 RX ORDER — POTASSIUM CHLORIDE 10 MEQ
40 TABLET, EXT RELEASE, PARTICLES/CRYSTALS ORAL ONCE
Refills: 0 | Status: COMPLETED | OUTPATIENT
Start: 2024-08-31 | End: 2024-08-31

## 2024-08-31 RX ORDER — POTASSIUM CHLORIDE 10 MEQ
20 TABLET, EXT RELEASE, PARTICLES/CRYSTALS ORAL
Refills: 0 | Status: DISCONTINUED | OUTPATIENT
Start: 2024-08-31 | End: 2024-08-31

## 2024-08-31 RX ORDER — HEPARIN SODIUM,BOVINE 1000/ML
5000 VIAL (ML) INJECTION EVERY 8 HOURS
Refills: 0 | Status: DISCONTINUED | OUTPATIENT
Start: 2024-08-31 | End: 2024-09-02

## 2024-08-31 RX ADMIN — Medication 20 MILLIGRAM(S): at 05:56

## 2024-08-31 RX ADMIN — Medication 100 MILLIGRAM(S): at 22:23

## 2024-08-31 RX ADMIN — Medication 100 MILLIGRAM(S): at 14:23

## 2024-08-31 RX ADMIN — Medication 100 MILLIGRAM(S): at 22:50

## 2024-08-31 RX ADMIN — Medication 5000 UNIT(S): at 14:23

## 2024-08-31 RX ADMIN — Medication 10 MILLIGRAM(S): at 22:23

## 2024-08-31 RX ADMIN — Medication 20 MILLIEQUIVALENT(S): at 14:52

## 2024-08-31 RX ADMIN — Medication 100 MILLIGRAM(S): at 05:57

## 2024-08-31 RX ADMIN — Medication 5000 UNIT(S): at 22:30

## 2024-08-31 RX ADMIN — Medication 1: at 12:43

## 2024-08-31 RX ADMIN — Medication 40 MILLIEQUIVALENT(S): at 11:20

## 2024-08-31 RX ADMIN — Medication 2 TABLET(S): at 22:23

## 2024-08-31 NOTE — PROGRESS NOTE ADULT - ASSESSMENT
64-year-old male     h/o  HTN.  HLD,  hypothyroidism,  ex  smoker . quit  a yr  ago           abdominal distention, pain, night sweats,   weight loss of 25 pounds.   for  2  months        at  [place]  er 1 month ago, CT scan/ US, told he had benign liver cysts and d/c . MRI  outpt,  then told togo  to  er   /   denies chest pain, fever, cough, recent travel.       abd  pain,  from  cysts       MRI    8/21/24. ,   poststomach,  heterogenous cyst  9.2 x 6.4cm; upper pelvis heterogenous cyst, j right of midline 6.4 x 4.0 cm             right and trace left pleural effusion. Few cystic/fluid filled structures within the liver measuring 4.4cm.  the lateral side,   21 x 14 cm cystic collection. abutting/ adjacent to the stomach,              cystic fluid structure measuring 9x6 cm ,     upper pelvis  cystic fluid structure heterogenous with debris                  pedal  edema/  sob   on exertion,  , for past  month or  so              ?  acute  diastolic    chf.  .  was   on   iv lasix.  echo, normal  ef       HTN/  HLD       Hypothyroid       DM,  follow  fs       Anemia       obesity  bmi  39/  albumin in  2  range/.  c/w  moderate  protein  calorie  malnutrition. suspect  from on  going  c/c  hepatic infevtion        ekg.    nsr, low   voltage / Echo, normal  ef        Ct  chest  angio,  no pe,  hepatic  cysts.  upto  2 2 cm     CT a/p.  mlple  hepatic cysts ,, largest  about   20  by  15  cm/           R/ L hepatic   cyst drainage,  by IR on  8/27,  . with     purulent  drainage ,  froylan  R  drain,   has   b/l  drains              per  surg  attending,  no role  for  surg intervention              abscess  cx.  Bacteroides   vukgatus,             rpt  ct  ,  decompressed  hepatic  cysts ,    now  with   fidel gastric  cyst  drainage   on  8/ 30  by  IR         per  ID,   dr ann,  need  to  wait    for  cx  report  from  8/30,  prior   to   d/c   planning       on  tocephin/  iv flagyl          dvt  ppx                < from: CT Abdomen and Pelvis w/ IV Cont (08.29.24 @ 15:53) >  ERITONEUM/RETROPERITONEUM: Decreases in size of right interloop   collection measuring 5.4 x2.7 cm, previously 5.7 x 3.0 cm on 8/24/2024.      rad< from: CT Angio Chest PE Protocol w/ IV Cont (08.23.24 @ 16:11) >  MPRESSION:  No pulmonary embolism.  Right hemidiaphragm elevation with mild atelectasis involving the right   middle lobe and right base.  Trace right pleural effusion.  Partially imaged cystic hepatic lesions with largest measuring up to 22   cm. The exact etiology is unclear and diagnostic considerations include   infection and potentially metastasis. Correlate with outside abdominal   MRI from 2 days ago and consider CT abdomen and pelvis for further   assessment  --- End of Report ---      <

## 2024-08-31 NOTE — PROGRESS NOTE ADULT - SUBJECTIVE AND OBJECTIVE BOX
Date of Service: 08-31-24 @ 12:35           CARDIOLOGY     PROGRESS  NOTE   ________________________________________________    CHIEF COMPLAINT:Patient is a 64y old  Male who presents with a chief complaint of abd  pain (31 Aug 2024 06:06)  no complain  	  REVIEW OF SYSTEMS:  CONSTITUTIONAL: No fever, weight loss, or fatigue  EYES: No eye pain, visual disturbances, or discharge  ENT:  No difficulty hearing, tinnitus, vertigo; No sinus or throat pain  NECK: No pain or stiffness  RESPIRATORY: No cough, wheezing, chills or hemoptysis; No Shortness of Breath  CARDIOVASCULAR: No chest pain, palpitations, passing out, dizziness, or leg swelling  GASTROINTESTINAL: No abdominal or epigastric pain. No nausea, vomiting, or hematemesis; No diarrhea or constipation. No melena or hematochezia.  GENITOURINARY: No dysuria, frequency, hematuria, or incontinence  NEUROLOGICAL: No headaches, memory loss, loss of strength, numbness, or tremors  SKIN: No itching, burning, rashes, or lesions   LYMPH Nodes: No enlarged glands  ENDOCRINE: No heat or cold intolerance; No hair loss  MUSCULOSKELETAL: No joint pain or swelling; No muscle, back, or extremity pain  PSYCHIATRIC: No depression, anxiety, mood swings, or difficulty sleeping  HEME/LYMPH: No easy bruising, or bleeding gums  ALLERGY AND IMMUNOLOGIC: No hives or eczema	    [x ] All others negative	  [ ] Unable to obtain    PHYSICAL EXAM:  T(C): 36.5 (08-31-24 @ 05:03), Max: 36.6 (08-30-24 @ 14:21)  HR: 85 (08-31-24 @ 05:03) (71 - 87)  BP: 114/77 (08-31-24 @ 05:03) (102/78 - 123/81)  RR: 18 (08-31-24 @ 05:03) (16 - 18)  SpO2: 98% (08-31-24 @ 05:03) (96% - 99%)  Wt(kg): --  I&O's Summary    30 Aug 2024 07:01  -  31 Aug 2024 07:00  --------------------------------------------------------  IN: 240 mL / OUT: 320 mL / NET: -80 mL        Appearance: Normal	  HEENT:   Normal oral mucosa, PERRL, EOMI	  Lymphatic: No lymphadenopathy  Cardiovascular: Normal S1 S2, No JVD, + murmurs, No edema  Respiratory: rhonchi  Gastrointestinal:  Soft, Non-tender, + BS	  Skin: No rashes, No ecchymoses, No cyanosis	  Extremities: Normal range of motion, No clubbing, cyanosis or edema  Vascular: Peripheral pulses palpable 2+ bilaterally    MEDICATIONS  (STANDING):  atorvastatin 10 milliGRAM(s) Oral at bedtime  cefTRIAXone   IVPB 2000 milliGRAM(s) IV Intermittent every 24 hours  dextrose 5%. 1000 milliLiter(s) (100 mL/Hr) IV Continuous <Continuous>  dextrose 5%. 1000 milliLiter(s) (50 mL/Hr) IV Continuous <Continuous>  dextrose 50% Injectable 25 Gram(s) IV Push once  dextrose 50% Injectable 12.5 Gram(s) IV Push once  dextrose 50% Injectable 25 Gram(s) IV Push once  furosemide    Tablet 20 milliGRAM(s) Oral daily  glucagon  Injectable 1 milliGRAM(s) IntraMuscular once  heparin   Injectable 5000 Unit(s) SubCutaneous every 8 hours  insulin lispro (ADMELOG) corrective regimen sliding scale   SubCutaneous three times a day before meals  metroNIDAZOLE  IVPB 500 milliGRAM(s) IV Intermittent every 8 hours  polyethylene glycol 3350 17 Gram(s) Oral daily  senna 2 Tablet(s) Oral at bedtime      TELEMETRY: 	    ECG:  	  RADIOLOGY:  OTHER: 	  	  LABS:	 	    CARDIAC MARKERS                        11.3   10.54 )-----------( 401      ( 31 Aug 2024 07:05 )             36.1     08-31    137  |  97  |  8   ----------------------------<  112<H>  3.3<L>   |  28  |  0.63    Ca    8.6      31 Aug 2024 07:05    TPro  6.9  /  Alb  2.6<L>  /  TBili  0.4  /  DBili  x   /  AST  15  /  ALT  10  /  AlkPhos  111  08-31    proBNP:   Lipid Profile:   HgA1c:   TSH:   PT/INR - ( 30 Aug 2024 10:03 )   PT: 13.9 sec;   INR: 1.33 ratio      Culture - Aspirate with Gram Stain (08.30.24 @ 16:50)    Specimen Source: Aspirate Aspirate   Gram Stain:   Numerous polymorphonuclear leukocytes seen per low power field  No organisms seen per oil power field        < from: TTE W or WO Ultrasound Enhancing Agent (08.26.24 @ 07:15) >   1. Technically difficult image quality.   2. Left ventricular cavity is normal in size. Left ventricular systolic function is normal with an ejection fraction visually estimated at 60 to 65 %. There are no regional wall motion abnormalities seen.   3. Mildly enlarged right ventricular cavity size and normal right ventricular systolic function.   4. Normal left and right atrial size.   5. No significant valvular disease.   6. No pericardial effusion seen.   7. Abdominal ascites is incidentally noted.   8. No prior echocardiogram is available for comparison.      < end of copied text >     Assessment and plan  ---------------------------  64-year-old male with PMHx of hypertension, hypercholesterolemia, hypothyroidism presenting with 2 months of abdominal distention and pain, night sweats, and weight loss of 25 pounds.  Endorses SOB with trouble taking deep breaths, improving. Patient is a former smoker, quit 1 year ago. + constipation, + dysuria intermittent x 1 month.  Last bowel movement 2 days ago, wnl. + flatulance.  Patient evaluated at [facility] 1 month ago, had CT scan and US, told he had benign liver cysts and discharged. When abd discomfort  did not improve pt followed up with his PCP who sent him for an MRI 2 days ago.  Patient called last night and told to go to ED for follow-up testing. Results were not discussed with him. Denies chest pain, fever, cough, recent travel.  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 Denies chronic alcohol use. Pt did at home H pylori test last week, came back positive.  pt with hx of htn, dm with hepatic mass with  increasing sob and LE  edema  no cardiac dawn done before  awaiting repeat ecg, noted NSR  tsh  lipid panel  decrease lasix  dvt prophylaxis  ct scan noted, hepatic cyst  decrease lasix to 20 mg daily, echo noted with RV enlarged mild , needs sleep study as out pt, no DVT or PE  fu as out pt for ischemia dawn/ sleep study as out pt  s/p drainage of the liver cyst, started on zosyn  follow up cultures/ ID noted  increase ambulation  continue with lasix 20 mg daily  continue iv abx  replete K

## 2024-09-01 LAB
ANION GAP SERPL CALC-SCNC: 12 MMOL/L — SIGNIFICANT CHANGE UP (ref 5–17)
BUN SERPL-MCNC: 8 MG/DL — SIGNIFICANT CHANGE UP (ref 7–23)
CALCIUM SERPL-MCNC: 9 MG/DL — SIGNIFICANT CHANGE UP (ref 8.4–10.5)
CHLORIDE SERPL-SCNC: 100 MMOL/L — SIGNIFICANT CHANGE UP (ref 96–108)
CO2 SERPL-SCNC: 27 MMOL/L — SIGNIFICANT CHANGE UP (ref 22–31)
CREAT SERPL-MCNC: <0.3 MG/DL — LOW (ref 0.5–1.3)
CULTURE RESULTS: ABNORMAL
EGFR: 133 ML/MIN/1.73M2 — SIGNIFICANT CHANGE UP
GLUCOSE BLDC GLUCOMTR-MCNC: 132 MG/DL — HIGH (ref 70–99)
GLUCOSE BLDC GLUCOMTR-MCNC: 146 MG/DL — HIGH (ref 70–99)
GLUCOSE BLDC GLUCOMTR-MCNC: 154 MG/DL — HIGH (ref 70–99)
GLUCOSE BLDC GLUCOMTR-MCNC: 214 MG/DL — HIGH (ref 70–99)
GLUCOSE SERPL-MCNC: 120 MG/DL — HIGH (ref 70–99)
GRAM STN FLD: ABNORMAL
POTASSIUM SERPL-MCNC: 3.9 MMOL/L — SIGNIFICANT CHANGE UP (ref 3.5–5.3)
POTASSIUM SERPL-SCNC: 3.9 MMOL/L — SIGNIFICANT CHANGE UP (ref 3.5–5.3)
SODIUM SERPL-SCNC: 139 MMOL/L — SIGNIFICANT CHANGE UP (ref 135–145)
SPECIMEN SOURCE: SIGNIFICANT CHANGE UP

## 2024-09-01 PROCEDURE — 99232 SBSQ HOSP IP/OBS MODERATE 35: CPT

## 2024-09-01 RX ADMIN — Medication 5000 UNIT(S): at 22:46

## 2024-09-01 RX ADMIN — Medication 20 MILLIGRAM(S): at 05:04

## 2024-09-01 RX ADMIN — Medication 2 TABLET(S): at 22:47

## 2024-09-01 RX ADMIN — Medication 5000 UNIT(S): at 13:20

## 2024-09-01 RX ADMIN — Medication 10 MILLIGRAM(S): at 22:46

## 2024-09-01 RX ADMIN — Medication 100 MILLIGRAM(S): at 05:04

## 2024-09-01 RX ADMIN — Medication 100 MILLIGRAM(S): at 22:46

## 2024-09-01 RX ADMIN — Medication 100 MILLIGRAM(S): at 13:19

## 2024-09-01 RX ADMIN — Medication 5000 UNIT(S): at 05:05

## 2024-09-01 NOTE — PROGRESS NOTE ADULT - SUBJECTIVE AND OBJECTIVE BOX
date of service: 09-01-24 @ 07:10  [facility]  REVIEW OF SYSTEMS:  CONSTITUTIONAL: No fever,  no  weight loss  ENT:  No  tinnitus,   no   vertigo  NECK: No pain or stiffness  RESPIRATORY: No cough, wheezing, chills or hemoptysis;    No Shortness of Breath  CARDIOVASCULAR: No chest pain, palpitations, dizziness  GASTROINTESTINAL: No abdominal or epigastric pain. No nausea, vomiting, or hematemesis; No diarrhea  No melena or hematochezia.  GENITOURINARY: No dysuria, frequency, hematuria, or incontinence  NEUROLOGICAL: No headaches  SKIN: No itching,  no   rash  LYMPH Nodes: No enlarged glands  ENDOCRINE: No heat or cold intolerance  MUSCULOSKELETAL: No joint pain or swelling  PSYCHIATRIC: No depression, anxiety  HEME/LYMPH: No easy bruising, or bleeding gums  ALLERGY AND IMMUNOLOGIC: No hives or eczema	    MEDICATIONS  (STANDING):  atorvastatin 10 milliGRAM(s) Oral at bedtime  cefTRIAXone   IVPB 2000 milliGRAM(s) IV Intermittent every 24 hours  dextrose 5%. 1000 milliLiter(s) (100 mL/Hr) IV Continuous <Continuous>  dextrose 5%. 1000 milliLiter(s) (50 mL/Hr) IV Continuous <Continuous>  dextrose 50% Injectable 25 Gram(s) IV Push once  dextrose 50% Injectable 12.5 Gram(s) IV Push once  dextrose 50% Injectable 25 Gram(s) IV Push once  furosemide    Tablet 20 milliGRAM(s) Oral daily  glucagon  Injectable 1 milliGRAM(s) IntraMuscular once  heparin   Injectable 5000 Unit(s) SubCutaneous every 8 hours  insulin lispro (ADMELOG) corrective regimen sliding scale   SubCutaneous three times a day before meals  metroNIDAZOLE  IVPB 500 milliGRAM(s) IV Intermittent every 8 hours  polyethylene glycol 3350 17 Gram(s) Oral daily  senna 2 Tablet(s) Oral at bedtime    MEDICATIONS  (PRN):  dextrose Oral Gel 15 Gram(s) Oral once PRN Blood Glucose LESS THAN 70 milliGRAM(s)/deciliter      Vital Signs Last 24 Hrs  T(C): 36.5 (01 Sep 2024 04:23), Max: 36.6 (31 Aug 2024 21:10)  T(F): 97.7 (01 Sep 2024 04:23), Max: 97.9 (31 Aug 2024 21:10)  HR: 89 (01 Sep 2024 04:23) (64 - 89)  BP: 113/83 (01 Sep 2024 04:23) (107/72 - 118/85)  BP(mean): --  RR: 18 (01 Sep 2024 04:23) (18 - 18)  SpO2: 95% (01 Sep 2024 04:23) (93% - 95%)    Parameters below as of 01 Sep 2024 04:23  Patient On (Oxygen Delivery Method): room air      CAPILLARY BLOOD GLUCOSE      POCT Blood Glucose.: 148 mg/dL (31 Aug 2024 21:44)  POCT Blood Glucose.: 108 mg/dL (31 Aug 2024 17:49)  POCT Blood Glucose.: 157 mg/dL (31 Aug 2024 12:38)  POCT Blood Glucose.: 118 mg/dL (31 Aug 2024 08:29)    I&O's Summary    31 Aug 2024 07:01  -  01 Sep 2024 07:00  --------------------------------------------------------  IN: 0 mL / OUT: 197 mL / NET: -197 mL          Appearance: Normal	  HEENT:   Normal oral mucosa, PERRL, EOMI	  Lymphatic: No lymphadenopathy  Cardiovascular: Normal S1 S2, No JVD  Respiratory: Lungs clear to auscultation	  Gastrointestinal:  Soft, Non-tender, + BS	  Skin: No rash, No ecchymoses	  Extremities:     LABS:                        11.3   10.54 )-----------( 401      ( 31 Aug 2024 07:05 )             36.1     08-31    137  |  97  |  8   ----------------------------<  112<H>  3.3<L>   |  28  |  0.63    Ca    8.6      31 Aug 2024 07:05    TPro  6.9  /  Alb  2.6<L>  /  TBili  0.4  /  DBili  x   /  AST  15  /  ALT  10  /  AlkPhos  111  08-31    PT/INR - ( 30 Aug 2024 10:03 )   PT: 13.9 sec;   INR: 1.33 ratio               Urinalysis Basic - ( 31 Aug 2024 07:05 )    Color: x / Appearance: x / SG: x / pH: x  Gluc: 112 mg/dL / Ketone: x  / Bili: x / Urobili: x   Blood: x / Protein: x / Nitrite: x   Leuk Esterase: x / RBC: x / WBC x   Sq Epi: x / Non Sq Epi: x / Bacteria: x                      Consultant(s) Notes Reviewed:      Care Discussed with Consultants/Other Providers:

## 2024-09-01 NOTE — PROGRESS NOTE ADULT - ASSESSMENT
64-year-old male with PMHx of hypertension, hypercholesterolemia, hypothyroidism presenting with pain abdomen,  weakness, weight loss since July. He started feeling unwell since July 4th.  He had significant weight loss of 25 pounds. Former smoker, quit 1 year ago. He was  evaluated at [facility] [place] [facility] 1 month ago, had CT scan and US, told he had benign liver cysts and discharged.  Due  to  persistent  abd  pain,  his  pcp,   did  an  MRI and pt  was   called last night and told to go to  er  for follow-up testing.     Has been afebrile, NO leukocytosis, preserved renal/liver functions, CRP high 197  Has outpt MRI disc and reports with him - large cystic lesions noted.     CT repeated and reveals large cysts.    # liver abscess, elevated CRP, positive culture finding.      - c/w ceftriaxone and flagyl.   - s/p IR drainage of cyst - f/u bacterial, fungal, AFB cultures. 2L of purulent fluid drained. Prelim with bacteroides   - sx on board. repeat CT with improved collection. based on CT s/p placement of 3rd drain. cx NTD   - follow up blood culture in lab - NGTD  - entamoeba serology negative   - quant gold was negative  - can transition to po cefpodoxime 200 mg q12h and flagyl 500 mg q8h when ready for discharge for 4 weeks until 9/26/24.   pt to follow with me in 3 weeks.   side effects of abx discussed in detail.       Plan discussed with Medicine Attending       Omayra Douglas  Please contact through MS Teams   If no response or past 5 pm/weekend call [phone].

## 2024-09-01 NOTE — PROGRESS NOTE ADULT - SUBJECTIVE AND OBJECTIVE BOX
Date of Service: 09-01-24 @ 16:01           CARDIOLOGY     PROGRESS  NOTE   ________________________________________________    CHIEF COMPLAINT:Patient is a 64y old  Male who presents with a chief complaint of abd  pain (01 Sep 2024 14:41)  no complain  	  REVIEW OF SYSTEMS:  CONSTITUTIONAL: No fever, weight loss, or fatigue  EYES: No eye pain, visual disturbances, or discharge  ENT:  No difficulty hearing, tinnitus, vertigo; No sinus or throat pain  NECK: No pain or stiffness  RESPIRATORY: No cough, wheezing, chills or hemoptysis; No Shortness of Breath  CARDIOVASCULAR: No chest pain, palpitations, passing out, dizziness, or leg swelling  GASTROINTESTINAL: No abdominal or epigastric pain. No nausea, vomiting, or hematemesis; No diarrhea or constipation. No melena or hematochezia.  GENITOURINARY: No dysuria, frequency, hematuria, or incontinence  NEUROLOGICAL: No headaches, memory loss, loss of strength, numbness, or tremors  SKIN: No itching, burning, rashes, or lesions   LYMPH Nodes: No enlarged glands  ENDOCRINE: No heat or cold intolerance; No hair loss  MUSCULOSKELETAL: No joint pain or swelling; No muscle, back, or extremity pain  PSYCHIATRIC: No depression, anxiety, mood swings, or difficulty sleeping  HEME/LYMPH: No easy bruising, or bleeding gums  ALLERGY AND IMMUNOLOGIC: No hives or eczema	    [ x] All others negative	  [ ] Unable to obtain    PHYSICAL EXAM:  T(C): 36.6 (09-01-24 @ 12:25), Max: 36.6 (08-31-24 @ 21:10)  HR: 88 (09-01-24 @ 12:25) (85 - 89)  BP: 122/84 (09-01-24 @ 12:25) (107/72 - 122/84)  RR: 18 (09-01-24 @ 12:25) (18 - 18)  SpO2: 95% (09-01-24 @ 12:25) (94% - 95%)  Wt(kg): --  I&O's Summary    31 Aug 2024 07:01  -  01 Sep 2024 07:00  --------------------------------------------------------  IN: 0 mL / OUT: 197 mL / NET: -197 mL        Appearance: Normal	  HEENT:   Normal oral mucosa, PERRL, EOMI	  Lymphatic: No lymphadenopathy  Cardiovascular: Normal S1 S2, No JVD, + murmurs, No edema  Respiratory: rhonchi  Psychiatry: A & O x 3, Mood & affect appropriate  Gastrointestinal:  Soft, Non-tender, + BS	  Skin: No rashes, No ecchymoses, No cyanosis	  Neurologic: Non-focal  Extremities: Normal range of motion, No clubbing, cyanosis or edema  Vascular: Peripheral pulses palpable 2+ bilaterally    MEDICATIONS  (STANDING):  atorvastatin 10 milliGRAM(s) Oral at bedtime  cefTRIAXone   IVPB 2000 milliGRAM(s) IV Intermittent every 24 hours  dextrose 5%. 1000 milliLiter(s) (50 mL/Hr) IV Continuous <Continuous>  dextrose 5%. 1000 milliLiter(s) (100 mL/Hr) IV Continuous <Continuous>  dextrose 50% Injectable 25 Gram(s) IV Push once  dextrose 50% Injectable 12.5 Gram(s) IV Push once  dextrose 50% Injectable 25 Gram(s) IV Push once  furosemide    Tablet 20 milliGRAM(s) Oral daily  glucagon  Injectable 1 milliGRAM(s) IntraMuscular once  heparin   Injectable 5000 Unit(s) SubCutaneous every 8 hours  insulin lispro (ADMELOG) corrective regimen sliding scale   SubCutaneous three times a day before meals  metroNIDAZOLE  IVPB 500 milliGRAM(s) IV Intermittent every 8 hours  polyethylene glycol 3350 17 Gram(s) Oral daily  senna 2 Tablet(s) Oral at bedtime      TELEMETRY: 	    ECG:  	  RADIOLOGY:  OTHER: 	  	  LABS:	 	    CARDIAC MARKERS:                            11.3   10.54 )-----------( 401      ( 31 Aug 2024 07:05 )             36.1     09-01    139  |  100  |  8   ----------------------------<  120<H>  3.9   |  27  |  <0.30<L>    Ca    9.0      01 Sep 2024 07:21    TPro  6.9  /  Alb  2.6<L>  /  TBili  0.4  /  DBili  x   /  AST  15  /  ALT  10  /  AlkPhos  111  08-31    proBNP:   Lipid Profile:   HgA1c:   TSH:     < from: CT Abdomen and Pelvis w/ IV Cont (08.29.24 @ 15:53) >  BOWEL: No bowel obstruction. Appendix is normal.  PERITONEUM/RETROPERITONEUM: Decreases in size of right interloop   collection measuring 5.4 x2.7 cm, previously 5.7 x 3.0 cm on 8/24/2024.   Small volume perihepatic fluid.  VESSELS: Atherosclerotic changes. Patent right portal vein.  LYMPH NODES: No lymphadenopathy.  ABDOMINAL WALL: Within normal limits.  BONES: Left hip total arthroplasty.Degenerative changes of the spine.      IMPRESSION:  Decompressed hepatic cysts as above.    Nodular opacities in the right lung; recommend one-month follow-up.        Assessment and plan  ---------------------------  64-year-old male with PMHx of hypertension, hypercholesterolemia, hypothyroidism presenting with 2 months of abdominal distention and pain, night sweats, and weight loss of 25 pounds.  Endorses SOB with trouble taking deep breaths, improving. Patient is a former smoker, quit 1 year ago. + constipation, + dysuria intermittent x 1 month.  Last bowel movement 2 days ago, wnl. + flatulance.  Patient evaluated at [place] [facility] 1 month ago, had CT scan and US, told he had benign liver cysts and discharged. When abd discomfort  did not improve pt followed up with his PCP who sent him for an MRI 2 days ago.  Patient called last night and told to go to ED for follow-up testing. Results were not discussed with him. Denies chest pain, fever, cough, recent travel.  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 Denies chronic alcohol use. Pt did at home H pylori test last week, came back positive.  pt with hx of htn, dm with hepatic mass with  increasing sob and LE  edema  no cardiac dawn done before  awaiting repeat ecg, noted NSR  tsh  lipid panel  decrease lasix  dvt prophylaxis  ct scan noted, hepatic cyst  decrease lasix to 20 mg daily, echo noted with RV enlarged mild , needs sleep study as out pt, no DVT or PE  fu as out pt for ischemia dawn/ sleep study as out pt  s/p drainage of the liver cyst, started on zosyn  follow up cultures/ ID noted  increase ambulation  continue with lasix 20 mg daily  continue iv abx  replete K  ct chest in 1 month

## 2024-09-01 NOTE — PROGRESS NOTE ADULT - SUBJECTIVE AND OBJECTIVE BOX
64yPatient is a 64y old  Male who presents with a chief complaint of abd  pain (01 Sep 2024 07:10)      Interval history:  Afebrile, feeling well.       Allergies:   No Known Allergies    Antimicrobials:  cefTRIAXone   IVPB 2000 milliGRAM(s) IV Intermittent every 24 hours  metroNIDAZOLE  IVPB 500 milliGRAM(s) IV Intermittent every 8 hours      REVIEW OF SYSTEMS:  No chest pain   No SOB  No dysuria   No rash.       Vital Signs Last 24 Hrs  T(C): 36.6 (09-01-24 @ 12:25), Max: 36.6 (08-31-24 @ 21:10)  T(F): 97.8 (09-01-24 @ 12:25), Max: 97.9 (08-31-24 @ 21:10)  HR: 88 (09-01-24 @ 12:25) (85 - 89)  BP: 122/84 (09-01-24 @ 12:25) (107/72 - 122/84)  BP(mean): --  RR: 18 (09-01-24 @ 12:25) (18 - 18)  SpO2: 95% (09-01-24 @ 12:25) (94% - 95%)      PHYSICAL EXAM:  Pt in no acute distress, alert, awake.   breathing comfortably   3 drains in place, soft abdomen.   no edema LE   no phlebitis                                 11.3   10.54 )-----------( 401      ( 31 Aug 2024 07:05 )             36.1   09-01    139  |  100  |  8   ----------------------------<  120<H>  3.9   |  27  |  <0.30<L>    Ca    9.0      01 Sep 2024 07:21    TPro  6.9  /  Alb  2.6<L>  /  TBili  0.4  /  DBili  x   /  AST  15  /  ALT  10  /  AlkPhos  111  08-31      LIVER FUNCTIONS - ( 31 Aug 2024 07:05 )  Alb: 2.6 g/dL / Pro: 6.9 g/dL / ALK PHOS: 111 U/L / ALT: 10 U/L / AST: 15 U/L / GGT: x               Culture - Aspirate with Gram Stain (collected 30 Aug 2024 16:50)  Source: Aspirate Aspirate  Gram Stain (31 Aug 2024 02:28):    Numerous polymorphonuclear leukocytes seen per low power field    No organisms seen per oil power field  Preliminary Report (31 Aug 2024 18:54):    No growth to date.

## 2024-09-01 NOTE — PROGRESS NOTE ADULT - ASSESSMENT
64-year-old male     h/o  HTN.  HLD,  hypothyroidism,  ex  smoker . quit  a yr  ago           abdominal distention, pain, night sweats,   weight loss of 25 pounds.   for  2  months        at  [place]  er 1 month ago, CT scan/ US, told he had benign liver cysts and d/c . MRI  outpt,  then told togo  to  er   /   denies chest pain, fever, cough, recent travel.       abd  pain,  from  cysts       MRI    8/21/24. ,   poststomach,  heterogenous cyst  9.2 x 6.4cm; upper pelvis heterogenous cyst, j right of midline 6.4 x 4.0 cm             right and trace left pleural effusion. Few cystic/fluid filled structures within the liver measuring 4.4cm.  the lateral side,   21 x 14 cm cystic collection. abutting/ adjacent to the stomach,              cystic fluid structure measuring 9x6 cm ,     upper pelvis  cystic fluid structure heterogenous with debris                  pedal  edema/  sob   on exertion,  , for past  month or  so              ?  acute  diastolic    chf.  .  was   on   iv lasix.  echo, normal  ef       HTN/  HLD       Hypothyroid       DM,  follow  fs       Anemia       obesity  bmi  39/  albumin in  2  range/.  c/w  moderate  protein  calorie  malnutrition. suspect  from on  going  c/c  hepatic infevtion        ekg.    nsr, low   voltage / Echo, normal  ef        Ct  chest  angio,  no pe,  hepatic  cysts.  upto  2 2 cm     CT a/p.  mlple  hepatic cysts ,, largest  about   20  by  15  cm/           R/ L hepatic   cyst drainage,  by IR on  8/27,  . with     purulent  drainage ,  froylan  R  drain,   has   b/l  drains              per  surg  attending,  no role  for  surg intervention              abscess  cx.  Bacteroides   vukgatus,             rpt  ct  ,  decompressed  hepatic  cysts ,    now  with   fidel gastric  cyst  drainage   on  8/ 30  by  IR         per  ID,   dr ann,  need  to  wait    for  cx  report  from  8/30,  prior   to   d/c   planning       on  tocephin/  iv flagy       aspirate  from  8/20,  no   growth'         [facility]  ID  f/p,  for  oral  ab    d/c  plans  ,  when  cleared  by ID/  home care.  ha s 3  abd   drains          dvt  ppx                < from: CT Abdomen and Pelvis w/ IV Cont (08.29.24 @ 15:53) >  ERITONEUM/RETROPERITONEUM: Decreases in size of right interloop   collection measuring 5.4 x2.7 cm, previously 5.7 x 3.0 cm on 8/24/2024.      rad< from: CT Angio Chest PE Protocol w/ IV Cont (08.23.24 @ 16:11) >  MPRESSION:  No pulmonary embolism.  Right hemidiaphragm elevation with mild atelectasis involving the right   middle lobe and right base.  Trace right pleural effusion.  Partially imaged cystic hepatic lesions with largest measuring up to 22   cm. The exact etiology is unclear and diagnostic considerations include   infection and potentially metastasis. Correlate with outside abdominal   MRI from 2 days ago and consider CT abdomen and pelvis for further   assessment  --- End of Report ---      <

## 2024-09-02 ENCOUNTER — TRANSCRIPTION ENCOUNTER (OUTPATIENT)
Age: 64
End: 2024-09-02

## 2024-09-02 VITALS
OXYGEN SATURATION: 95 % | RESPIRATION RATE: 18 BRPM | SYSTOLIC BLOOD PRESSURE: 124 MMHG | TEMPERATURE: 98 F | DIASTOLIC BLOOD PRESSURE: 87 MMHG | HEART RATE: 87 BPM

## 2024-09-02 LAB — GLUCOSE BLDC GLUCOMTR-MCNC: 120 MG/DL — HIGH (ref 70–99)

## 2024-09-02 PROCEDURE — 87075 CULTR BACTERIA EXCEPT BLOOD: CPT

## 2024-09-02 PROCEDURE — C1729: CPT

## 2024-09-02 PROCEDURE — 82378 CARCINOEMBRYONIC ANTIGEN: CPT

## 2024-09-02 PROCEDURE — C1769: CPT

## 2024-09-02 PROCEDURE — 71275 CT ANGIOGRAPHY CHEST: CPT | Mod: MC

## 2024-09-02 PROCEDURE — 85730 THROMBOPLASTIN TIME PARTIAL: CPT

## 2024-09-02 PROCEDURE — 86703 HIV-1/HIV-2 1 RESULT ANTBDY: CPT

## 2024-09-02 PROCEDURE — 85027 COMPLETE CBC AUTOMATED: CPT

## 2024-09-02 PROCEDURE — 86900 BLOOD TYPING SEROLOGIC ABO: CPT

## 2024-09-02 PROCEDURE — 86901 BLOOD TYPING SEROLOGIC RH(D): CPT

## 2024-09-02 PROCEDURE — 82962 GLUCOSE BLOOD TEST: CPT

## 2024-09-02 PROCEDURE — 80074 ACUTE HEPATITIS PANEL: CPT

## 2024-09-02 PROCEDURE — C8929: CPT

## 2024-09-02 PROCEDURE — 82105 ALPHA-FETOPROTEIN SERUM: CPT

## 2024-09-02 PROCEDURE — 86753 PROTOZOA ANTIBODY NOS: CPT

## 2024-09-02 PROCEDURE — 71045 X-RAY EXAM CHEST 1 VIEW: CPT

## 2024-09-02 PROCEDURE — 99232 SBSQ HOSP IP/OBS MODERATE 35: CPT

## 2024-09-02 PROCEDURE — 49406 IMAGE CATH FLUID PERI/RETRO: CPT | Mod: 59

## 2024-09-02 PROCEDURE — 87116 MYCOBACTERIA CULTURE: CPT

## 2024-09-02 PROCEDURE — 87077 CULTURE AEROBIC IDENTIFY: CPT

## 2024-09-02 PROCEDURE — 86140 C-REACTIVE PROTEIN: CPT

## 2024-09-02 PROCEDURE — 87070 CULTURE OTHR SPECIMN AEROBIC: CPT

## 2024-09-02 PROCEDURE — 87205 SMEAR GRAM STAIN: CPT

## 2024-09-02 PROCEDURE — 87637 SARSCOV2&INF A&B&RSV AMP PRB: CPT

## 2024-09-02 PROCEDURE — 86480 TB TEST CELL IMMUN MEASURE: CPT

## 2024-09-02 PROCEDURE — 85610 PROTHROMBIN TIME: CPT

## 2024-09-02 PROCEDURE — 49405 IMAGE CATH FLUID COLXN VISC: CPT

## 2024-09-02 PROCEDURE — 85025 COMPLETE CBC W/AUTO DIFF WBC: CPT

## 2024-09-02 PROCEDURE — 74177 CT ABD & PELVIS W/CONTRAST: CPT | Mod: MC

## 2024-09-02 PROCEDURE — 84145 PROCALCITONIN (PCT): CPT

## 2024-09-02 PROCEDURE — 80053 COMPREHEN METABOLIC PANEL: CPT

## 2024-09-02 PROCEDURE — 87102 FUNGUS ISOLATION CULTURE: CPT

## 2024-09-02 PROCEDURE — 86850 RBC ANTIBODY SCREEN: CPT

## 2024-09-02 PROCEDURE — 36415 COLL VENOUS BLD VENIPUNCTURE: CPT

## 2024-09-02 PROCEDURE — 83036 HEMOGLOBIN GLYCOSYLATED A1C: CPT

## 2024-09-02 PROCEDURE — 80048 BASIC METABOLIC PNL TOTAL CA: CPT

## 2024-09-02 PROCEDURE — 99285 EMERGENCY DEPT VISIT HI MDM: CPT

## 2024-09-02 PROCEDURE — 88305 TISSUE EXAM BY PATHOLOGIST: CPT

## 2024-09-02 PROCEDURE — 93970 EXTREMITY STUDY: CPT

## 2024-09-02 PROCEDURE — 80076 HEPATIC FUNCTION PANEL: CPT

## 2024-09-02 PROCEDURE — 87040 BLOOD CULTURE FOR BACTERIA: CPT

## 2024-09-02 PROCEDURE — 87206 SMEAR FLUORESCENT/ACID STAI: CPT

## 2024-09-02 PROCEDURE — 88112 CYTOPATH CELL ENHANCE TECH: CPT

## 2024-09-02 RX ORDER — LEVOTHYROXINE SODIUM 100 MCG
1 TABLET ORAL
Qty: 0 | Refills: 0 | DISCHARGE
Start: 2024-09-02

## 2024-09-02 RX ORDER — METRONIDAZOLE 250 MG
500 TABLET ORAL EVERY 8 HOURS
Refills: 0 | Status: DISCONTINUED | OUTPATIENT
Start: 2024-09-02 | End: 2024-09-02

## 2024-09-02 RX ORDER — CEFPODOXIME PROXETIL 100 MG/5ML
1 GRANULE, FOR SUSPENSION ORAL
Qty: 46 | Refills: 0
Start: 2024-09-02 | End: 2024-09-24

## 2024-09-02 RX ORDER — LEVOTHYROXINE SODIUM 100 MCG
50 TABLET ORAL DAILY
Refills: 0 | Status: DISCONTINUED | OUTPATIENT
Start: 2024-09-02 | End: 2024-09-02

## 2024-09-02 RX ORDER — FUROSEMIDE 40 MG
1 TABLET ORAL
Qty: 30 | Refills: 0
Start: 2024-09-02 | End: 2024-10-01

## 2024-09-02 RX ORDER — CEFPODOXIME PROXETIL 100 MG/5ML
200 GRANULE, FOR SUSPENSION ORAL EVERY 12 HOURS
Refills: 0 | Status: DISCONTINUED | OUTPATIENT
Start: 2024-09-02 | End: 2024-09-02

## 2024-09-02 RX ORDER — METRONIDAZOLE 250 MG
1 TABLET ORAL
Qty: 69 | Refills: 0
Start: 2024-09-02 | End: 2024-09-24

## 2024-09-02 RX ORDER — LEVOTHYROXINE SODIUM 100 MCG
1 TABLET ORAL
Refills: 0 | DISCHARGE

## 2024-09-02 RX ADMIN — Medication 5000 UNIT(S): at 06:27

## 2024-09-02 RX ADMIN — Medication 20 MILLIGRAM(S): at 06:25

## 2024-09-02 RX ADMIN — Medication 100 MILLIGRAM(S): at 00:06

## 2024-09-02 RX ADMIN — Medication 100 MILLIGRAM(S): at 06:28

## 2024-09-02 NOTE — PROGRESS NOTE ADULT - SUBJECTIVE AND OBJECTIVE BOX
64yPatient is a 64y old  Male who presents with a chief complaint of abd  pain (02 Sep 2024 10:11)      Interval history:  Afebrile, feeling well.       Allergies:   No Known Allergies    Antimicrobials:  cefpodoxime 200 milliGRAM(s) Oral every 12 hours  metroNIDAZOLE    Tablet 500 milliGRAM(s) Oral every 8 hours      REVIEW OF SYSTEMS:  No chest pain   No SOB  No N/V  No rash.       Vital Signs Last 24 Hrs  T(C): 36.6 (09-02-24 @ 12:25), Max: 36.6 (09-02-24 @ 12:25)  T(F): 97.9 (09-02-24 @ 12:25), Max: 97.9 (09-02-24 @ 12:25)  HR: 87 (09-02-24 @ 12:25) (87 - 89)  BP: 124/87 (09-02-24 @ 12:25) (120/80 - 129/85)  BP(mean): --  RR: 18 (09-02-24 @ 12:25) (18 - 18)  SpO2: 95% (09-02-24 @ 12:25) (94% - 96%)      PHYSICAL EXAM:  Pt in no acute distress, alert, awake.   breathing comfortably   3 drains in place, soft abdomen.   no edema LE   no phlebitis         09-01    139  |  100  |  8   ----------------------------<  120<H>  3.9   |  27  |  <0.30<L>    Ca    9.0      01 Sep 2024 07:21        Culture - Aspirate with Gram Stain (collected 30 Aug 2024 16:50)  Source: Aspirate Aspirate  Gram Stain (31 Aug 2024 02:28):    Numerous polymorphonuclear leukocytes seen per low power field    No organisms seen per oil power field  Preliminary Report (31 Aug 2024 18:54):    No growth to date.

## 2024-09-02 NOTE — PROGRESS NOTE ADULT - SUBJECTIVE AND OBJECTIVE BOX
date of service: 09-02-24 @ 06:19  [facility]  REVIEW OF SYSTEMS:  CONSTITUTIONAL: No fever,  no  weight loss  ENT:  No  tinnitus,   no   vertigo  NECK: No pain or stiffness  RESPIRATORY: No cough, wheezing, chills or hemoptysis;    No Shortness of Breath  CARDIOVASCULAR: No chest pain, palpitations, dizziness  GASTROINTESTINAL: No abdominal or epigastric pain. No nausea, vomiting, or hematemesis; No diarrhea  No melena or hematochezia.  GENITOURINARY: No dysuria, frequency, hematuria, or incontinence  NEUROLOGICAL: No headaches  SKIN: No itching,  no   rash  LYMPH Nodes: No enlarged glands  ENDOCRINE: No heat or cold intolerance  MUSCULOSKELETAL: No joint pain or swelling  PSYCHIATRIC: No depression, anxiety  HEME/LYMPH: No easy bruising, or bleeding gums  ALLERGY AND IMMUNOLOGIC: No hives or eczema	    MEDICATIONS  (STANDING):  atorvastatin 10 milliGRAM(s) Oral at bedtime  cefpodoxime 200 milliGRAM(s) Oral every 12 hours  dextrose 5%. 1000 milliLiter(s) (50 mL/Hr) IV Continuous <Continuous>  dextrose 5%. 1000 milliLiter(s) (100 mL/Hr) IV Continuous <Continuous>  dextrose 50% Injectable 25 Gram(s) IV Push once  dextrose 50% Injectable 25 Gram(s) IV Push once  dextrose 50% Injectable 12.5 Gram(s) IV Push once  furosemide    Tablet 20 milliGRAM(s) Oral daily  glucagon  Injectable 1 milliGRAM(s) IntraMuscular once  heparin   Injectable 5000 Unit(s) SubCutaneous every 8 hours  insulin lispro (ADMELOG) corrective regimen sliding scale   SubCutaneous three times a day before meals  metroNIDAZOLE    Tablet 500 milliGRAM(s) Oral every 8 hours  polyethylene glycol 3350 17 Gram(s) Oral daily  senna 2 Tablet(s) Oral at bedtime    MEDICATIONS  (PRN):  dextrose Oral Gel 15 Gram(s) Oral once PRN Blood Glucose LESS THAN 70 milliGRAM(s)/deciliter      Vital Signs Last 24 Hrs  T(C): 36.3 (02 Sep 2024 04:35), Max: 36.6 (01 Sep 2024 12:25)  T(F): 97.4 (02 Sep 2024 04:35), Max: 97.8 (01 Sep 2024 12:25)  HR: 89 (02 Sep 2024 04:35) (88 - 89)  BP: 120/80 (02 Sep 2024 04:35) (120/80 - 129/85)  BP(mean): --  RR: 18 (02 Sep 2024 04:35) (18 - 18)  SpO2: 94% (02 Sep 2024 04:35) (94% - 96%)    Parameters below as of 02 Sep 2024 04:35  Patient On (Oxygen Delivery Method): room air      CAPILLARY BLOOD GLUCOSE      POCT Blood Glucose.: 214 mg/dL (01 Sep 2024 22:53)  POCT Blood Glucose.: 154 mg/dL (01 Sep 2024 17:54)  POCT Blood Glucose.: 146 mg/dL (01 Sep 2024 12:09)  POCT Blood Glucose.: 132 mg/dL (01 Sep 2024 08:26)    I&O's Summary    31 Aug 2024 07:01  -  01 Sep 2024 07:00  --------------------------------------------------------  IN: 0 mL / OUT: 197 mL / NET: -197 mL    01 Sep 2024 07:01  -  02 Sep 2024 06:19  --------------------------------------------------------  IN: 480 mL / OUT: 150 mL / NET: 330 mL          Appearance: Normal	  HEENT:   Normal oral mucosa, PERRL, EOMI	  Lymphatic: No lymphadenopathy  Cardiovascular: Normal S1 S2, No JVD  Respiratory: Lungs clear to auscultation	  Gastrointestinal:  Soft, Non-tender, + BS	  Skin: No rash, No ecchymoses	  Extremities:     LABS:                        11.3   10.54 )-----------( 401      ( 31 Aug 2024 07:05 )             36.1     09-01    139  |  100  |  8   ----------------------------<  120<H>  3.9   |  27  |  <0.30<L>    Ca    9.0      01 Sep 2024 07:21    TPro  6.9  /  Alb  2.6<L>  /  TBili  0.4  /  DBili  x   /  AST  15  /  ALT  10  /  AlkPhos  111  08-31          Urinalysis Basic - ( 01 Sep 2024 07:21 )    Color: x / Appearance: x / SG: x / pH: x  Gluc: 120 mg/dL / Ketone: x  / Bili: x / Urobili: x   Blood: x / Protein: x / Nitrite: x   Leuk Esterase: x / RBC: x / WBC x   Sq Epi: x / Non Sq Epi: x / Bacteria: x                      Consultant(s) Notes Reviewed:      Care Discussed with Consultants/Other Providers:

## 2024-09-02 NOTE — PROGRESS NOTE ADULT - ASSESSMENT
64-year-old male     h/o  HTN.  HLD,  hypothyroidism,  ex  smoker . quit  a yr  ago           abdominal distention, pain, night sweats,   weight loss of 25 pounds.   for  2  months        at  [place]  er 1 month ago, CT scan/ US, told he had benign liver cysts and d/c . MRI  outpt,  then told togo  to  er   /   denies chest pain, fever, cough, recent travel.       abd  pain,  from   infceted   liver cysts/  abscess .  had  no  sepsis       MRI    8/21/24. ,   poststomach,  heterogenous cyst  9.2 x 6.4cm; upper pelvis heterogenous cyst, j right of midline 6.4 x 4.0 cm             right and trace left pleural effusion. Few cystic/fluid filled structures within the liver measuring 4.4cm.  the lateral side,   21 x 14 cm cystic collection. abutting/ adjacent to the stomach,              cystic fluid structure measuring 9x6 cm ,     upper pelvis  cystic fluid structure heterogenous with debris                  pedal  edema/  sob   on exertion,  , for past  month or  so              ?  acute  diastolic    chf.  .  was   on   iv lasix.  echo, normal  ef       HTN/  HLD       Hypothyroid       DM,  follow  fs       Anemia       obesity  bmi  39/  albumin in  2  range/.  c/w  moderate  protein  calorie  malnutrition. suspect  from on  going  c/c  hepatic infevtion        ekg.    nsr, low   voltage / Echo, normal  ef        Ct  chest  angio,  no pe,  hepatic  cysts.  upto  2 2 cm     CT a/p.  mlple  hepatic cysts ,, largest  about   20  by  15  cm/           R/ L hepatic   cyst drainage,  by IR on  8/27,  . with     purulent  drainage ,  froylan  R  drain,   has   b/l  drains              per  surg  attending,  no role  for  surg intervention              abscess  cx.  Bacteroides   vukgatus,             rpt  ct  ,  decompressed  hepatic  cysts ,    now  with   fidel gastric  cyst  drainage   on  8/ 30  by  IR       was   on  tocephin/  iv flagyl,    aspirate  from  8/20,  no   growth'    d/c  home  on  po  flagy;/  cefpodoxime/  f/p  pmr/  id/  surg/  IR                      < from: CT Abdomen and Pelvis w/ IV Cont (08.29.24 @ 15:53) >  ERITONEUM/RETROPERITONEUM: Decreases in size of right interloop   collection measuring 5.4 x2.7 cm, previously 5.7 x 3.0 cm on 8/24/2024.      rad< from: CT Angio Chest PE Protocol w/ IV Cont (08.23.24 @ 16:11) >  MPRESSION:  No pulmonary embolism.  Right hemidiaphragm elevation with mild atelectasis involving the right   middle lobe and right base.  Trace right pleural effusion.  Partially imaged cystic hepatic lesions with largest measuring up to 22   cm. The exact etiology is unclear and diagnostic considerations include   infection and potentially metastasis. Correlate with outside abdominal   MRI from 2 days ago and consider CT abdomen and pelvis for further   assessment  --- End of Report ---      <                   64-year-old male     h/o  HTN.  HLD,  hypothyroidism,  ex  smoker . quit  a yr  ago           abdominal distention, pain, night sweats,   weight loss of 25 pounds.   for  2  months        at  [place]  er 1 month ago, CT scan/ US, told he had benign liver cysts and d/c . MRI  outpt,  then told togo  to  er   /   denies chest pain, fever, cough, recent travel.       abd  pain,  from   infceted   liver cysts/  abscess .  had  no  sepsis       MRI    8/21/24. ,   poststomach,  heterogenous cyst  9.2 x 6.4cm; upper pelvis heterogenous cyst, j right of midline 6.4 x 4.0 cm             right and trace left pleural effusion. Few cystic/fluid filled structures within the liver measuring 4.4cm.  the lateral side,   21 x 14 cm cystic collection. abutting/ adjacent to the stomach,              cystic fluid structure measuring 9x6 cm ,     upper pelvis  cystic fluid structure heterogenous with debris                  pedal  edema/  sob   on exertion,  , for past  month or  so              ?  acute  diastolic    chf.  .  was   on   iv lasix.  echo, normal  ef       HTN/  HLD       Hypothyroid       DM,  follow  fs       Anemia       obesity  bmi  39/  albumin in  2  range/.  c/w  moderate  protein  calorie  malnutrition. suspect  from on  going  c/c  hepatic infevtion        ekg.    nsr, low   voltage / Echo, normal  ef        Ct  chest  angio,  no pe,  hepatic  cysts.  upto  2 2 cm     CT a/p.  mlple  hepatic cysts ,, largest  about   20  by  15  cm/           R/ L hepatic   cyst drainage,  by IR on  8/27,  . with     purulent  drainage ,  froylan  R  drain,   has   b/l  drains              per  surg  attending,  no role  for  surg intervention              abscess  cx.  Bacteroides   vukgatus,             rpt  ct  ,  decompressed  hepatic  cysts ,    now  with   fidel gastric  cyst  drainage   on  8/ 30  by  IR       was   on  tocephin/  iv flagyl,    aspirate  from  8/20,  no   growth'    d/c  home  on  po  flagyl /   cefpodoxime/  f/p  pmr/  id/  surg/  IR              < from: CT Abdomen and Pelvis w/ IV Cont (08.29.24 @ 15:53) >  ERITONEUM/RETROPERITONEUM: Decreases in size of right interloop   collection measuring 5.4 x2.7 cm, previously 5.7 x 3.0 cm on 8/24/2024.      rad< from: CT Angio Chest PE Protocol w/ IV Cont (08.23.24 @ 16:11) >  MPRESSION:  No pulmonary embolism.  Right hemidiaphragm elevation with mild atelectasis involving the right   middle lobe and right base.  Trace right pleural effusion.  Partially imaged cystic hepatic lesions with largest measuring up to 22   cm. The exact etiology is unclear and diagnostic considerations include   infection and potentially metastasis. Correlate with outside abdominal   MRI from 2 days ago and consider CT abdomen and pelvis for further   assessment  --- End of Report ---      <

## 2024-09-02 NOTE — DISCHARGE NOTE NURSING/CASE MANAGEMENT/SOCIAL WORK - PATIENT PORTAL LINK FT
You can access the FollowMyHealth Patient Portal offered by [facility] by registering at the following website: [email or web address]. By joining [facility]’s FollowMyHealth portal, you will also be able to view your health information using other applications (apps) compatible with our system.

## 2024-09-02 NOTE — PROGRESS NOTE ADULT - PROVIDER SPECIALTY LIST ADULT
Cardiology
Internal Medicine
Cardiology
Infectious Disease
Internal Medicine
Internal Medicine
Surgery
Cardiology
Infectious Disease
Internal Medicine
Surgery
Hepatology
Infectious Disease
Internal Medicine
Internal Medicine
Surgery
Surgery

## 2024-09-02 NOTE — DISCHARGE NOTE NURSING/CASE MANAGEMENT/SOCIAL WORK - NSDCPEFALRISK_GEN_ALL_CORE
For information on Fall & Injury Prevention, visit: [email or web address] OR  [email or web address] OR  https://www.cdc.gov/steadi/patient.html

## 2024-09-02 NOTE — PROGRESS NOTE ADULT - ASSESSMENT
64-year-old male with PMHx of hypertension, hypercholesterolemia, hypothyroidism presenting with pain abdomen,  weakness, weight loss since July. He started feeling unwell since July 4th.  He had significant weight loss of 25 pounds. Former smoker, quit 1 year ago. He was  evaluated at [place] ED 1 month ago, had CT scan and US, told he had benign liver cysts and discharged.  Due  to  persistent  abd  pain,  his  pcp,   did  an  MRI and pt  was   called last night and told to go to  er  for follow-up testing.     Has been afebrile, NO leukocytosis, preserved renal/liver functions, CRP high 197  Has outpt MRI disc and reports with him - large cystic lesions noted.     CT repeated and reveals large cysts.    # liver abscess, elevated CRP, positive culture finding.  clinically improving.       - c/w ceftriaxone and flagyl.   - s/p IR drainage of cyst - cx with bacteroides   - sx on board. repeat CT with improved collection. based on CT s/p placement of 3rd drain. cx NTD   - blood culture negative  - entamoeba serology negative   - quant gold was negative  - can transition to po cefpodoxime 200 mg q12h and flagyl 500 mg q8h when ready for discharge for 4 weeks until 9/26/24.   pt to follow with me in 3 weeks.   side effects of abx discussed in detail.           Omayra Douglas  Please contact through MS Teams   If no response or past 5 pm/weekend call [phone].

## 2024-09-02 NOTE — PROGRESS NOTE ADULT - REASON FOR ADMISSION
abd  pain

## 2024-09-02 NOTE — PROGRESS NOTE ADULT - SUBJECTIVE AND OBJECTIVE BOX
Date of Service: 09-02-24 @ 10:12           CARDIOLOGY     PROGRESS  NOTE   ________________________________________________    CHIEF COMPLAINT:Patient is a 64y old  Male who presents with a chief complaint of abd  pain (02 Sep 2024 06:18)  no complain  	  REVIEW OF SYSTEMS:  CONSTITUTIONAL: No fever, weight loss, or fatigue  EYES: No eye pain, visual disturbances, or discharge  ENT:  No difficulty hearing, tinnitus, vertigo; No sinus or throat pain  NECK: No pain or stiffness  RESPIRATORY: No cough, wheezing, chills or hemoptysis; No Shortness of Breath  CARDIOVASCULAR: No chest pain, palpitations, passing out, dizziness, or leg swelling  GASTROINTESTINAL: No abdominal or epigastric pain. No nausea, vomiting, or hematemesis; No diarrhea or constipation. No melena or hematochezia.  GENITOURINARY: No dysuria, frequency, hematuria, or incontinence  NEUROLOGICAL: No headaches, memory loss, loss of strength, numbness, or tremors  SKIN: No itching, burning, rashes, or lesions   LYMPH Nodes: No enlarged glands  ENDOCRINE: No heat or cold intolerance; No hair loss  MUSCULOSKELETAL: No joint pain or swelling; No muscle, back, or extremity pain  PSYCHIATRIC: No depression, anxiety, mood swings, or difficulty sleeping  HEME/LYMPH: No easy bruising, or bleeding gums  ALLERGY AND IMMUNOLOGIC: No hives or eczema	    [x ] All others negative	  [ ] Unable to obtain    PHYSICAL EXAM:  T(C): 36.3 (09-02-24 @ 04:35), Max: 36.6 (09-01-24 @ 12:25)  HR: 89 (09-02-24 @ 04:35) (88 - 89)  BP: 120/80 (09-02-24 @ 04:35) (120/80 - 129/85)  RR: 18 (09-02-24 @ 04:35) (18 - 18)  SpO2: 94% (09-02-24 @ 04:35) (94% - 96%)  Wt(kg): --  I&O's Summary    01 Sep 2024 07:01  -  02 Sep 2024 07:00  --------------------------------------------------------  IN: 480 mL / OUT: 300 mL / NET: 180 mL        Appearance: Normal	  HEENT:   Normal oral mucosa, PERRL, EOMI	  Lymphatic: No lymphadenopathy  Cardiovascular: Normal S1 S2, No JVD, + murmurs, No edema  Respiratory:rhonchi  Psychiatry: A & O x 3, Mood & affect appropriate  Gastrointestinal:  Soft, Non-tender, + BS	  Skin: No rashes, No ecchymoses, No cyanosis	  Neurologic: Non-focal  Extremities: Normal range of motion, No clubbing, cyanosis or edema  Vascular: Peripheral pulses palpable 2+ bilaterally    MEDICATIONS  (STANDING):  atorvastatin 20 milliGRAM(s) Oral at bedtime  cefpodoxime 200 milliGRAM(s) Oral every 12 hours  dextrose 5%. 1000 milliLiter(s) (50 mL/Hr) IV Continuous <Continuous>  dextrose 5%. 1000 milliLiter(s) (100 mL/Hr) IV Continuous <Continuous>  dextrose 50% Injectable 25 Gram(s) IV Push once  dextrose 50% Injectable 25 Gram(s) IV Push once  dextrose 50% Injectable 12.5 Gram(s) IV Push once  furosemide    Tablet 20 milliGRAM(s) Oral daily  glucagon  Injectable 1 milliGRAM(s) IntraMuscular once  heparin   Injectable 5000 Unit(s) SubCutaneous every 8 hours  insulin lispro (ADMELOG) corrective regimen sliding scale   SubCutaneous three times a day before meals  levothyroxine 50 MICROGram(s) Oral daily  metroNIDAZOLE    Tablet 500 milliGRAM(s) Oral every 8 hours  polyethylene glycol 3350 17 Gram(s) Oral daily  senna 2 Tablet(s) Oral at bedtime      TELEMETRY: 	    ECG:  	  RADIOLOGY:  OTHER: 	  	  LABS:	 	    CARDIAC MARKERS:        09-01    139  |  100  |  8   ----------------------------<  120<H>  3.9   |  27  |  <0.30<L>    Ca    9.0      01 Sep 2024 07:21      proBNP:   Lipid Profile:   HgA1c:   TSH:     Culture - Abscess with Gram Stain (08.27.24 @ 17:20)    Gram Stain:   Moderate polymorphonuclear leukocytes seen per low power field  No organisms seen per oil power field   Specimen Source: .Abscess Aspirate   Culture Results:   Few Bacteroides vulgatus group "Susceptibilities not performed"          Assessment and plan  ---------------------------  64-year-old male with PMHx of hypertension, hypercholesterolemia, hypothyroidism presenting with 2 months of abdominal distention and pain, night sweats, and weight loss of 25 pounds.  Endorses SOB with trouble taking deep breaths, improving. Patient is a former smoker, quit 1 year ago. + constipation, + dysuria intermittent x 1 month.  Last bowel movement 2 days ago, wnl. + flatulance.  Patient evaluated at [place] ED 1 month ago, had CT scan and US, told he had benign liver cysts and discharged. When abd discomfort  did not improve pt followed up with his PCP who sent him for an MRI 2 days ago.  Patient called last night and told to go to ED for follow-up testing. Results were not discussed with him. Denies chest pain, fever, cough, recent travel.  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 Denies chronic alcohol use. Pt did at home H pylori test last week, came back positive.  pt with hx of htn, dm with hepatic mass with  increasing sob and LE  edema  no cardiac dawn done before  awaiting repeat ecg, noted NSR  tsh  lipid panel  decrease lasix  dvt prophylaxis  ct scan noted, hepatic cyst  decrease lasix to 20 mg daily, echo noted with RV enlarged mild , needs sleep study as out pt, no DVT or PE  fu as out pt for ischemia dawn/ sleep study as out pt  s/p drainage of the liver cyst, started on zosyn  follow up cultures/ ID noted  increase ambulation  continue with lasix 20 mg daily  continue iv abx  replete K  ct chest in 1 month   culture with Bacteroides recommendation

## 2024-09-04 DIAGNOSIS — R18.8 OTHER ASCITES: ICD-10-CM

## 2024-09-04 LAB
CULTURE RESULTS: SIGNIFICANT CHANGE UP
SPECIMEN SOURCE: SIGNIFICANT CHANGE UP

## 2024-09-11 ENCOUNTER — NON-APPOINTMENT (OUTPATIENT)
Age: 64
End: 2024-09-11

## 2024-09-11 ENCOUNTER — APPOINTMENT (OUTPATIENT)
Dept: CT IMAGING | Facility: HOSPITAL | Age: 64
End: 2024-09-11

## 2024-09-11 ENCOUNTER — TRANSCRIPTION ENCOUNTER (OUTPATIENT)
Age: 64
End: 2024-09-11

## 2024-09-11 ENCOUNTER — RESULT REVIEW (OUTPATIENT)
Age: 64
End: 2024-09-11

## 2024-09-11 ENCOUNTER — OUTPATIENT (OUTPATIENT)
Dept: OUTPATIENT SERVICES | Facility: HOSPITAL | Age: 64
LOS: 1 days | End: 2024-09-11
Payer: COMMERCIAL

## 2024-09-11 VITALS
WEIGHT: 214.95 LBS | HEIGHT: 67 IN | HEART RATE: 93 BPM | SYSTOLIC BLOOD PRESSURE: 108 MMHG | TEMPERATURE: 97 F | RESPIRATION RATE: 18 BRPM | DIASTOLIC BLOOD PRESSURE: 78 MMHG

## 2024-09-11 DIAGNOSIS — R18.8 OTHER ASCITES: ICD-10-CM

## 2024-09-11 DIAGNOSIS — K76.89 OTHER SPECIFIED DISEASES OF LIVER: ICD-10-CM

## 2024-09-11 DIAGNOSIS — Z98.890 OTHER SPECIFIED POSTPROCEDURAL STATES: Chronic | ICD-10-CM

## 2024-09-11 DIAGNOSIS — Z46.82 ENCOUNTER FOR FITTING AND ADJUSTMENT OF NON-VASCULAR CATHETER: ICD-10-CM

## 2024-09-11 PROCEDURE — 76080 X-RAY EXAM OF FISTULA: CPT | Mod: 59

## 2024-09-11 PROCEDURE — 75984 XRAY CONTROL CATHETER CHANGE: CPT

## 2024-09-11 PROCEDURE — C1729: CPT

## 2024-09-11 PROCEDURE — 49424 ASSESS CYST CONTRAST INJECT: CPT

## 2024-09-11 PROCEDURE — 49423 EXCHANGE DRAINAGE CATHETER: CPT

## 2024-09-11 PROCEDURE — 49424 ASSESS CYST CONTRAST INJECT: CPT | Mod: 59

## 2024-09-11 PROCEDURE — 74176 CT ABD & PELVIS W/O CONTRAST: CPT | Mod: 26

## 2024-09-11 PROCEDURE — 75984 XRAY CONTROL CATHETER CHANGE: CPT | Mod: 26

## 2024-09-11 PROCEDURE — 76080 X-RAY EXAM OF FISTULA: CPT | Mod: 26,59

## 2024-09-11 PROCEDURE — C1887: CPT

## 2024-09-11 PROCEDURE — 74176 CT ABD & PELVIS W/O CONTRAST: CPT

## 2024-09-11 PROCEDURE — C1769: CPT

## 2024-09-11 RX ORDER — SIMVASTATIN 10 MG
1 TABLET ORAL
Refills: 0 | DISCHARGE

## 2024-09-11 NOTE — ASU DISCHARGE PLAN (ADULT/PEDIATRIC) - ASU DC SPECIAL INSTRUCTIONSFT
Drain Check    Discharge Instructions  - You have had drains repositioned.  - Keep the area clean and dry.  - Do not soak in a tub or pool with the drain, however you may shower with the drain and dressing covered in plastic wrap.  - Do not put traction on the drain and be careful that the drain does not get accidentally dislodged or kinked.  - Record output daily from the drain. Empty the bag as needed.  - You may resume your normal diet.  - You may resume your normal medications.  - It is normal to experience some pain over the site for the next few days. You may take apply ice to the area (20 minutes on, 20 minutes off) and take Tylenol for that pain. Do not take more frequently than every 6 hours and do not exceed more than 3000mg of Tylenol in a 24 hour period.    Notify your primary physician and/or Interventional Radiology IMMEDIATELY if you experience any of the following       - Fever of 100.4F  or 38C       - Chills or Rigors/ Shakes       - Swelling and/or Redness in the area of the puncture site       - Worsening Pain       - Blood soaked bandages or worsening bleeding       - Lightheadedness and/or dizziness upon standing       - Chest Pain/ Tightness       - Shortness of Breath       - Difficulty walking    If you have a problem that you believe requires IMMEDIATE attention, please go to your NEAREST Emergency Room. If you believe your problem can safely wait until you speak to a physician, please call Interventional Radiology for any concerns.    Please feel free to contact us at [phone] if any problems arise. After 6PM, Monday through Friday, on weekends and on holidays, please call [phone] and ask for the radiology resident on call to be paged. Drain Check and removal    -You had 1 drain removed, keep site clean, dry and covered till it is completely healed.    Discharge Instructions  - You have had drains repositioned.  - Keep the area clean and dry.  - Do not soak in a tub or pool with the drain, however you may shower with the drain and dressing covered in plastic wrap.  - Do not put traction on the drain and be careful that the drain does not get accidentally dislodged or kinked.  - Record output daily from the drain. Empty the bag as needed.  - You may resume your normal diet.  - You may resume your normal medications.  - It is normal to experience some pain over the site for the next few days. You may take apply ice to the area (20 minutes on, 20 minutes off) and take Tylenol for that pain. Do not take more frequently than every 6 hours and do not exceed more than 3000mg of Tylenol in a 24 hour period.    Notify your primary physician and/or Interventional Radiology IMMEDIATELY if you experience any of the following       - Fever of 100.4F  or 38C       - Chills or Rigors/ Shakes       - Swelling and/or Redness in the area of the puncture site       - Worsening Pain       - Blood soaked bandages or worsening bleeding       - Lightheadedness and/or dizziness upon standing       - Chest Pain/ Tightness       - Shortness of Breath       - Difficulty walking    If you have a problem that you believe requires IMMEDIATE attention, please go to your NEAREST Emergency Room. If you believe your problem can safely wait until you speak to a physician, please call Interventional Radiology for any concerns.    Please feel free to contact us at [phone] if any problems arise. After 6PM, Monday through Friday, on weekends and on holidays, please call [phone] and ask for the radiology resident on call to be paged.

## 2024-09-11 NOTE — PROCEDURE NOTE - PROCEDURE FINDINGS AND DETAILS
Anterior hepatic drain injected with contrast, collapsed cavity without communication to any nearby structures. Removed over wire.  Right hepatic drain injected with contrast demonstrating moderately sized amorphic cavity. Exchanged and repositioned deeper into collection; attached to gravity bag.  Perigastric drain injected with contrast demonstrating moderately sized amorphic cavity however drain in appropriate position, left in place and reattached to BHARATI bulb.

## 2024-09-11 NOTE — ASU DISCHARGE PLAN (ADULT/PEDIATRIC) - NS MD DC FALL RISK RISK
For information on Fall & Injury Prevention, visit: [email or web address] OR  [email or web address] OR  https://www.cdc.gov/steadi/patient.html

## 2024-09-11 NOTE — ASU DISCHARGE PLAN (ADULT/PEDIATRIC) - NURSING INSTRUCTIONS
Please feel free to contact us at [phone] if any problems arise. After 6PM, Monday through Friday, on weekends and on holidays, please call [phone] and ask for the radiology resident on call to be paged.

## 2024-09-11 NOTE — PRE PROCEDURE NOTE - PRE PROCEDURE EVALUATION
Interventional Radiology    HPI: 64M with multiple abdominal drains (2 from hepatic cysts and 1 perigastric collection) with last drainage on 8/30/24. He now presents today for drain evalautions.    Pt reports...    Allergies: No Known Allergies    Medications (Abx/Cardiac/Anticoagulation/Blood Products)  amlodipine-benazepril 10 mg-20 mg oral capsule: 1 cap(s) orally once a day (11 Sep 2024 12:13)  aspirin 81 mg oral tablet: 1 tab(s) orally once a day (11 Sep 2024 12:13)  levothyroxine 50 mcg (0.05 mg) oral tablet: 1 tab(s) orally once a day (11 Sep 2024 12:13)  Lotrel 10 mg-20 mg oral capsule: 1 orally once a day (11 Sep 2024 12:13)  metFORMIN 500 mg oral tablet: 1 tab(s) orally 2 times a day (11 Sep 2024 12:13)  polyethylene glycol 3350 oral powder for reconstitution: 17 gram(s) orally once a day (at bedtime) (11 Sep 2024 12:13)  simvastatin 40 mg oral tablet: 1 tab(s) orally once a day (11 Sep 2024 12:13)  simvastatin 40 mg oral tablet: 1 orally once a day (11 Sep 2024 12:13)    Data:    T(C): --  HR: --  BP: --  RR: --  SpO2: --    Exam  General: No acute distress  Chest: Non labored breathing  Abdomen: 3 abdominal drains in place with clean, dry  and sterile dressing intact.    Imaging: Reviewed.    Plan: 64y Male presents for 3 abdominal drain evaluations.    -Risks/Benefits/alternatives explained with the patient and/or healthcare proxy and witnessed informed consent obtained.    Interventional Radiology    HPI: 64M with multiple abdominal drains (2 from hepatic cysts and 1 perigastric collection) with last drainage on 8/30/24. He now presents today for drain evaluations    Pt reports minimal output from all the 3 drains with appropriate flushing every day. Denies fevers, chills, nausea, vomiting or abdominal pain.    Allergies: No Known Allergies    Medications (Abx/Cardiac/Anticoagulation/Blood Products)  amlodipine-benazepril 10 mg-20 mg oral capsule: 1 cap(s) orally once a day (11 Sep 2024 12:13)  aspirin 81 mg oral tablet: 1 tab(s) orally once a day (11 Sep 2024 12:13)  levothyroxine 50 mcg (0.05 mg) oral tablet: 1 tab(s) orally once a day (11 Sep 2024 12:13)  Lotrel 10 mg-20 mg oral capsule: 1 orally once a day (11 Sep 2024 12:13)  metFORMIN 500 mg oral tablet: 1 tab(s) orally 2 times a day (11 Sep 2024 12:13)  polyethylene glycol 3350 oral powder for reconstitution: 17 gram(s) orally once a day (at bedtime) (11 Sep 2024 12:13)  simvastatin 40 mg oral tablet: 1 tab(s) orally once a day (11 Sep 2024 12:13)  simvastatin 40 mg oral tablet: 1 orally once a day (11 Sep 2024 12:13)    Data:    T(C): --  HR: --  BP: --  RR: --  SpO2: --    Exam  General: No acute distress  Chest: Non labored breathing  Abdomen: 3 abdominal drains in place with clean, dry  and sterile dressing intact.    Imaging: Reviewed.    Plan: 64y Male presents for 3 abdominal drain evaluations.    -Risks/Benefits/alternatives explained with the patient and/or healthcare proxy and witnessed informed consent obtained.

## 2024-09-16 PROBLEM — K76.89 LIVER CYST: Status: ACTIVE | Noted: 2024-09-16

## 2024-09-16 NOTE — ASSESSMENT
[FreeTextEntry1] :   63 y/o male admitted to ED for R/ L hepatic cyst drainage by IR on 8/27with purulent drainage.  CT AP-   Plan:

## 2024-09-16 NOTE — PHYSICAL EXAM
[FreeTextEntry1] :   COVID -19 precautions as per [facility] policy was universally followed.

## 2024-09-16 NOTE — HISTORY OF PRESENT ILLNESS
[de-identified] :   63 y/o male admitted to ED for R/ L hepatic cyst drainage by IR on 8/27with purulent drainage.  cx.  Bacteroides vukgatus,      Repeat CT- decompressed hepatic cysts. D/c home on po  flagyl/ cefpodoxime till 9/26.  CT 9/  Pmhx: HTN, HLD SxHx: Social: quit smoking one month ago.  PCP:  Cardiology:

## 2024-09-17 ENCOUNTER — APPOINTMENT (OUTPATIENT)
Dept: SURGICAL ONCOLOGY | Facility: CLINIC | Age: 64
End: 2024-09-17

## 2024-09-17 DIAGNOSIS — K76.89 OTHER SPECIFIED DISEASES OF LIVER: ICD-10-CM

## 2024-09-19 ENCOUNTER — RESULT REVIEW (OUTPATIENT)
Age: 64
End: 2024-09-19

## 2024-09-19 ENCOUNTER — TRANSCRIPTION ENCOUNTER (OUTPATIENT)
Age: 64
End: 2024-09-19

## 2024-09-19 RX ORDER — AMLODIPINE BENAZEPRIL HYDROCHLORIDE 5; 20 MG/1; MG/1
1 CAPSULE ORAL
Refills: 0 | DISCHARGE

## 2024-09-19 RX ORDER — METFORMIN HYDROCHLORIDE 850 MG/1
1 TABLET, FILM COATED ORAL
Refills: 0 | DISCHARGE

## 2024-09-19 RX ORDER — ASPIRIN 81 MG
1 TABLET, DELAYED RELEASE (ENTERIC COATED) ORAL
Refills: 0 | DISCHARGE

## 2024-09-25 LAB
CULTURE RESULTS: SIGNIFICANT CHANGE UP
CULTURE RESULTS: SIGNIFICANT CHANGE UP
SPECIMEN SOURCE: SIGNIFICANT CHANGE UP
SPECIMEN SOURCE: SIGNIFICANT CHANGE UP

## 2024-09-26 ENCOUNTER — APPOINTMENT (OUTPATIENT)
Dept: INFECTIOUS DISEASE | Facility: CLINIC | Age: 64
End: 2024-09-26
Payer: COMMERCIAL

## 2024-09-26 VITALS
OXYGEN SATURATION: 96 % | BODY MASS INDEX: 32.96 KG/M2 | HEIGHT: 67 IN | WEIGHT: 210 LBS | TEMPERATURE: 97.9 F | SYSTOLIC BLOOD PRESSURE: 100 MMHG | DIASTOLIC BLOOD PRESSURE: 70 MMHG | HEART RATE: 90 BPM

## 2024-09-26 DIAGNOSIS — K75.0 ABSCESS OF LIVER: ICD-10-CM

## 2024-09-26 PROCEDURE — 99214 OFFICE O/P EST MOD 30 MIN: CPT

## 2024-09-26 RX ORDER — CIPROFLOXACIN HYDROCHLORIDE 500 MG/1
500 TABLET, FILM COATED ORAL
Qty: 28 | Refills: 0 | Status: ACTIVE | COMMUNITY
Start: 2024-09-26 | End: 1900-01-01

## 2024-09-26 RX ORDER — METRONIDAZOLE 500 MG/1
500 TABLET ORAL EVERY 8 HOURS
Qty: 42 | Refills: 0 | Status: ACTIVE | COMMUNITY
Start: 2024-09-26 | End: 1900-01-01

## 2024-09-26 NOTE — REVIEW OF SYSTEMS
[Normal Appetite] : normal appetite [Fever] : no fever [Chills] : no chills [Lower Ext Edema] : no extremity edema [Shortness Of Breath] : no shortness of breath [Cough] : no cough [Abdominal Pain] : no abdominal pain [Diarrhea] : no diarrhea [Skin Lesions] : no skin lesions [Anxiety] : no anxiety

## 2024-09-26 NOTE — REASON FOR VISIT
[Post Hospitalization] : a post hospitalization visit [Spouse] : spouse [FreeTextEntry1] : liver abscess

## 2024-09-26 NOTE — HISTORY OF PRESENT ILLNESS
[FreeTextEntry1] : 64-year-old male with PMHx of hypertension, hypercholesterolemia, hypothyroidism presented with pain abdomen, weakness, weight loss since July. Found to have liver abscess. s/p IR drainage of cyst - cx with bacteroides  sx was consulted.  entamoeba serology negative  quant gold was negative discharged on cefpodoxime 200 mg q12h and flagyl 500 mg q8h for 4 weeks until 9/26/24.  pt is here for follow up. Feels well. No abdominal pain, appetite normal. He had 2 drains removed already, one left, seeing IR monday for drain check.

## 2024-09-26 NOTE — PHYSICAL EXAM
[General Appearance - Alert] : alert [General Appearance - In No Acute Distress] : in no acute distress [Auscultation Breath Sounds / Voice Sounds] : lungs were clear to auscultation bilaterally [Heart Sounds] : normal S1 and S2 [Edema] : there was no peripheral edema [Abdomen Soft] : soft [Abdomen Tenderness] : non-tender [] : no rash [Oriented To Time, Place, And Person] : oriented to person, place, and time [FreeTextEntry1] : lt sided drain with purulent fluid.

## 2024-09-27 ENCOUNTER — RESULT REVIEW (OUTPATIENT)
Age: 64
End: 2024-09-27

## 2024-09-27 DIAGNOSIS — R18.8 OTHER ASCITES: ICD-10-CM

## 2024-09-30 ENCOUNTER — OUTPATIENT (OUTPATIENT)
Dept: OUTPATIENT SERVICES | Facility: HOSPITAL | Age: 64
LOS: 1 days | End: 2024-09-30
Payer: COMMERCIAL

## 2024-09-30 ENCOUNTER — APPOINTMENT (OUTPATIENT)
Dept: CT IMAGING | Facility: HOSPITAL | Age: 64
End: 2024-09-30

## 2024-09-30 ENCOUNTER — TRANSCRIPTION ENCOUNTER (OUTPATIENT)
Age: 64
End: 2024-09-30

## 2024-09-30 VITALS
SYSTOLIC BLOOD PRESSURE: 123 MMHG | DIASTOLIC BLOOD PRESSURE: 88 MMHG | HEART RATE: 80 BPM | OXYGEN SATURATION: 99 % | HEIGHT: 67 IN | RESPIRATION RATE: 15 BRPM | WEIGHT: 207.9 LBS | TEMPERATURE: 98 F

## 2024-09-30 DIAGNOSIS — Z98.890 OTHER SPECIFIED POSTPROCEDURAL STATES: Chronic | ICD-10-CM

## 2024-09-30 DIAGNOSIS — R93.5 ABNORMAL FINDINGS ON DIAGNOSTIC IMAGING OF OTHER ABDOMINAL REGIONS, INCLUDING RETROPERITONEUM: ICD-10-CM

## 2024-09-30 PROCEDURE — 49424 ASSESS CYST CONTRAST INJECT: CPT

## 2024-09-30 PROCEDURE — C1769: CPT

## 2024-09-30 PROCEDURE — 76080 X-RAY EXAM OF FISTULA: CPT

## 2024-09-30 PROCEDURE — 76080 X-RAY EXAM OF FISTULA: CPT | Mod: 26

## 2024-09-30 PROCEDURE — 74177 CT ABD & PELVIS W/CONTRAST: CPT

## 2024-09-30 PROCEDURE — 74177 CT ABD & PELVIS W/CONTRAST: CPT | Mod: 26

## 2024-09-30 NOTE — ASU DISCHARGE PLAN (ADULT/PEDIATRIC) - NURSING INSTRUCTIONS
Please feel free to contact us at [phone] if any problems arise. After 6PM, Monday through Friday, on weekends and on holidays, please call [phone] and ask for the radiology resident on call to be paged.

## 2024-10-07 ENCOUNTER — NON-APPOINTMENT (OUTPATIENT)
Age: 64
End: 2024-10-07

## 2024-10-21 ENCOUNTER — APPOINTMENT (OUTPATIENT)
Dept: INFECTIOUS DISEASE | Facility: CLINIC | Age: 64
End: 2024-10-21
Payer: COMMERCIAL

## 2024-10-21 ENCOUNTER — NON-APPOINTMENT (OUTPATIENT)
Age: 64
End: 2024-10-21

## 2024-10-21 VITALS
WEIGHT: 205 LBS | HEART RATE: 85 BPM | TEMPERATURE: 97.8 F | DIASTOLIC BLOOD PRESSURE: 75 MMHG | SYSTOLIC BLOOD PRESSURE: 125 MMHG | OXYGEN SATURATION: 97 % | HEIGHT: 67 IN | BODY MASS INDEX: 32.18 KG/M2

## 2024-10-21 DIAGNOSIS — K75.0 ABSCESS OF LIVER: ICD-10-CM

## 2024-10-21 PROCEDURE — 99214 OFFICE O/P EST MOD 30 MIN: CPT

## 2024-10-22 ENCOUNTER — NON-APPOINTMENT (OUTPATIENT)
Age: 64
End: 2024-10-22

## 2024-11-08 ENCOUNTER — APPOINTMENT (OUTPATIENT)
Dept: CT IMAGING | Facility: CLINIC | Age: 64
End: 2024-11-08

## 2024-11-08 PROCEDURE — 74177 CT ABD & PELVIS W/CONTRAST: CPT

## 2025-06-14 ENCOUNTER — NON-APPOINTMENT (OUTPATIENT)
Age: 65
End: 2025-06-14